# Patient Record
Sex: MALE | Race: WHITE | NOT HISPANIC OR LATINO | Employment: OTHER | URBAN - METROPOLITAN AREA
[De-identification: names, ages, dates, MRNs, and addresses within clinical notes are randomized per-mention and may not be internally consistent; named-entity substitution may affect disease eponyms.]

---

## 2022-08-02 ENCOUNTER — HOSPITAL ENCOUNTER (OUTPATIENT)
Dept: GENERAL RADIOLOGY | Facility: HOSPITAL | Age: 82
Discharge: HOME | End: 2022-08-02

## 2022-08-02 ENCOUNTER — PRE-ADMISSION TESTING (OUTPATIENT)
Dept: PREADMISSION TESTING | Facility: HOSPITAL | Age: 82
End: 2022-08-02

## 2022-08-02 VITALS
DIASTOLIC BLOOD PRESSURE: 65 MMHG | WEIGHT: 170.8 LBS | OXYGEN SATURATION: 94 % | RESPIRATION RATE: 18 BRPM | HEART RATE: 98 BPM | BODY MASS INDEX: 27.45 KG/M2 | HEIGHT: 66 IN | TEMPERATURE: 97.9 F | SYSTOLIC BLOOD PRESSURE: 122 MMHG

## 2022-08-02 LAB
ABO GROUP BLD: NORMAL
ALBUMIN SERPL-MCNC: 4.1 G/DL (ref 3.5–5.2)
ALBUMIN/GLOB SERPL: 1.9 G/DL
ALP SERPL-CCNC: 56 U/L (ref 39–117)
ALT SERPL W P-5'-P-CCNC: 24 U/L (ref 1–41)
ANION GAP SERPL CALCULATED.3IONS-SCNC: 8.6 MMOL/L (ref 5–15)
APTT PPP: 27.8 SECONDS (ref 22.7–35.4)
AST SERPL-CCNC: 22 U/L (ref 1–40)
BACTERIA UR QL AUTO: ABNORMAL /HPF
BASOPHILS # BLD AUTO: 0.01 10*3/MM3 (ref 0–0.2)
BASOPHILS NFR BLD AUTO: 0.2 % (ref 0–1.5)
BILIRUB SERPL-MCNC: 0.9 MG/DL (ref 0–1.2)
BILIRUB UR QL STRIP: NEGATIVE
BLD GP AB SCN SERPL QL: NEGATIVE
BUN SERPL-MCNC: 18 MG/DL (ref 8–23)
BUN/CREAT SERPL: 18.8 (ref 7–25)
CALCIUM SPEC-SCNC: 9.4 MG/DL (ref 8.6–10.5)
CHLORIDE SERPL-SCNC: 106 MMOL/L (ref 98–107)
CLARITY UR: CLEAR
CO2 SERPL-SCNC: 26.4 MMOL/L (ref 22–29)
COLOR UR: YELLOW
CREAT SERPL-MCNC: 0.96 MG/DL (ref 0.76–1.27)
DEPRECATED RDW RBC AUTO: 42.9 FL (ref 37–54)
EGFRCR SERPLBLD CKD-EPI 2021: 79.9 ML/MIN/1.73
EOSINOPHIL # BLD AUTO: 0 10*3/MM3 (ref 0–0.4)
EOSINOPHIL NFR BLD AUTO: 0 % (ref 0.3–6.2)
ERYTHROCYTE [DISTWIDTH] IN BLOOD BY AUTOMATED COUNT: 12.8 % (ref 12.3–15.4)
GLOBULIN UR ELPH-MCNC: 2.2 GM/DL
GLUCOSE SERPL-MCNC: 97 MG/DL (ref 65–99)
GLUCOSE UR STRIP-MCNC: NEGATIVE MG/DL
HCT VFR BLD AUTO: 46.9 % (ref 37.5–51)
HGB BLD-MCNC: 15.6 G/DL (ref 13–17.7)
HGB UR QL STRIP.AUTO: ABNORMAL
HYALINE CASTS UR QL AUTO: ABNORMAL /LPF
INR PPP: 1.06 (ref 0.9–1.1)
KETONES UR QL STRIP: NEGATIVE
LEUKOCYTE ESTERASE UR QL STRIP.AUTO: ABNORMAL
LYMPHOCYTES # BLD AUTO: 0.97 10*3/MM3 (ref 0.7–3.1)
LYMPHOCYTES NFR BLD AUTO: 16.6 % (ref 19.6–45.3)
MCH RBC QN AUTO: 31.1 PG (ref 26.6–33)
MCHC RBC AUTO-ENTMCNC: 33.3 G/DL (ref 31.5–35.7)
MCV RBC AUTO: 93.4 FL (ref 79–97)
MONOCYTES # BLD AUTO: 0.93 10*3/MM3 (ref 0.1–0.9)
MONOCYTES NFR BLD AUTO: 15.9 % (ref 5–12)
NEUTROPHILS NFR BLD AUTO: 3.91 10*3/MM3 (ref 1.7–7)
NEUTROPHILS NFR BLD AUTO: 67 % (ref 42.7–76)
NITRITE UR QL STRIP: NEGATIVE
PH UR STRIP.AUTO: 5.5 [PH] (ref 5–8)
PLATELET # BLD AUTO: 146 10*3/MM3 (ref 140–450)
PMV BLD AUTO: 9.8 FL (ref 6–12)
POTASSIUM SERPL-SCNC: 3.9 MMOL/L (ref 3.5–5.2)
PROT SERPL-MCNC: 6.3 G/DL (ref 6–8.5)
PROT UR QL STRIP: ABNORMAL
PROTHROMBIN TIME: 13.7 SECONDS (ref 11.7–14.2)
QT INTERVAL: 449 MS
RBC # BLD AUTO: 5.02 10*6/MM3 (ref 4.14–5.8)
RBC # UR STRIP: ABNORMAL /HPF
REF LAB TEST METHOD: ABNORMAL
RH BLD: NEGATIVE
SODIUM SERPL-SCNC: 141 MMOL/L (ref 136–145)
SP GR UR STRIP: 1.01 (ref 1–1.03)
SQUAMOUS #/AREA URNS HPF: ABNORMAL /HPF
T&S EXPIRATION DATE: NORMAL
UROBILINOGEN UR QL STRIP: ABNORMAL
WBC # UR STRIP: ABNORMAL /HPF
WBC NRBC COR # BLD: 5.84 10*3/MM3 (ref 3.4–10.8)

## 2022-08-02 PROCEDURE — 71046 X-RAY EXAM CHEST 2 VIEWS: CPT

## 2022-08-02 PROCEDURE — 93010 ELECTROCARDIOGRAM REPORT: CPT | Performed by: INTERNAL MEDICINE

## 2022-08-02 PROCEDURE — 86900 BLOOD TYPING SEROLOGIC ABO: CPT

## 2022-08-02 PROCEDURE — 86901 BLOOD TYPING SEROLOGIC RH(D): CPT

## 2022-08-02 PROCEDURE — 93005 ELECTROCARDIOGRAM TRACING: CPT

## 2022-08-02 PROCEDURE — 85730 THROMBOPLASTIN TIME PARTIAL: CPT

## 2022-08-02 PROCEDURE — 36415 COLL VENOUS BLD VENIPUNCTURE: CPT

## 2022-08-02 PROCEDURE — 86850 RBC ANTIBODY SCREEN: CPT

## 2022-08-02 PROCEDURE — 85025 COMPLETE CBC W/AUTO DIFF WBC: CPT

## 2022-08-02 PROCEDURE — 73030 X-RAY EXAM OF SHOULDER: CPT

## 2022-08-02 PROCEDURE — 81001 URINALYSIS AUTO W/SCOPE: CPT

## 2022-08-02 PROCEDURE — 85610 PROTHROMBIN TIME: CPT

## 2022-08-02 PROCEDURE — 80053 COMPREHEN METABOLIC PANEL: CPT

## 2022-08-02 RX ORDER — ALBUTEROL SULFATE 2.5 MG/3ML
2.5 SOLUTION RESPIRATORY (INHALATION) EVERY 4 HOURS PRN
COMMUNITY

## 2022-08-02 RX ORDER — BENRALIZUMAB 30 MG/ML
INJECTION, SOLUTION SUBCUTANEOUS
COMMUNITY

## 2022-08-02 RX ORDER — LISINOPRIL 2.5 MG/1
2.5 TABLET ORAL DAILY
COMMUNITY

## 2022-08-02 RX ORDER — ROSUVASTATIN CALCIUM 40 MG/1
40 TABLET, COATED ORAL NIGHTLY
COMMUNITY

## 2022-08-02 RX ORDER — BUDESONIDE 1 MG/2ML
1 INHALANT ORAL 2 TIMES DAILY
COMMUNITY

## 2022-08-02 RX ORDER — FUROSEMIDE 40 MG/1
40 TABLET ORAL DAILY
COMMUNITY

## 2022-08-02 RX ORDER — LEVOTHYROXINE SODIUM 0.12 MG/1
125 TABLET ORAL DAILY
COMMUNITY

## 2022-08-02 RX ORDER — ALBUTEROL SULFATE 90 UG/1
2 AEROSOL, METERED RESPIRATORY (INHALATION) EVERY 4 HOURS PRN
COMMUNITY

## 2022-08-02 RX ORDER — CHLORHEXIDINE GLUCONATE 500 MG/1
CLOTH TOPICAL
COMMUNITY

## 2022-08-02 NOTE — DISCHARGE INSTRUCTIONS
Take the following medications the morning of surgery:    METOPROLOL  LEVOTHYROXINE  INHALER  NEBULIZER TREATMENT    ARRIVAL TIME TO BE CALLED TO PATIENT(0515)      If you are on prescription narcotic pain medication to control your pain you may also take that medication the morning of surgery.    General Instructions:  Do not eat solid food after midnight the night before surgery.  You may drink clear liquids day of surgery but must stop at least one hour before your hospital arrival time.  It is beneficial for you to have a clear drink that contains carbohydrates the day of surgery.  We suggest a 12 to 20 ounce bottle of Gatorade or Powerade for non-diabetic patients or a 12 to 20 ounce bottle of G2 or Powerade Zero for diabetic patients. (Pediatric patients, are not advised to drink a 12 to 20 ounce carbohydrate drink)    Clear liquids are liquids you can see through.  Nothing red in color.     Plain water                               Sports drinks  Sodas                                   Gelatin (Jell-O)  Fruit juices without pulp such as white grape juice and apple juice  Popsicles that contain no fruit or yogurt  Tea or coffee (no cream or milk added)  Gatorade / Powerade  G2 / Powerade Zero    Infants may have breast milk up to four hours before surgery.  Infants drinking formula may drink formula up to six hours before surgery.   Patients who avoid smoking, chewing tobacco and alcohol for 4 weeks prior to surgery have a reduced risk of post-operative complications.  Quit smoking as many days before surgery as you can.  Do not smoke, use chewing tobacco or drink alcohol the day of surgery.   If applicable bring your C-PAP/ BI-PAP machine.  Bring any papers given to you in the doctor’s office.  Wear clean comfortable clothes.  Do not wear contact lenses, false eyelashes or make-up.  Bring a case for your glasses.   Bring crutches or walker if applicable.  Remove all piercings.  Leave jewelry and any other  valuables at home.  Hair extensions with metal clips must be removed prior to surgery.  The Pre-Admission Testing nurse will instruct you to bring medications if unable to obtain an accurate list in Pre-Admission Testing.        If you were given a blood bank ID arm band remember to bring it with you the day of surgery.    Preventing a Surgical Site Infection:  For 2 to 3 days before surgery, avoid shaving with a razor because the razor can irritate skin and make it easier to develop an infection.    Any areas of open skin can increase the risk of a post-operative wound infection by allowing bacteria to enter and travel throughout the body.  Notify your surgeon if you have any skin wounds / rashes even if it is not near the expected surgical site.  The area will need assessed to determine if surgery should be delayed until it is healed.  The night prior to surgery shower using a fresh bar of anti-bacterial soap (such as Dial) and clean washcloth.  Sleep in a clean bed with clean clothing.  Do not allow pets to sleep with you.  Shower on the morning of surgery using a fresh bar of anti-bacterial soap (such as Dial) and clean washcloth.  Dry with a clean towel and dress in clean clothing.  Ask your surgeon if you will be receiving antibiotics prior to surgery.  Make sure you, your family, and all healthcare providers clean their hands with soap and water or an alcohol based hand  before caring for you or your wound.    Day of surgery:  Your arrival time is approximately two hours before your scheduled surgery time.  Upon arrival, a Pre-op nurse and Anesthesiologist will review your health history, obtain vital signs, and answer questions you may have.  The only belongings needed at this time will be a list of your home medications and if applicable your C-PAP/BI-PAP machine.  A Pre-op nurse will start an IV and you may receive medication in preparation for surgery, including something to help you relax.      Please be aware that surgery does come with discomfort.  We want to make every effort to control your discomfort so please discuss any uncontrolled symptoms with your nurse.   Your doctor will most likely have prescribed pain medications.      If you are going home after surgery you will receive individualized written care instructions before being discharged.  A responsible adult must drive you to and from the hospital on the day of your surgery and stay with you for 24 hours.  Discharge prescriptions can be filled by the hospital pharmacy during regular pharmacy hours.  If you are having surgery late in the day/evening your prescription may be e-prescribed to your pharmacy.  Please verify your pharmacy hours or chose a 24 hour pharmacy to avoid not having access to your prescription because your pharmacy has closed for the day.    If you are staying overnight following surgery, you will be transported to your hospital room following the recovery period.  [facility] has all private rooms.    If you have any questions please call Pre-Admission Testing at [phone].  Deductibles and co-payments are collected on the day of service. Please be prepared to pay the required co-pay, deductible or deposit on the day of service as defined by your plan.    Patient Education for Self-Quarantine Process    Following your COVID testing, we strongly recommend that you wear a mask when you are with other people and practice social distancing.   Limit your activities to only required outings.  Wash your hands with soap and water frequently for at least 20 seconds.   Avoid touching your eyes, nose and mouth with unwashed hands.  Do not share anything - utensils, drinking glasses, food from the same bowl.   Sanitize household surfaces daily. Include all high touch areas (door handles, light switches, phones, countertops, etc.)    Call your surgeon immediately if you experience any of the following  symptoms:  Sore Throat  Shortness of Breath or difficulty breathing  Cough  Chills  Body soreness or muscle pain  Headache  Fever  New loss of taste or smell  Do not arrive for your surgery ill.  Your procedure will need to be rescheduled to another time.  You will need to call your physician before the day of surgery to avoid any unnecessary exposure to hospital staff as well as other patients.      CHLORHEXIDINE CLOTH INSTRUCTIONS  The morning of surgery follow these instructions using the Chlorhexidine cloths you've been given.  These steps reduce bacteria on the body.  Do not use the cloths near your eyes, ears mouth, genitalia or on open wounds.  Throw the cloths away after use but do not try to flush them down a toilet.      Open and remove one cloth at a time from the package.    Leave the cloth unfolded and begin the bathing.  Massage the skin with the cloths using gentle pressure to remove bacteria.  Do not scrub harshly.   Follow the steps below with one 2% CHG cloth per area (6 total cloths).  One cloth for neck, shoulders and chest.  One cloth for both arms, hands, fingers and underarms (do underarms last).  One cloth for the abdomen followed by groin.  One cloth for right leg and foot including between the toes.  One cloth for left leg and foot including between the toes.  The last cloth is to be used for the back of the neck, back and buttocks.    Allow the CHG to air dry 3 minutes on the skin which will give it time to work and decrease the chance of irritation.  The skin may feel sticky until it is dry.  Do not rinse with water or any other liquid or you will lose the beneficial effects of the CHG.  If mild skin irritation occurs, do rinse the skin to remove the CHG.  Report this to the nurse at time of admission.  Do not apply lotions, creams, ointments, deodorants or perfumes after using the clothes. Dress in clean clothes before coming to the hospital.    BACTROBAN NASAL OINTMENT  There are many  germs normally in your nose. Bactroban is an ointment that will help reduce these germs. Please follow these instructions for Bactroban use:      ____The day before surgery in the morning  Date________    ____The day before surgery in the evening              Date________    ____The day of surgery in the morning    Date________    **Squirt ½ package of Bactroban Ointment onto a cotton applicator and apply to inside of 1st nostril.  Squirt the remaining Bactroban and apply to the inside of the other nostril.

## 2022-08-09 ENCOUNTER — LAB (OUTPATIENT)
Dept: LAB | Facility: HOSPITAL | Age: 82
End: 2022-08-09

## 2022-08-09 LAB — SARS-COV-2 ORF1AB RESP QL NAA+PROBE: NOT DETECTED

## 2022-08-09 PROCEDURE — U0005 INFEC AGEN DETEC AMPLI PROBE: HCPCS

## 2022-08-09 PROCEDURE — C9803 HOPD COVID-19 SPEC COLLECT: HCPCS

## 2022-08-09 PROCEDURE — U0004 COV-19 TEST NON-CDC HGH THRU: HCPCS

## 2022-08-10 NOTE — H&P
HPI  Chief complaint right shoulder pain.81-year-old  male with a history of severe COPD on continuous oxygen previously presented because he had acute onset of right shoulder pain prompting a visit to the emergency room. X-rays were obtained and there were no fractures. He has a history of being treated in our office for rotator cuff arthropathy of the right shoulder and also has had a repair arthroscopically of his left rotator cuff in the past. The right shoulder however has been progressive and he has had a number of injections in the past and most recently a little over a month ago. He presents today as he has been seen by his pulmonologist who has given clearance to move forward with surgery. There are of course risk involved but they feel he can have the surgery.  ROS  ROS as noted in the HPI  Physical Exam  Right shoulder  He has a palpable effusion on the right shoulder. The skin is intact. He has smooth rotation below shoulder level but there is some subacromial crepitus when the shoulder is abducted slightly. He has decreased strength in external rotation and empty can position. Good deltoid contraction. On rotator testing he hikes the shoulder. Passive flexion is 150 degrees. Passive external rotation the arm at his side is about 35 to 40 degrees. He has normal distal sensation. Normal radial pulse and capillary refill    2 view x-ray right shoulder from [facility] ER 6/2/2022 reveal no fractures. There are degenerative changes in the glenohumeral joint. There is elevation of the humeral head relative to the glenoid. There is sclerosis on the underside of the acromion and at the greater tuberosity with decreased acromiohumeral distance consistent with rotator cuff arthropathy.  Assessment / Plan  Rotator cuff arthropathy right shoulder    1. Rotator cuff arthropathy of right shoulder  M25.811: Other specified joint disorders, right shoulder    Patient Instructions  We discussed the  findings. Now that he is cleared and he has had a CT scan with [employer] protocol we can move forward with scheduling. I recommend right reverse total shoulder arthroplasty. We discussed the benefits and risks of surgical intervention, as well as alternative treatments. Potential surgical risks and complications include but are not limited to deep venous thrombosis, infection, neurovascular injury, fracture, implant wear, implant failure, implant dislocation, possible need for revision surgery, loss of motion, limb length changes. Sufficient opportunity was given to discuss the condition and treatment plan and all questions were answered for the patient. The patient agreed to proceed with the surgical plan.

## 2022-08-11 ENCOUNTER — HOSPITAL ENCOUNTER (OUTPATIENT)
Facility: HOSPITAL | Age: 82
Discharge: HOME | End: 2022-08-12
Attending: ORTHOPAEDIC SURGERY | Admitting: ORTHOPAEDIC SURGERY

## 2022-08-11 ENCOUNTER — APPOINTMENT (OUTPATIENT)
Dept: GENERAL RADIOLOGY | Facility: HOSPITAL | Age: 82
End: 2022-08-11

## 2022-08-11 ENCOUNTER — ANESTHESIA EVENT (OUTPATIENT)
Dept: PERIOP | Facility: HOSPITAL | Age: 82
End: 2022-08-11

## 2022-08-11 ENCOUNTER — ANESTHESIA (OUTPATIENT)
Dept: PERIOP | Facility: HOSPITAL | Age: 82
End: 2022-08-11

## 2022-08-11 DIAGNOSIS — Z96.611 S/P REVERSE TOTAL SHOULDER ARTHROPLASTY, RIGHT: Primary | ICD-10-CM

## 2022-08-11 LAB
GLUCOSE BLDC GLUCOMTR-MCNC: 144 MG/DL (ref 70–130)
TROPONIN T SERPL-MCNC: <0.01 NG/ML (ref 0–0.03)

## 2022-08-11 PROCEDURE — 25010000002 DEXAMETHASONE PER 1 MG: Performed by: ANESTHESIOLOGY

## 2022-08-11 PROCEDURE — A9270 NON-COVERED ITEM OR SERVICE: HCPCS | Performed by: ORTHOPAEDIC SURGERY

## 2022-08-11 PROCEDURE — 63710000001 FUROSEMIDE 40 MG TABLET: Performed by: ORTHOPAEDIC SURGERY

## 2022-08-11 PROCEDURE — 25010000002 PROPOFOL 10 MG/ML EMULSION: Performed by: NURSE ANESTHETIST, CERTIFIED REGISTERED

## 2022-08-11 PROCEDURE — 93010 ELECTROCARDIOGRAM REPORT: CPT | Performed by: INTERNAL MEDICINE

## 2022-08-11 PROCEDURE — 0 BUPIVACAINE LIPOSOME 1.3 % SUSPENSION: Performed by: ANESTHESIOLOGY

## 2022-08-11 PROCEDURE — 94640 AIRWAY INHALATION TREATMENT: CPT

## 2022-08-11 PROCEDURE — G0378 HOSPITAL OBSERVATION PER HR: HCPCS

## 2022-08-11 PROCEDURE — C9290 INJ, BUPIVACAINE LIPOSOME: HCPCS | Performed by: ANESTHESIOLOGY

## 2022-08-11 PROCEDURE — 25010000002 ONDANSETRON PER 1 MG: Performed by: NURSE ANESTHETIST, CERTIFIED REGISTERED

## 2022-08-11 PROCEDURE — C1713 ANCHOR/SCREW BN/BN,TIS/BN: HCPCS | Performed by: ORTHOPAEDIC SURGERY

## 2022-08-11 PROCEDURE — 63710000001 ASPIRIN 81 MG CHEWABLE TABLET: Performed by: ORTHOPAEDIC SURGERY

## 2022-08-11 PROCEDURE — 94799 UNLISTED PULMONARY SVC/PX: CPT

## 2022-08-11 PROCEDURE — 25010000002 CEFAZOLIN PER 500 MG: Performed by: ORTHOPAEDIC SURGERY

## 2022-08-11 PROCEDURE — 25010000002 DEXAMETHASONE PER 1 MG: Performed by: NURSE ANESTHETIST, CERTIFIED REGISTERED

## 2022-08-11 PROCEDURE — 25010000002 CEFAZOLIN IN DEXTROSE 2-4 GM/100ML-% SOLUTION: Performed by: ORTHOPAEDIC SURGERY

## 2022-08-11 PROCEDURE — C1776 JOINT DEVICE (IMPLANTABLE): HCPCS | Performed by: ORTHOPAEDIC SURGERY

## 2022-08-11 PROCEDURE — 76942 ECHO GUIDE FOR BIOPSY: CPT | Performed by: ORTHOPAEDIC SURGERY

## 2022-08-11 PROCEDURE — 63710000001 OXYCODONE-ACETAMINOPHEN 7.5-325 MG TABLET: Performed by: ORTHOPAEDIC SURGERY

## 2022-08-11 PROCEDURE — 63710000001 ROSUVASTATIN 40 MG TABLET: Performed by: ORTHOPAEDIC SURGERY

## 2022-08-11 PROCEDURE — 93005 ELECTROCARDIOGRAM TRACING: CPT | Performed by: ORTHOPAEDIC SURGERY

## 2022-08-11 PROCEDURE — 82962 GLUCOSE BLOOD TEST: CPT

## 2022-08-11 PROCEDURE — 84484 ASSAY OF TROPONIN QUANT: CPT | Performed by: ORTHOPAEDIC SURGERY

## 2022-08-11 PROCEDURE — 73020 X-RAY EXAM OF SHOULDER: CPT

## 2022-08-11 PROCEDURE — 25010000002 FENTANYL CITRATE (PF) 50 MCG/ML SOLUTION: Performed by: ANESTHESIOLOGY

## 2022-08-11 PROCEDURE — 25010000002 FENTANYL CITRATE (PF) 50 MCG/ML SOLUTION: Performed by: NURSE ANESTHETIST, CERTIFIED REGISTERED

## 2022-08-11 DEVICE — IMPLANTABLE DEVICE
Type: IMPLANTABLE DEVICE | Site: SHOULDER | Status: FUNCTIONAL
Brand: EQUINOXE

## 2022-08-11 DEVICE — SCRW COMPR EQUINOXE LK 4.5X26MM: Type: IMPLANTABLE DEVICE | Site: SHOULDER | Status: FUNCTIONAL

## 2022-08-11 DEVICE — STEM HUM PRI EQUINOXE PRESSFIT 14MM SHT: Type: IMPLANTABLE DEVICE | Site: SHOULDER | Status: FUNCTIONAL

## 2022-08-11 DEVICE — SCRW COMPR EQUINOXE LK 4.5X30MM: Type: IMPLANTABLE DEVICE | Site: SHOULDER | Status: FUNCTIONAL

## 2022-08-11 DEVICE — SCRW COMPR EQUINOXE LK 4.5X38MM: Type: IMPLANTABLE DEVICE | Site: SHOULDER | Status: FUNCTIONAL

## 2022-08-11 DEVICE — TRY HUM EQUINOXE ADPT REV SHLDR PLS0: Type: IMPLANTABLE DEVICE | Site: SHOULDER | Status: FUNCTIONAL

## 2022-08-11 DEVICE — SCRW COMPR EQUINOXE LK 4.5X22MM: Type: IMPLANTABLE DEVICE | Site: SHOULDER | Status: FUNCTIONAL

## 2022-08-11 DEVICE — GLENOSPHERE SHLDR/REV EQUINOXE W/CP/LK/EXT 42MM: Type: IMPLANTABLE DEVICE | Site: SHOULDER | Status: FUNCTIONAL

## 2022-08-11 DEVICE — IMPLANTABLE DEVICE: Type: IMPLANTABLE DEVICE | Site: SHOULDER | Status: FUNCTIONAL

## 2022-08-11 RX ORDER — DEXAMETHASONE SODIUM PHOSPHATE 10 MG/ML
INJECTION INTRAMUSCULAR; INTRAVENOUS AS NEEDED
Status: DISCONTINUED | OUTPATIENT
Start: 2022-08-11 | End: 2022-08-11 | Stop reason: SURG

## 2022-08-11 RX ORDER — EPHEDRINE SULFATE 50 MG/ML
5 INJECTION, SOLUTION INTRAVENOUS ONCE AS NEEDED
Status: DISCONTINUED | OUTPATIENT
Start: 2022-08-11 | End: 2022-08-11 | Stop reason: HOSPADM

## 2022-08-11 RX ORDER — ONDANSETRON 2 MG/ML
4 INJECTION INTRAMUSCULAR; INTRAVENOUS EVERY 6 HOURS PRN
Status: DISCONTINUED | OUTPATIENT
Start: 2022-08-11 | End: 2022-08-12 | Stop reason: HOSPADM

## 2022-08-11 RX ORDER — LISINOPRIL 2.5 MG/1
2.5 TABLET ORAL DAILY
Status: DISCONTINUED | OUTPATIENT
Start: 2022-08-11 | End: 2022-08-12

## 2022-08-11 RX ORDER — MORPHINE SULFATE 4 MG/ML
6 INJECTION, SOLUTION INTRAMUSCULAR; INTRAVENOUS
Status: DISCONTINUED | OUTPATIENT
Start: 2022-08-11 | End: 2022-08-12 | Stop reason: HOSPADM

## 2022-08-11 RX ORDER — DIPHENHYDRAMINE HCL 25 MG
25 CAPSULE ORAL
Status: DISCONTINUED | OUTPATIENT
Start: 2022-08-11 | End: 2022-08-11 | Stop reason: HOSPADM

## 2022-08-11 RX ORDER — LABETALOL HYDROCHLORIDE 5 MG/ML
5 INJECTION, SOLUTION INTRAVENOUS
Status: DISCONTINUED | OUTPATIENT
Start: 2022-08-11 | End: 2022-08-11 | Stop reason: HOSPADM

## 2022-08-11 RX ORDER — IBUPROFEN 600 MG/1
600 TABLET ORAL ONCE AS NEEDED
Status: DISCONTINUED | OUTPATIENT
Start: 2022-08-11 | End: 2022-08-11 | Stop reason: HOSPADM

## 2022-08-11 RX ORDER — FUROSEMIDE 40 MG/1
40 TABLET ORAL DAILY
Status: DISCONTINUED | OUTPATIENT
Start: 2022-08-11 | End: 2022-08-12

## 2022-08-11 RX ORDER — BUPIVACAINE HYDROCHLORIDE 5 MG/ML
INJECTION, SOLUTION EPIDURAL; INTRACAUDAL
Status: COMPLETED | OUTPATIENT
Start: 2022-08-11 | End: 2022-08-11

## 2022-08-11 RX ORDER — SODIUM CHLORIDE 0.9 % (FLUSH) 0.9 %
10 SYRINGE (ML) INJECTION EVERY 12 HOURS SCHEDULED
Status: DISCONTINUED | OUTPATIENT
Start: 2022-08-11 | End: 2022-08-11 | Stop reason: HOSPADM

## 2022-08-11 RX ORDER — MORPHINE SULFATE 4 MG/ML
4 INJECTION, SOLUTION INTRAMUSCULAR; INTRAVENOUS
Status: DISCONTINUED | OUTPATIENT
Start: 2022-08-11 | End: 2022-08-12 | Stop reason: HOSPADM

## 2022-08-11 RX ORDER — SODIUM CHLORIDE 0.9 % (FLUSH) 0.9 %
10 SYRINGE (ML) INJECTION AS NEEDED
Status: DISCONTINUED | OUTPATIENT
Start: 2022-08-11 | End: 2022-08-11 | Stop reason: HOSPADM

## 2022-08-11 RX ORDER — CEFAZOLIN SODIUM 2 G/100ML
2 INJECTION, SOLUTION INTRAVENOUS ONCE
Status: COMPLETED | OUTPATIENT
Start: 2022-08-11 | End: 2022-08-11

## 2022-08-11 RX ORDER — ONDANSETRON 2 MG/ML
4 INJECTION INTRAMUSCULAR; INTRAVENOUS ONCE AS NEEDED
Status: DISCONTINUED | OUTPATIENT
Start: 2022-08-11 | End: 2022-08-11 | Stop reason: HOSPADM

## 2022-08-11 RX ORDER — LEVOTHYROXINE SODIUM 0.12 MG/1
125 TABLET ORAL DAILY
Status: DISCONTINUED | OUTPATIENT
Start: 2022-08-11 | End: 2022-08-12 | Stop reason: HOSPADM

## 2022-08-11 RX ORDER — HYDROCODONE BITARTRATE AND ACETAMINOPHEN 7.5; 325 MG/1; MG/1
1 TABLET ORAL ONCE AS NEEDED
Status: DISCONTINUED | OUTPATIENT
Start: 2022-08-11 | End: 2022-08-11 | Stop reason: HOSPADM

## 2022-08-11 RX ORDER — PROMETHAZINE HYDROCHLORIDE 25 MG/1
25 SUPPOSITORY RECTAL ONCE AS NEEDED
Status: DISCONTINUED | OUTPATIENT
Start: 2022-08-11 | End: 2022-08-11 | Stop reason: HOSPADM

## 2022-08-11 RX ORDER — ACETAMINOPHEN 650 MG/1
650 SUPPOSITORY RECTAL ONCE AS NEEDED
Status: DISCONTINUED | OUTPATIENT
Start: 2022-08-11 | End: 2022-08-11 | Stop reason: HOSPADM

## 2022-08-11 RX ORDER — EPHEDRINE SULFATE 50 MG/ML
INJECTION, SOLUTION INTRAVENOUS AS NEEDED
Status: DISCONTINUED | OUTPATIENT
Start: 2022-08-11 | End: 2022-08-11 | Stop reason: SURG

## 2022-08-11 RX ORDER — NALOXONE HCL 0.4 MG/ML
0.2 VIAL (ML) INJECTION AS NEEDED
Status: DISCONTINUED | OUTPATIENT
Start: 2022-08-11 | End: 2022-08-11 | Stop reason: HOSPADM

## 2022-08-11 RX ORDER — HYDRALAZINE HYDROCHLORIDE 20 MG/ML
5 INJECTION INTRAMUSCULAR; INTRAVENOUS
Status: DISCONTINUED | OUTPATIENT
Start: 2022-08-11 | End: 2022-08-11 | Stop reason: HOSPADM

## 2022-08-11 RX ORDER — LIDOCAINE HYDROCHLORIDE 20 MG/ML
INJECTION, SOLUTION INFILTRATION; PERINEURAL AS NEEDED
Status: DISCONTINUED | OUTPATIENT
Start: 2022-08-11 | End: 2022-08-11 | Stop reason: SURG

## 2022-08-11 RX ORDER — LIDOCAINE HYDROCHLORIDE 10 MG/ML
0.5 INJECTION, SOLUTION EPIDURAL; INFILTRATION; INTRACAUDAL; PERINEURAL ONCE AS NEEDED
Status: DISCONTINUED | OUTPATIENT
Start: 2022-08-11 | End: 2022-08-11 | Stop reason: HOSPADM

## 2022-08-11 RX ORDER — OXYCODONE AND ACETAMINOPHEN 7.5; 325 MG/1; MG/1
1 TABLET ORAL EVERY 4 HOURS PRN
Status: DISCONTINUED | OUTPATIENT
Start: 2022-08-11 | End: 2022-08-12 | Stop reason: HOSPADM

## 2022-08-11 RX ORDER — FENTANYL CITRATE 50 UG/ML
50 INJECTION, SOLUTION INTRAMUSCULAR; INTRAVENOUS
Status: DISCONTINUED | OUTPATIENT
Start: 2022-08-11 | End: 2022-08-11 | Stop reason: HOSPADM

## 2022-08-11 RX ORDER — NALOXONE HCL 0.4 MG/ML
0.4 VIAL (ML) INJECTION
Status: DISCONTINUED | OUTPATIENT
Start: 2022-08-11 | End: 2022-08-12 | Stop reason: HOSPADM

## 2022-08-11 RX ORDER — ROCURONIUM BROMIDE 10 MG/ML
INJECTION, SOLUTION INTRAVENOUS AS NEEDED
Status: DISCONTINUED | OUTPATIENT
Start: 2022-08-11 | End: 2022-08-11 | Stop reason: SURG

## 2022-08-11 RX ORDER — ASPIRIN 81 MG/1
81 TABLET, CHEWABLE ORAL DAILY
Status: DISCONTINUED | OUTPATIENT
Start: 2022-08-11 | End: 2022-08-12 | Stop reason: HOSPADM

## 2022-08-11 RX ORDER — PROMETHAZINE HYDROCHLORIDE 25 MG/1
25 TABLET ORAL ONCE AS NEEDED
Status: DISCONTINUED | OUTPATIENT
Start: 2022-08-11 | End: 2022-08-11 | Stop reason: HOSPADM

## 2022-08-11 RX ORDER — ONDANSETRON 2 MG/ML
INJECTION INTRAMUSCULAR; INTRAVENOUS AS NEEDED
Status: DISCONTINUED | OUTPATIENT
Start: 2022-08-11 | End: 2022-08-11 | Stop reason: SURG

## 2022-08-11 RX ORDER — ACETAMINOPHEN 325 MG/1
650 TABLET ORAL ONCE AS NEEDED
Status: DISCONTINUED | OUTPATIENT
Start: 2022-08-11 | End: 2022-08-11 | Stop reason: HOSPADM

## 2022-08-11 RX ORDER — PROPOFOL 10 MG/ML
VIAL (ML) INTRAVENOUS AS NEEDED
Status: DISCONTINUED | OUTPATIENT
Start: 2022-08-11 | End: 2022-08-11 | Stop reason: SURG

## 2022-08-11 RX ORDER — ONDANSETRON 4 MG/1
4 TABLET, FILM COATED ORAL EVERY 6 HOURS PRN
Status: DISCONTINUED | OUTPATIENT
Start: 2022-08-11 | End: 2022-08-12 | Stop reason: HOSPADM

## 2022-08-11 RX ORDER — ALBUTEROL SULFATE 2.5 MG/3ML
2.5 SOLUTION RESPIRATORY (INHALATION) EVERY 4 HOURS PRN
Status: DISCONTINUED | OUTPATIENT
Start: 2022-08-11 | End: 2022-08-12 | Stop reason: HOSPADM

## 2022-08-11 RX ORDER — MAGNESIUM HYDROXIDE 1200 MG/15ML
LIQUID ORAL AS NEEDED
Status: DISCONTINUED | OUTPATIENT
Start: 2022-08-11 | End: 2022-08-11 | Stop reason: HOSPADM

## 2022-08-11 RX ORDER — BUDESONIDE 1 MG/2ML
1 INHALANT ORAL
Status: DISCONTINUED | OUTPATIENT
Start: 2022-08-11 | End: 2022-08-12 | Stop reason: HOSPADM

## 2022-08-11 RX ORDER — DIPHENHYDRAMINE HYDROCHLORIDE 50 MG/ML
12.5 INJECTION INTRAMUSCULAR; INTRAVENOUS
Status: DISCONTINUED | OUTPATIENT
Start: 2022-08-11 | End: 2022-08-11 | Stop reason: HOSPADM

## 2022-08-11 RX ORDER — CEFAZOLIN SODIUM 2 G/100ML
2 INJECTION, SOLUTION INTRAVENOUS EVERY 8 HOURS
Status: COMPLETED | OUTPATIENT
Start: 2022-08-11 | End: 2022-08-12

## 2022-08-11 RX ORDER — SODIUM CHLORIDE, SODIUM LACTATE, POTASSIUM CHLORIDE, CALCIUM CHLORIDE 600; 310; 30; 20 MG/100ML; MG/100ML; MG/100ML; MG/100ML
9 INJECTION, SOLUTION INTRAVENOUS CONTINUOUS PRN
Status: DISCONTINUED | OUTPATIENT
Start: 2022-08-11 | End: 2022-08-11 | Stop reason: HOSPADM

## 2022-08-11 RX ORDER — SODIUM CHLORIDE 450 MG/100ML
100 INJECTION, SOLUTION INTRAVENOUS CONTINUOUS
Status: DISCONTINUED | OUTPATIENT
Start: 2022-08-11 | End: 2022-08-12 | Stop reason: HOSPADM

## 2022-08-11 RX ORDER — ALBUTEROL SULFATE 90 UG/1
2 AEROSOL, METERED RESPIRATORY (INHALATION) EVERY 4 HOURS PRN
Status: DISCONTINUED | OUTPATIENT
Start: 2022-08-11 | End: 2022-08-11 | Stop reason: SDUPTHER

## 2022-08-11 RX ORDER — ROSUVASTATIN CALCIUM 40 MG/1
40 TABLET, COATED ORAL NIGHTLY
Status: DISCONTINUED | OUTPATIENT
Start: 2022-08-11 | End: 2022-08-12 | Stop reason: HOSPADM

## 2022-08-11 RX ORDER — HYDROMORPHONE HYDROCHLORIDE 1 MG/ML
0.5 INJECTION, SOLUTION INTRAMUSCULAR; INTRAVENOUS; SUBCUTANEOUS
Status: DISCONTINUED | OUTPATIENT
Start: 2022-08-11 | End: 2022-08-11 | Stop reason: HOSPADM

## 2022-08-11 RX ORDER — DEXAMETHASONE SODIUM PHOSPHATE 4 MG/ML
INJECTION, SOLUTION INTRA-ARTICULAR; INTRALESIONAL; INTRAMUSCULAR; INTRAVENOUS; SOFT TISSUE
Status: COMPLETED | OUTPATIENT
Start: 2022-08-11 | End: 2022-08-11

## 2022-08-11 RX ORDER — FENTANYL CITRATE 50 UG/ML
50 INJECTION, SOLUTION INTRAMUSCULAR; INTRAVENOUS ONCE
Status: COMPLETED | OUTPATIENT
Start: 2022-08-11 | End: 2022-08-11

## 2022-08-11 RX ORDER — FLUMAZENIL 0.1 MG/ML
0.2 INJECTION INTRAVENOUS AS NEEDED
Status: DISCONTINUED | OUTPATIENT
Start: 2022-08-11 | End: 2022-08-11 | Stop reason: HOSPADM

## 2022-08-11 RX ORDER — DIAZEPAM 5 MG/1
5 TABLET ORAL EVERY 6 HOURS PRN
Status: DISCONTINUED | OUTPATIENT
Start: 2022-08-11 | End: 2022-08-12 | Stop reason: HOSPADM

## 2022-08-11 RX ADMIN — BUPIVACAINE HYDROCHLORIDE 10 ML: 5 INJECTION, SOLUTION EPIDURAL; INTRACAUDAL; PERINEURAL at 06:45

## 2022-08-11 RX ADMIN — SUGAMMADEX 160 MG: 100 INJECTION, SOLUTION INTRAVENOUS at 09:20

## 2022-08-11 RX ADMIN — DEXAMETHASONE SODIUM PHOSPHATE 4 MG: 4 INJECTION, SOLUTION INTRAMUSCULAR; INTRAVENOUS at 06:45

## 2022-08-11 RX ADMIN — SODIUM CHLORIDE, POTASSIUM CHLORIDE, SODIUM LACTATE AND CALCIUM CHLORIDE: 600; 310; 30; 20 INJECTION, SOLUTION INTRAVENOUS at 08:46

## 2022-08-11 RX ADMIN — FENTANYL CITRATE 50 MCG: 50 INJECTION INTRAMUSCULAR; INTRAVENOUS at 06:40

## 2022-08-11 RX ADMIN — ROCURONIUM BROMIDE 50 MG: 50 INJECTION INTRAVENOUS at 07:18

## 2022-08-11 RX ADMIN — EPHEDRINE SULFATE 10 MG: 50 INJECTION INTRAVENOUS at 09:02

## 2022-08-11 RX ADMIN — SODIUM CHLORIDE 75 ML/HR: 4.5 INJECTION, SOLUTION INTRAVENOUS at 12:15

## 2022-08-11 RX ADMIN — CEFAZOLIN SODIUM 2 G: 2 INJECTION, SOLUTION INTRAVENOUS at 23:53

## 2022-08-11 RX ADMIN — DEXAMETHASONE SODIUM PHOSPHATE 6 MG: 10 INJECTION INTRAMUSCULAR; INTRAVENOUS at 07:33

## 2022-08-11 RX ADMIN — EPHEDRINE SULFATE 10 MG: 50 INJECTION INTRAVENOUS at 08:38

## 2022-08-11 RX ADMIN — FENTANYL CITRATE 50 MCG: 50 INJECTION INTRAMUSCULAR; INTRAVENOUS at 09:44

## 2022-08-11 RX ADMIN — FENTANYL CITRATE 50 MCG: 50 INJECTION INTRAMUSCULAR; INTRAVENOUS at 09:55

## 2022-08-11 RX ADMIN — EPHEDRINE SULFATE 10 MG: 50 INJECTION INTRAVENOUS at 08:13

## 2022-08-11 RX ADMIN — EPHEDRINE SULFATE 10 MG: 50 INJECTION INTRAVENOUS at 07:38

## 2022-08-11 RX ADMIN — ASPIRIN 81 MG: 81 TABLET, CHEWABLE ORAL at 12:16

## 2022-08-11 RX ADMIN — ROSUVASTATIN CALCIUM 40 MG: 40 TABLET, FILM COATED ORAL at 23:53

## 2022-08-11 RX ADMIN — PROPOFOL 150 MG: 10 INJECTION, EMULSION INTRAVENOUS at 07:18

## 2022-08-11 RX ADMIN — EPHEDRINE SULFATE 10 MG: 50 INJECTION INTRAVENOUS at 07:53

## 2022-08-11 RX ADMIN — CEFAZOLIN SODIUM 2 G: 2 INJECTION, SOLUTION INTRAVENOUS at 07:00

## 2022-08-11 RX ADMIN — BUPIVACAINE 10 ML: 13.3 INJECTION, SUSPENSION, LIPOSOMAL INFILTRATION at 06:45

## 2022-08-11 RX ADMIN — CEFAZOLIN SODIUM 2 G: 2 INJECTION, SOLUTION INTRAVENOUS at 15:10

## 2022-08-11 RX ADMIN — LIDOCAINE HYDROCHLORIDE 80 MG: 20 INJECTION, SOLUTION INFILTRATION; PERINEURAL at 07:18

## 2022-08-11 RX ADMIN — IPRATROPIUM BROMIDE 0.5 MG: 0.5 SOLUTION RESPIRATORY (INHALATION) at 22:44

## 2022-08-11 RX ADMIN — FUROSEMIDE 40 MG: 40 TABLET ORAL at 12:16

## 2022-08-11 RX ADMIN — ONDANSETRON 4 MG: 2 INJECTION INTRAMUSCULAR; INTRAVENOUS at 07:55

## 2022-08-11 RX ADMIN — OXYCODONE HYDROCHLORIDE AND ACETAMINOPHEN 1 TABLET: 7.5; 325 TABLET ORAL at 13:12

## 2022-08-11 NOTE — ANESTHESIA POSTPROCEDURE EVALUATION
Patient: Vijay Foote    Procedure Summary     Date: 08/11/22 Room / Location: [facility] OR [address] [place] [address] [facility]    Anesthesia Start: 0709 Anesthesia Stop: 0932    Procedure: RIGHT REVERSE TOTAL SHOULDER ARTHROPLASTY (Right Shoulder) Diagnosis:     Surgeons: Wali Orellana MD Provider: Jourdan Santoro MD    Anesthesia Type: general with block ASA Status: 3          Anesthesia Type: general with block    Vitals  Vitals Value Taken Time   BP 91/56 08/11/22 1030   Temp 36.4 °C (97.6 °F) 08/11/22 1015   Pulse 74 08/11/22 1032   Resp 19 08/11/22 1030   SpO2 94 % 08/11/22 1032   Vitals shown include unvalidated device data.        Post Anesthesia Care and Evaluation    Patient participation: complete - patient participated  Level of consciousness: awake and alert  Pain management: adequate    Airway patency: patent  Anesthetic complications: No anesthetic complications  PONV Status: controlled  Cardiovascular status: acceptable  Respiratory status: acceptable  Hydration status: acceptable    Comments: BP 91/56 (BP Location: Left arm, Patient Position: Lying)   Pulse 75   Temp 36.4 °C (97.6 °F) (Oral)   Resp 19   SpO2 98%

## 2022-08-11 NOTE — ANESTHESIA PROCEDURE NOTES
Airway  Urgency: elective    Date/Time: 8/11/2022 7:26 AM  Difficult airway    General Information and Staff    Patient location during procedure: OR  Anesthesiologist: Jourdan Santoro MD  CRNA/CAA: Mansi López CRNA    Indications and Patient Condition  Indications for airway management: airway protection    Preoxygenated: yes (pt pre-O2 with 100% O2)  Mask difficulty assessment: 2 - vent by mask + OA or adjuvant +/- NMBA (easy BMV )    Final Airway Details  Final airway type: endotracheal airway      Successful airway: ETT  Cuffed: yes   Successful intubation technique: video laryngoscopy  Facilitating devices/methods: intubating stylet and anterior pressure/BURP  Endotracheal tube insertion site: oral  Blade: CMAC  Blade size: D  ETT size (mm): 7.5  Cormack-Lehane Classification: grade IIb - view of arytenoids or posterior of glottis only  Placement verified by: chest auscultation and capnometry   Cuff volume (mL): 7  Measured from: lips  ETT/EBT  to lips (cm): 23  Number of attempts at approach: 2  Assessment: atraumatic intubation    Additional Comments  Pt has grade I view with CMAC D blade. Pt had grade IIb with Mac 4 blade. Pt has small mouth opening, minimal neck ROM and anterior view.  No change in dentition.

## 2022-08-11 NOTE — PLAN OF CARE
Goal Outcome Evaluation:  Plan of Care Reviewed With: patient        Progress: improving  Outcome Evaluation: pod 0. vss. 3L 02. ambulates with stand by assist. low bp, held bp meds this shift. voiding per brp. pain tolerated with po prn meds. plan to dc home tomorrow if appropriate. educated on bp monitoring.

## 2022-08-11 NOTE — ANESTHESIA PROCEDURE NOTES
Peripheral Block      Patient location during procedure: pre-op  Reason for block: procedure for pain, at surgeon's request and post-op pain management  Performed by  Anesthesiologist: Chino Ho MD  Preanesthetic Checklist  Completed: patient identified, IV checked, site marked, risks and benefits discussed, surgical consent, monitors and equipment checked, pre-op evaluation and timeout performed  Prep:  Pt Position: supine  Sterile barriers:washed/disinfected hands, partial drape, mask, gown and gloves  Prep: ChloraPrep  Patient monitoring: blood pressure monitoring, continuous pulse oximetry and EKG  Procedure    Sedation: yes  Performed under: local infiltration  Guidance:ultrasound guided    ULTRASOUND INTERPRETATION.  Using ultrasound guidance a 20 G gauge needle was placed in close proximity to the brachial plexus nerve, at which point, under ultrasound guidance anesthetic was injected in the area of the nerve and spread of the anesthesia was seen on ultrasound in close proximity thereto.  There were no abnormalities seen on ultrasound; a digital image was taken; and the patient tolerated the procedure with no complications.   Laterality:right  Block Type:interscalene  Injection Technique:single-shot  Needle Type:echogenic  Resistance on Injection: none    Medications Used: bupivacaine liposome (EXPAREL) 1.3 % injection, 10 mL  dexamethasone (DECADRON) injection, 4 mg  bupivacaine (PF) (MARCAINE) 0.5 % injection, 10 mL  Med administered at 8/11/2022 6:45 AM      Post Assessment  Injection Assessment: negative aspiration for heme, no paresthesia on injection and incremental injection  Patient Tolerance:comfortable throughout block  Complications:no

## 2022-08-11 NOTE — ANESTHESIA PREPROCEDURE EVALUATION
Anesthesia Evaluation     NPO Solid Status: > 8 hours  NPO Liquid Status: > 8 hours           Airway   Mallampati: II  TM distance: >3 FB  No difficulty expected  Dental      Pulmonary    (+) COPD, asthma,  Cardiovascular     (+) hypertension, CAD, hyperlipidemia,       Neuro/Psych  GI/Hepatic/Renal/Endo    (+)   thyroid problem     Musculoskeletal     Abdominal    Substance History      OB/GYN          Other                        Anesthesia Plan    ASA 3     general with block     intravenous induction     Anesthetic plan, risks, benefits, and alternatives have been provided, discussed and informed consent has been obtained with: patient.    Plan discussed with CRNA.        CODE STATUS:

## 2022-08-11 NOTE — OP NOTE
Date: 8/11/2022  Time: 13:39 EDT TOTAL SHOULDER REVERSE ARTHROPLASTY  Procedure Note    Vijay Foote  8/11/2022    Pre-op Diagnosis:    Rotator cuff arthropathy right shoulder    Post-op Diagnosis   Rotator cuff arthropathy right shoulder    Procedure:  Right reverse total shoulder arthroplasty with intraoperative computer navigation    Surgeon: Wali Orellana M.D.    Surgical assistant: Bria Quinn CSA      Anesthesia: General with Block  Anesthesiologist: Jourdan Santoro MD  CRNA: Mansi López CRNA    Staff:   Circulator: Anuradha Jimenez RN; Radha Dover RN  Scrub Person: Bria Quinn; Rachel Boswell  Vendor Representative: Rad Phillip    Indication for Asst.  A surgical assistant, Bria Quinn CSA , was utilized as a medical necessity and was present through the entire procedure allowing safe completion of the procedure as well as reducing overall operative time and morbidity for the patient.    IV antibiotic: Cefazolin    Estimated Blood Loss: 300 ml    Specimens: * No orders in the log *    Complications: None    Implants: [employer] reverse total shoulder system     Implant specifics: 14 mm Preserve press-fit stem, +0 humeral tray, +0 humeral liner 42 mm, posterior superior augmented baseplate with 4 compression screws and locking caps, 42 mm glenosphere with central screw.      Procedure: The patient was taken to the operative suite.  After adequate anesthesia was established the upper extremity was prepped and draped in the usual fashion.  The head was placed in a neutral position and bony prominences were padded.  Ioban was utilized covering the skin over the shoulder and axilla.  An anterior incision was made starting lateral to the coracoid towards the axillary crease through skin and subcutaneous tissues.  The deltopectoral interval was entered.  The cephalic vein was preserved.  The conjoined tendon was reflected medially.  The biceps tendon was identified  and tenodesed to the pectoralis.  The tendon was then tenotomized more proximally.  The subscapularis was divided and tagged.  Arthropathic changes were noted in the glenohumeral joint.  The rotator cuff was in poor condition with tearing noted.  The bone was extremely eburnated and rough to palpation along the anterior aspect of the humeral head over the central portion which showed severe degenerative changes.  The rotator cuff was completely torn and retracted superiorly.  The capsule was released off the humeral neck and the posterior soft tissue attachments were protected. An external cutting guide was utilized in the head was cut at a 20° retroverted angle.  Excess osteophytes were excised. Appropriate impaction broaching was carried out to a 12 mm broach knowing I would likely upsized to a 14 because of his size..  The final broach was left in place for later trialing.   I then visualized the glenoid and retractors were placed.  The capsule was reflected off the deep surface of the subscapularis.  The capsule was also released off the humeral neck and off the inferior glenoid protecting the axillary nerve and transversing blood vessels throughout.  Soft tissues were dissected off the superior glenoid including the biceps stump.  This allowed for visualization of the glenoid.  The coracoid was skeletonized with the Bovie exposing the anterior portion and the neck of the coracoid.  A tracker was fixed to the coracoid with 2 small bicortical screws with firm fixation.  Acquisitions were then obtained from the bony surface of the coracoid and glenoid to transfer data to the computer to allow for navigation.  Once this was complete I was able to use computer guidance for placement of the glenoid.  The preoperative plan called for a posterior superior augmented glenoid baseplate due to severe posterior and superior erosion.  Using the navigational tools I attempted to drill a central hole for later reaming but  could never get it lined up exactly the way I wanted using the navigation system.  We made several attempts to correct but because it was wanting me to start the drill hole lower on the glenoid then I wanted, I ended up reverting back to the manual instrumentation but utilized in the preoperative plan for guidance.  I placed a guide and drilled a 0 degree guide pin followed by the guidepin for the reaming for a posterior superior augmented baseplate.  Once this was placed I reamed to the appropriate depth until the glenoid was smooth.  I then replaced the guidepin and put back in the 0 guidepin which was then utilized for central hole drilling.  Once this was complete I chose a posterior superior glenoid baseplate and compressed this into position after bone graft had been placed in the cage from the humeral head.  Once it was fully seated I held this with 4 compression screws and locking caps.  A 42 glenosphere was placed according to the plan and set with a central compression screw.  Attention was then turned to the humeral side.  I then removed the tracker from the coracoid by removing both bicortical screws.  I then trialed the humeral tray and polyethylene.  Once satisfied with range of motion and stability I removed the humeral components.  I pulse lavaged the proximal humerus and reamed broached the proximal humerus up to a 14 Preserve stem and then press-fit the appropriate 14 mm size stem with good purchase and fixation.  Next I re-trialed the humeral tray and humeral liner.  When I was satisfied with range of motion and stability I removed the trial liner and tray.  The appropriate humeral tray was in place and held with a torque limiting screw.  The polyethylene was then inserted and compressed into place and the shoulder was reduced.  Subdeltoid scar tissue was excised area  Good range of motion was noted.  Good stability was noted.  Vigorous irrigation and suctioning was performed. The deltopectoral  interval was closed with 0 Vicryl.  The subcutaneous tissues were closed with 2-0 Vicryl.  The skin was closed with a zip line device.  The patient had a sterile compression dressing applied and was awoken and taken to the postanesthetic recovery unit in good condition.    Wali Orellana MD     Date: 8/11/2022  Time: 13:39 EDT

## 2022-08-12 VITALS
WEIGHT: 170 LBS | HEIGHT: 66 IN | RESPIRATION RATE: 16 BRPM | DIASTOLIC BLOOD PRESSURE: 54 MMHG | SYSTOLIC BLOOD PRESSURE: 90 MMHG | BODY MASS INDEX: 27.32 KG/M2 | TEMPERATURE: 97 F | HEART RATE: 82 BPM | OXYGEN SATURATION: 95 %

## 2022-08-12 PROBLEM — I95.81 POSTOPERATIVE HYPOTENSION: Status: ACTIVE | Noted: 2022-08-12

## 2022-08-12 LAB
ANION GAP SERPL CALCULATED.3IONS-SCNC: 9.7 MMOL/L (ref 5–15)
BUN SERPL-MCNC: 11 MG/DL (ref 8–23)
BUN/CREAT SERPL: 13.3 (ref 7–25)
CALCIUM SPEC-SCNC: 8.9 MG/DL (ref 8.6–10.5)
CHLORIDE SERPL-SCNC: 105 MMOL/L (ref 98–107)
CO2 SERPL-SCNC: 23.3 MMOL/L (ref 22–29)
CREAT SERPL-MCNC: 0.83 MG/DL (ref 0.76–1.27)
EGFRCR SERPLBLD CKD-EPI 2021: 88.5 ML/MIN/1.73
GLUCOSE SERPL-MCNC: 119 MG/DL (ref 65–99)
HCT VFR BLD AUTO: 40 % (ref 37.5–51)
HGB BLD-MCNC: 12.7 G/DL (ref 13–17.7)
POTASSIUM SERPL-SCNC: 4.4 MMOL/L (ref 3.5–5.2)
QT INTERVAL: 419 MS
SODIUM SERPL-SCNC: 138 MMOL/L (ref 136–145)

## 2022-08-12 PROCEDURE — 85014 HEMATOCRIT: CPT | Performed by: ORTHOPAEDIC SURGERY

## 2022-08-12 PROCEDURE — G0378 HOSPITAL OBSERVATION PER HR: HCPCS

## 2022-08-12 PROCEDURE — 94664 DEMO&/EVAL PT USE INHALER: CPT

## 2022-08-12 PROCEDURE — 63710000001 ASPIRIN 81 MG CHEWABLE TABLET: Performed by: ORTHOPAEDIC SURGERY

## 2022-08-12 PROCEDURE — A9270 NON-COVERED ITEM OR SERVICE: HCPCS | Performed by: ORTHOPAEDIC SURGERY

## 2022-08-12 PROCEDURE — 97165 OT EVAL LOW COMPLEX 30 MIN: CPT

## 2022-08-12 PROCEDURE — 94799 UNLISTED PULMONARY SVC/PX: CPT

## 2022-08-12 PROCEDURE — 94761 N-INVAS EAR/PLS OXIMETRY MLT: CPT

## 2022-08-12 PROCEDURE — 97530 THERAPEUTIC ACTIVITIES: CPT

## 2022-08-12 PROCEDURE — 85018 HEMOGLOBIN: CPT | Performed by: ORTHOPAEDIC SURGERY

## 2022-08-12 PROCEDURE — 63710000001 LEVOTHYROXINE 125 MCG TABLET: Performed by: ORTHOPAEDIC SURGERY

## 2022-08-12 PROCEDURE — 80048 BASIC METABOLIC PNL TOTAL CA: CPT | Performed by: ORTHOPAEDIC SURGERY

## 2022-08-12 RX ORDER — ASPIRIN 81 MG/1
81 TABLET, CHEWABLE ORAL DAILY
Qty: 21 TABLET | Refills: 0 | Status: SHIPPED | OUTPATIENT
Start: 2022-08-12

## 2022-08-12 RX ORDER — OXYCODONE AND ACETAMINOPHEN 7.5; 325 MG/1; MG/1
1 TABLET ORAL EVERY 4 HOURS PRN
Qty: 40 TABLET | Refills: 0 | Status: SHIPPED | OUTPATIENT
Start: 2022-08-12 | End: 2022-08-18

## 2022-08-12 RX ADMIN — LEVOTHYROXINE SODIUM 125 MCG: 0.12 TABLET ORAL at 08:21

## 2022-08-12 RX ADMIN — IPRATROPIUM BROMIDE 0.5 MG: 0.5 SOLUTION RESPIRATORY (INHALATION) at 12:08

## 2022-08-12 RX ADMIN — ASPIRIN 81 MG: 81 TABLET, CHEWABLE ORAL at 08:21

## 2022-08-12 RX ADMIN — SODIUM CHLORIDE 500 ML: 9 INJECTION, SOLUTION INTRAVENOUS at 10:39

## 2022-08-12 RX ADMIN — IPRATROPIUM BROMIDE 0.5 MG: 0.5 SOLUTION RESPIRATORY (INHALATION) at 06:37

## 2022-08-12 RX ADMIN — BUDESONIDE 1 MG: 1 INHALANT ORAL at 06:37

## 2022-08-12 NOTE — PLAN OF CARE
Goal Outcome Evaluation:  Plan of Care Reviewed With: patient, spouse           Outcome Evaluation: Pt is a 82 y.o. M POD 1 from R reverse TSA; per nsg notes, rapid response was called on pt last night due to epigastric pain, however pain resolved and has had no other issues since then. Prior to admit, pt lives with spouse, wears O2, was independent with ADLs, and uses no AD or DME. Pt today agreeable to OT eval and was explained shoulder precatuions and NWB status. Pt and spouse educated on HEP, but pt did not complete this date as R block was still intact. Pt and spouse additionally educated on and practiced UBD with fabricio technique and LBD while maintaining shoulder precautions. During session, pt completed x2 STS with SBA. OT recommends d/c home with assist and OP OT.    OT wore appropriate PPE and completed hand hygiene.

## 2022-08-12 NOTE — NURSING NOTE
Patient with low blood pressures overnight, and rapid called. Discharge orders this date noted. This RN called the on-call provider to notify them of blood pressures. YEISON Green, returned call with orders for a 500cc bolus, and consult to A. This RN relayed this information to patient and wife who both adamantly refused, stating they did not find those interventions necessary. Spoke with Norma again, who stated patient would have to leave AMA at this point, if refusing interventions. This RN relayed new information to the patient/family, who then decided to stay for further evaluation. Order placed for bolus, as well as consult to A for further opinion.

## 2022-08-12 NOTE — CONSULTS
Patient Name:  Vijay Foote  YOB: 1940  MRN:  [id number]  Date of Admission:  8/11/2022  Date of Consult:  8/12/2022  Patient Care Team:  German Mccray MD as PCP - General (Internal Medicine)  Alicia Pickens MD as Consulting Physician (Interventional Cardiology)  Armani Siddiqui MD as Consulting Physician (Pulmonary Disease)    Inpatient Hospitalist Consult  Consult performed by: Bria Edwards APRN  Consult ordered by: Wali Orellana MD  Reason for consult: Consult to assist with postoperative hypotension        Subjective   History of Present Illness  Mr. Foote is a 82 y.o. male that has been admitted to [facility] following elective RIGHT REVERSE TOTAL SHOULDER ARTHROPLASTY.  He has been admitted to an orthopedic floor following surgery and we were asked to see and assist with his medical problems, specifically relating to his hypotension.  At the time of my visit he denies any chest pain, SOA, nausea, vomiting or diarrhea.  He has tolerated a diet.  He does complain of expected postoperative discomfort.  He reports being in a normal state of health leading up to surgery.  His blood pressure today has been as low as 81/45.  Antihypertensives including metoprolol, Lasix, lisinopril have been held this morning.  He was given a 500 cc bolus of fluids.  Blood pressure is improved to 106/59.  Wife is at bedside, a retired nurse, and she makes it clear that his blood pressure always runs on the low side.  Patient denies lightheadedness, shortness of breath, chest pain and feels fine wants to go home.  Documentation from recent office visit with cardiology reviewed and blood pressure at that time was 102/52.         Past Medical History:   Diagnosis Date   • Asthma    • Cardiomyopathy (HCC)    • COPD (chronic obstructive pulmonary disease) (HCC)    • Coronary artery disease    • Disease of thyroid gland    • Hyperlipidemia    • Hypertension      Past  Surgical History:   Procedure Laterality Date   • CARDIAC CATHETERIZATION     • MASTOIDECTOMY Right    • ORIF ANKLE FRACTURE Right    • ROTATOR CUFF REPAIR Left    • TONSILLECTOMY     • TOTAL SHOULDER ARTHROPLASTY W/ DISTAL CLAVICLE EXCISION Right 2022    Procedure: RIGHT REVERSE TOTAL SHOULDER ARTHROPLASTY;  Surgeon: Wali Orellana MD;  [facility] [facility] [place] [facility]  Service: Orthopedics;  Laterality: Right;   • TRIGGER FINGER RELEASE Left      Family History   Problem Relation Age of Onset   • Malig Hyperthermia Neg Hx      Social History     Tobacco Use   • Smoking status: Former Smoker     Years: 50.00     Quit date:      Years since quittin.6   Substance Use Topics   • Alcohol use: Yes     Comment: SOCIAL   • Drug use: Not Currently     No medications prior to admission.     Allergies:  Patient has no known allergies.    Review of Systems   Constitutional: Negative for appetite change and fatigue.   HENT: Negative for trouble swallowing.    Respiratory: Negative for cough, choking, chest tightness, shortness of breath and stridor.    Cardiovascular: Negative for chest pain.   Gastrointestinal: Negative for abdominal distention, abdominal pain, blood in stool, constipation, diarrhea, nausea and vomiting.   Musculoskeletal: Negative for back pain.   Skin: Negative for pallor.   Neurological: Negative for dizziness.       Objective      Vital Signs  Temp:  [97 °F (36.1 °C)-98.1 °F (36.7 °C)] 97 °F (36.1 °C)  Heart Rate:  [] 82  Resp:  [16-22] 16  BP: ()/(40-60) 90/54  Body mass index is 27.44 kg/m².    Physical Exam  Vitals and nursing note reviewed.   Constitutional:       Appearance: He is well-developed. He is not ill-appearing.   HENT:      Head: Normocephalic and atraumatic.   Eyes:      Conjunctiva/sclera: Conjunctivae normal.   Neck:      Trachea: No tracheal deviation.   Cardiovascular:      Rate and Rhythm: Normal rate and regular rhythm.   Pulmonary:      Effort: Pulmonary effort  is normal. No respiratory distress.      Breath sounds: Normal breath sounds.   Abdominal:      General: Bowel sounds are normal. There is no distension.      Palpations: Abdomen is soft. There is no mass.      Tenderness: There is no abdominal tenderness. There is no guarding or rebound.   Musculoskeletal:         General: Normal range of motion.      Cervical back: Normal range of motion.   Skin:     General: Skin is warm and dry.   Neurological:      General: No focal deficit present.      Mental Status: He is alert and oriented to person, place, and time.   Psychiatric:         Judgment: Judgment normal.         Results Review:   I reviewed the patient's new clinical results.  I reviewed the patient's new imaging results and agree with the interpretation.  I reviewed the patient's other test results and agree with the interpretation         Assessment/Plan     Active Hospital Problems    Diagnosis  POA   • Postoperative hypotension [I95.81]  Yes   • Hyperlipidemia [E78.5]  Yes   • COPD (chronic obstructive pulmonary disease) ([facility]) [J44.9]  Yes   • S/P reverse total shoulder arthroplasty, right [Z96.611]  Not Applicable      Resolved Hospital Problems   No resolved problems to display.       Mr. Foote is a 82 y.o. male who is s/p RIGHT REVERSE TOTAL SHOULDER ARTHROPLASTY.    · He seems to be doing well thus far postoperatively despite his low blood pressure.  I do agree that this is likely his baseline.  Wife states that his cardiologist is aware of his blood pressure but wants him to maintain current antihypertensive agents.  He received a 500 cc bolus in the ER.  Orthostatic blood pressures normal.  Okay with discharge to home but he needs to follow-up closely with cardiologist.  Wife, a nurse, will continue to monitor his blood pressure at home and contact cardiologist/PCP as needed.   Thank you very much for asking [facility] to be involved in this patient's care. We will follow along with you.      Bria BROWN  CECI Edwards  [facility]  08/12/22  15:02 EDT

## 2022-08-12 NOTE — THERAPY DISCHARGE NOTE
Acute Care - Occupational Therapy Discharge  [facility]    Patient Name: Vijay Foote  : 1940    MRN: [id number]                              Today's Date: 2022       Admit Date: 2022    Visit Dx:     ICD-10-CM ICD-9-CM   1. S/P reverse total shoulder arthroplasty, right  Z96.611 V43.61     Patient Active Problem List   Diagnosis   • S/P reverse total shoulder arthroplasty, right     Past Medical History:   Diagnosis Date   • Asthma    • Cardiomyopathy (HCC)    • COPD (chronic obstructive pulmonary disease) (HCC)    • Coronary artery disease    • Disease of thyroid gland    • Hyperlipidemia    • Hypertension      Past Surgical History:   Procedure Laterality Date   • CARDIAC CATHETERIZATION     • MASTOIDECTOMY Right    • ORIF ANKLE FRACTURE Right    • ROTATOR CUFF REPAIR Left    • TONSILLECTOMY     • TOTAL SHOULDER ARTHROPLASTY W/ DISTAL CLAVICLE EXCISION Right 2022    Procedure: RIGHT REVERSE TOTAL SHOULDER ARTHROPLASTY;  Surgeon: Wali Orellana MD;  [place] [facility] [place] [facility]  Service: Orthopedics;  Laterality: Right;   • TRIGGER FINGER RELEASE Left       General Information     Row Name 2259          OT Time and Intention    Document Type evaluation  -SM (r) SA (t) SM (c)     Mode of Treatment individual therapy;occupational therapy  -SM (r) SA (t) SM (c)     Row Name 22          General Information    Patient Profile Reviewed yes  -SM (r) SA (t) SM (c)     Prior Level of Function independent:;ADL's  -SM (r) SA (t) SM (c)     Existing Precautions/Restrictions non-weight bearing;right;shoulder  -SM (r) SA (t) SM (c)     Barriers to Rehab none identified  -SM (r) SA (t) SM (c)     Row Name 22 0959          Occupational Profile    Environmental Supports and Barriers (Occupational Profile) pt lives with wife, does wear O2 at baseline but uses no AD or DME  -SM (r) SA (t) SM (c)     Row Name 22          Living Environment    People in Home spouse  -SM  (r) SA (t) SM (c)     Row Name 08/12/22 0959          Cognition    Orientation Status (Cognition) oriented x 4  -SM (r) SA (t) SM (c)     Row Name 08/12/22 0959          Safety Issues, Functional Mobility    Safety Issues Affecting Function (Mobility) safety precaution awareness  -SM (r) SA (t) SM (c)     Impairments Affecting Function (Mobility) endurance/activity tolerance;grasp;range of motion (ROM);sensation/sensory awareness;strength  -SM (r) SA (t) SM (c)           User Key  (r) = Recorded By, (t) = Taken By, (c) = Cosigned By    Initials Name Provider Type    Rosita Friend, OT Occupational Therapist    Ana Grace OT Student OT Student               Mobility/ADL's     [facility] 08/12/22 1000          Bed Mobility    Comment, (Bed Mobility) pt UIC upon OT arrival and departure  -SM (r) SA (t) SM (c)     Row Name 08/12/22 1000          Transfers    Transfers sit-stand transfer;stand-sit transfer  -SM (r) SA (t) SM (c)     Sit-Stand [place] (Transfers) standby assist  -SM (r) SA (t) SM (c)     Stand-Sit [place] (Transfers) standby assist  -SM (r) SA (t) SM (c)     Row Name 08/12/22 1000          Sit-Stand Transfer    Comment, (Sit-Stand Transfer) x2  -SM (r) SA (t) SM (c)     Row Name 08/12/22 1000          Stand-Sit Transfer    Comment, (Stand-Sit Transfer) x2  -SM (r) SA (t) SM (c)     Row Name 08/12/22 1000          Functional Mobility    Functional Mobility- Comment pt reproting having just walked to and from bathroom with SBA and pleasantly declines further ambulation beyond STS at this time  -SM (r) SA (t) SM (c)     Row Name 08/12/22 1000          Activities of Daily Living    BADL Assessment/Intervention upper body dressing;lower body dressing  -SM (r) SA (t) SM (c)     Row Name 08/12/22 1000          Mobility    Extremity Weight-bearing Status right upper extremity  -SM (r) SA (t) SM (c)     Right Upper Extremity (Weight-bearing Status) non weight-bearing (NWB)  -SM (r) SA (t) SM  (c)     Row Name 08/12/22 1000          Upper Body Dressing Assessment/Training    [place] Level (Upper Body Dressing) doff;pull-over garment;don;front opening garment;verbal cues;nonverbal cues (demo/gesture);standby assist  -SM (r) SA (t) SM (c)     Position (Upper Body Dressing) supported sitting  -SM (r) SA (t) SM (c)     Comment, (Upper Body Dressing) pt and spouse educated on and praciticed how to complete using fabricio technique and maintaining shoulder precautions  -SM (r) SA (t) SM (c)     Row Name 08/12/22 1000          Lower Body Dressing Assessment/Training    [place] Level (Lower Body Dressing) don;pants/bottoms;verbal cues;nonverbal cues (demo/gesture);standby assist  -SM (r) SA (t) SM (c)     Position (Lower Body Dressing) supported sitting;unsupported standing  -SM (r) SA (t) SM (c)     Comment, (Lower Body Dressing) pt and spouse educated on and practiced completing while maintaining shoulder precautions  -SM (r) SA (t) SM (c)           User Key  (r) = Recorded By, (t) = Taken By, (c) = Cosigned By    Initials Name Provider Type    Rosita Friend OT Occupational Therapist    Ana Grace OT Student OT Student               Obj/Interventions     Row Name 08/12/22 1003          Sensory Assessment (Somatosensory)    Sensory Assessment (Somatosensory) right UE  -SM (r) SA (t) SM (c)     Right UE Sensory Assessment impaired  -SM (r) SA (t) SM (c)     Sensory Assessment pt reports R block not worn off yet  -SM (r) SA (t) SM (c)     Row Name 08/12/22 1003          Vision Assessment/Intervention    Visual Impairment/Limitations WFL  -SM (r) SA (t) SM (c)     Row Name 08/12/22 1003          Range of Motion Comprehensive    Comment, General Range of Motion LUE WFL, RUE limited 2/2 block not being worn off  -SM (r) SA (t) SM (c)     Row Name 08/12/22 1003          Strength Comprehensive (MMT)    Comment, General Manual Muscle Testing (MMT) Assessment LUE at leasy 3/5, RUE not tested 2/2 NWB  status and block not being worn off  -SM (r) SA (t) SM (c)     Row Name 08/12/22 1003          Motor Skills    Motor Skills functional endurance  -SM (r) SA (t) SM (c)     Functional Endurance good  -SM (r) SA (t) SM (c)     Therapeutic Exercise other (see comments)  due to R block not being worn off, OT educated and demonstrated HEP program, including shoulder pendulums, to pt and spouse. Pt did not complete this date  -SM (r) SA (t) SM (c)     Row Name 08/12/22 1003          Balance    Balance Assessment sitting static balance;sitting dynamic balance;standing static balance;standing dynamic balance  -SM (r) SA (t) SM (c)     Static Sitting Balance independent  -SM (r) SA (t) SM (c)     Dynamic Sitting Balance supervision  -SM (r) SA (t) SM (c)     Position, Sitting Balance sitting in chair;supported  -SM (r) SA (t) SM (c)     Static Standing Balance standby assist  -SM (r) SA (t) SM (c)     Dynamic Standing Balance standby assist  -SM (r) SA (t) SM (c)     Position/Device Used, Standing Balance unsupported  -SM (r) SA (t) SM (c)     Comment, Balance no LOB in session  -SM (r) SA (t) SM (c)           User Key  (r) = Recorded By, (t) = Taken By, (c) = Cosigned By    Initials Name Provider Type    Rosita Friend OT Occupational Therapist    nAa Grace OT Student OT Student               Goals/Plan     Row Name 08/12/22 1010          Dressing Goal 1 (OT)    Activity/Device (Dressing Goal 1, OT) upper body dressing  -SM (r) SA (t) SM (c)     [place]/Cues Needed (Dressing Goal 1, OT) verbal cues required;standby assist;nonverbal cues (demo/gesture) required  -SM (r) SA (t) SM (c)     Time Frame (Dressing Goal 1, OT) short term goal (STG);2 weeks  -SM (r) SA (t) SM (c)     Strategies/Barriers (Dressing Goal 1, OT) pt to complete using fabricio tehcnique and maintaining shoulder precautions  -SM (r) SA (t) SM (c)     Progress/Outcome (Dressing Goal 1, OT) goal met  -SM (r) SA (t) SM (c)     Row Name  08/12/22 1010          ROM Goal 1 (OT)    ROM Goal 1 (OT) Pt to verbalize understanding and demonstrate movements on non-affected extremity of HEP  -SM (r) SA (t) SM (c)     Time Frame (ROM Goal 1, OT) short term goal (STG);2 weeks  -SM (r) SA (t) SM (c)     Progress/Outcome (ROM Goal 1, OT) goal met  -SM (r) SA (t) SM (c)           User Key  (r) = Recorded By, (t) = Taken By, (c) = Cosigned By    Initials Name Provider Type    Rosita Friend, OT Occupational Therapist    Ana Grace OT Student OT Student               Clinical Impression     Row Name 08/12/22 1006          Pain Assessment    Pretreatment Pain Rating 0/10 - no pain  -SM (r) SA (t) SM (c)     Posttreatment Pain Rating 0/10 - no pain  -SM (r) SA (t) SM (c)     Pre/Posttreatment Pain Comment R blocck still  intact  -SM (r) SA (t) SM (c)     Row Name 08/12/22 1006          Plan of Care Review    Plan of Care Reviewed With patient;spouse  -SM (r) SA (t) SM (c)     Outcome Evaluation Pt is a 82 y.o. M POD 1 from R reverse TSA; per nsg notes, rapid response was called on pt last night due to epigastric pain, however pain resolved and has had no other issues since then. Prior to admit, pt lives with spouse, wears O2, was independent with ADLs, and uses no AD or DME. Pt today agreeable to OT eval and was explained shoulder precatuions and NWB status. Pt and spouse educated on HEP, but pt did not complete this date as R block was still intact. Pt and spouse additionally educated on and practiced UBD with fabricio technique and LBD while maintaining shoulder precautions. During session, pt completed x2 STS with SBA. OT recommends d/c home with assist and OP OT.  -SM (r) SA (t) SM (c)     Row Name 08/12/22 1006          Therapy Assessment/Plan (OT)    Rehab Potential (OT) good, to achieve stated therapy goals  -SM (r) SA (t) SM (c)     Criteria for Skilled Therapeutic Interventions Met (OT) yes;meets criteria;skilled treatment is necessary  -SM (r) SA  (t) SM (c)     Therapy Frequency (OT) evaluation only  -SM (r) SA (t) SM (c)     Row Name 08/12/22 1006          Therapy Plan Review/Discharge Plan (OT)    Anticipated Discharge Disposition (OT) home with assist;home with outpatient therapy services  -SM (r) SA (t) SM (c)     Row Name 08/12/22 1006          Positioning and Restraints    Pre-Treatment Position sitting in chair/recliner  -SM (r) SA (t) SM (c)     Post Treatment Position chair  -SM (r) SA (t) SM (c)     In Chair sitting;call light within reach;encouraged to call for assist;exit alarm on;with family/caregiver  -SM (r) SA (t) SM (c)           User Key  (r) = Recorded By, (t) = Taken By, (c) = Cosigned By    Initials Name Provider Type    Rosita Friend, OT Occupational Therapist    Ana Grace OT Student OT Student               Outcome Measures     Row Name 08/12/22 1011          How much help from another is currently needed...    Putting on and taking off regular lower body clothing? 3  -SM (r) SA (t) SM (c)     Bathing (including washing, rinsing, and drying) 3  -SM (r) SA (t) SM (c)     Toileting (which includes using toilet bed pan or urinal) 3  -SM (r) SA (t) SM (c)     Putting on and taking off regular upper body clothing 3  -SM (r) SA (t) SM (c)     Taking care of personal grooming (such as brushing teeth) 3  -SM (r) SA (t) SM (c)     Eating meals 4  -SM (r) SA (t) SM (c)     AM-PAC 6 Clicks Score (OT) 19  -SM (r) SA (t)     Row Name 08/12/22 0800          How much help from another person do you currently need...    Turning from your back to your side while in flat bed without using bedrails? 3  -KW     Moving from lying on back to sitting on the side of a flat bed without bedrails? 3  -KW     Moving to and from a bed to a chair (including a wheelchair)? 3  -KW     Standing up from a chair using your arms (e.g., wheelchair, bedside chair)? 3  -KW     Climbing 3-5 steps with a railing? 4  -KW     To walk in hospital room? 3  -KW      -PAC 6 Clicks Score (PT) 19  -KW     Highest level of mobility 6 --> Walked 10 steps or more  -KW     Row Name 08/12/22 1011          Functional Assessment    Outcome Measure Options AM-PAC 6 Clicks Daily Activity (OT)  -SM (r) SA (t)  (c)           User Key  (r) = Recorded By, (t) = Taken By, (c) = Cosigned By    Initials Name Provider Type    Kristel Perez, RN Registered Nurse    Rosita Friend, OT Occupational Therapist    Ana Grace, OT Student OT Student              Occupational Therapy Education                 Title: PT OT SLP Therapies (Done)     Topic: Occupational Therapy (Done)     Point: ADL training (Done)     Description:   Instruct learner(s) on proper safety adaptation and remediation techniques during self care or transfers.   Instruct in proper use of assistive devices.              Learning Progress Summary           Patient Acceptance, E,TB, VU,DU by  at 8/12/2022 1012    Comment: role of OT, shoulder precautions, NWB status, HEP, UBD with fabricio technique, LBD while maintaining shoulder precautions, d/c recommendations, plan of care   Significant Other Acceptance, E,TB, VU,DU by  at 8/12/2022 1012    Comment: role of OT, shoulder precautions, NWB status, HEP, UBD with fabricio technique, LBD while maintaining shoulder precautions, d/c recommendations, plan of care                   Point: Home exercise program (Done)     Description:   Instruct learner(s) on appropriate technique for monitoring, assisting and/or progressing therapeutic exercises/activities.              Learning Progress Summary           Patient Acceptance, E,TB, VU,DU by  at 8/12/2022 1012    Comment: role of OT, shoulder precautions, NWB status, HEP, UBD with fabricio technique, LBD while maintaining shoulder precautions, d/c recommendations, plan of care   Significant Other Acceptance, E,TB, VU,DU by  at 8/12/2022 1012    Comment: role of OT, shoulder precautions, NWB status, HEP, UBD with fabricio technique,  LBD while maintaining shoulder precautions, d/c recommendations, plan of care                   Point: Precautions (Done)     Description:   Instruct learner(s) on prescribed precautions during self-care and functional transfers.              Learning Progress Summary           Patient Acceptance, E,TB, VU,DU by  at 8/12/2022 1012    Comment: role of OT, shoulder precautions, NWB status, HEP, UBD with fabricio technique, LBD while maintaining shoulder precautions, d/c recommendations, plan of care   Significant Other Acceptance, E,TB, VU,DU by  at 8/12/2022 1012    Comment: role of OT, shoulder precautions, NWB status, HEP, UBD with fabricio technique, LBD while maintaining shoulder precautions, d/c recommendations, plan of care                   Point: Body mechanics (Done)     Description:   Instruct learner(s) on proper positioning and spine alignment during self-care, functional mobility activities and/or exercises.              Learning Progress Summary           Patient Acceptance, E,TB, VU,DU by  at 8/12/2022 1012    Comment: role of OT, shoulder precautions, NWB status, HEP, UBD with fabricio technique, LBD while maintaining shoulder precautions, d/c recommendations, plan of care   Significant Other Acceptance, E,TB, VU,DU by  at 8/12/2022 1012    Comment: role of OT, shoulder precautions, NWB status, HEP, UBD with fabricio technique, LBD while maintaining shoulder precautions, d/c recommendations, plan of care                               User Key     Initials Effective Dates Name Provider Type Discipline     05/23/22 -  Ana Reyes OT Student OT Student OT              OT Recommendation and Plan  Therapy Frequency (OT): evaluation only  Plan of Care Review  Plan of Care Reviewed With: patient, spouse  Outcome Evaluation: Pt is a 82 y.o. M POD 1 from  reverse [facility]; per nsg notes, rapid response was called on pt last night due to epigastric pain, however pain resolved and has had no other issues since then.  Prior to admit, pt lives with spouse, wears O2, was independent with ADLs, and uses no AD or DME. Pt today agreeable to OT eval and was explained shoulder precatuions and NWB status. Pt and spouse educated on HEP, but pt did not complete this date as R block was still intact. Pt and spouse additionally educated on and practiced UBD with fabricio technique and LBD while maintaining shoulder precautions. During session, pt completed x2 STS with SBA. OT recommends d/c home with assist and OP OT.  Plan of Care Reviewed With: patient, spouse  Outcome Evaluation: Pt is a 82 y.o. M POD 1 from R reverse TSA; per nsg notes, rapid response was called on pt last night due to epigastric pain, however pain resolved and has had no other issues since then. Prior to admit, pt lives with spouse, wears O2, was independent with ADLs, and uses no AD or DME. Pt today agreeable to OT eval and was explained shoulder precatuions and NWB status. Pt and spouse educated on HEP, but pt did not complete this date as R block was still intact. Pt and spouse additionally educated on and practiced UBD with fabricio technique and LBD while maintaining shoulder precautions. During session, pt completed x2 STS with SBA. OT recommends d/c home with assist and OP OT.     Time Calculation:    Time Calculation- OT     Row Name 08/12/22 1013             Time Calculation- OT    OT Start Time 0858  -SM (r) SA (t) SM (c)      OT Stop Time 0918  -SM (r) SA (t) SM (c)      OT Time Calculation (min) 20 min  -SM (r) SA (t)      Total Timed Code Minutes- OT 14 minute(s)  -SM (r) SA (t) SM (c)      OT Received On 08/12/22  -SM (r) SA (t) SM (c)              Timed Charges    07579 - OT Therapeutic Activity Minutes 14  -SM (r) SA (t) SM (c)              Untimed Charges    OT Eval/Re-eval Minutes 6  -SM (r) SA (t) SM (c)              Total Minutes    Timed Charges Total Minutes 14  -SM (r) SA (t)      Untimed Charges Total Minutes 6  -SM (r) SA (t)       Total Minutes 20  -SM  (r) SA (t)            User Key  (r) = Recorded By, (t) = Taken By, (c) = Cosigned By    Initials Name Provider Type    Rosita Friend, OT Occupational Therapist     Ana Reyes, OT Student OT Student              Therapy Charges for Today     Code Description Service Date Service Provider Modifiers Qty    [id number]  OT THERAPEUTIC ACT EA 15 MIN 8/12/2022 Ana Reyes, OT Student GO 1    [id number]  OT EVAL LOW COMPLEXITY 2 8/12/2022 ReyesAna OT Student GO 1             OT Discharge Summary  Anticipated Discharge Disposition (OT): home with assist, home with outpatient therapy services    SAVANNAH Bentley  8/12/2022

## 2022-08-12 NOTE — DISCHARGE INSTRUCTIONS
Dr. Orellana  [address] [facility] [address]  [phone]    TOTAL & REVERSE TOTAL SHOULDER REPLACEMENT  HOSPITAL DISCHARGE INSTRUCTIONS     GENERAL INFORMATION: With improved surgical techniques for total shoulder and reverse total shoulder replacement and the [place] of ultrasound guided long acting nerve blocks, the hospital stay for patients has decreased significantly. Patients generally go home the following morning after consultation with a physical therapist.  SPECIFIC POST-OP INSTRUCTIONS:  * FOLLOW UP APPOINTMENT: You will need to call our office [phone] and make an appointment to follow up 10-14 days after your date of surgery. We can see you back sooner if there are any problems or concerns.   * BLOOD THINNERS: All patients will be on some type of blood thinner post-op to prevent blood clot. In the hospital, we often use a blood thinning shot the first day post-op. Most patients who are not already on a blood thinner are discharged on over-the-counter aspirin 325mg daily. If you were taking a blood thinner prior to surgery, we will have you resume this on the first day post-op.   * STAPLES: Staples are taken our 10-14 days post-op. This will be done during your first post-op appointment in our office.   * ICE: Ice helps to decrease both pain and swelling. Ice should be applied to the shoulder 20-25 minutes each hour while awake.   * DRESSING CHANGES: We ask that you keep the incision clean and dry. Please use water proof bandaids to cover incision while showering. These can be purchased at your local pharmacy. They may need to be overlapped to cover entire length of incision. These can be changed daily or as needed. Do not use any ointment on the incision.   * SHOWERING: The wound edges typically come together and seal by 3-5 days post-op if there is no drainage. Again, please use waterproof bandaids to cover incision while showering. DO NOT SUBMERSE SHOULDER IN POOL,HOT TUB OR BATH UNTIL AT LEAST 3-4  WEEKS POST-OP (EVEN WITH WATERPROOF BANDAIDS).  * PAIN  MEDICINE: You will be given a prescription for oral pain medication prior to discharge from the hospital. Please let us know if you have a sensitivity to certain pain medications prior to discharge. Additional pain medication prescriptions can be written, but must be picked up at either our [place] or [place] office locations. They can NOT be called into your pharmacy.   * ORAL ANTI-INFLAMMATORIES:   You will be asked to discontinue ALL oral anti-inflammatories prior to surgery. You can resume these the first day post-op.These can be combined with the oral pain medications safely. DO NOT TAKE ADDITIONAL TYLENOL WITH THE NARCOTIC PAIN MEDICATION (it already has Tylenol in it). If you were not taking an anti-inflammatory pre-op, you can start one on the first day post-op. It will help decrease pain and swelling. Typical medications and dosages are as follows:   Advil/Motrin/Ibuprofen 200mg, 4 pills every 8 hours   Aleve/Naproxen Sodium 220mg, 2 pills every 12 hours  * SLING: We recommend you wear the sling at all times, other than when showering or doing physical therapy exercises.    * WEIGHT BEARING: We ask that you be non-weight bearing on the operative shoulder. Do not use the operative arm to lift/push/pull greater than 5lbs.   * PHYSICAL THERAPY: A physical therapist will see you in the hospital your first day post-op. They will teach you some very gentle range of motion exercises to do at home for the first 10-14 days. After we see you in the office during your first post-op visit, we will give you a prescription to start formal physical therapy. This can be done downstairs at [facility] or at a location of your choice. Physical therapy is typically 2-3 times a week for 4-6 weeks, depending on the individual and their progress.   * DRIVING A CAR: Medically/legally we can not recommend you drive a car while in the sling or while on pain medication (roughly  "10-14 days).   * RETURNING TO DAILY AND RECREATIONAL ACTIVITIES: For the most part patients can progress to daily activities as tolerated (keeping in mind the restrictions listed above). Initially, we do not want you to \"overdo\" it in an attempt to minimize post-op pain and swelling. Once the swelling is controlled, you can progress with activities as tolerated. Pain and swelling should be your guide to increasing your activity level.   * RETURN TO WORK: The return to work date depends on many factors and is very dependent on the individual. You would most likely be able to return to a sedentary job after your first post-op visit (10-14 days after surgery). A more physical job would obviously require a longer recovery time before return to work.   "

## 2022-08-12 NOTE — PROGRESS NOTES
Orthopedic Progress Note    Subjective     Post-Operative Day: POD #1  Systemic or Specific Complaints: No Complaints. Pain is under control with PO meds. Effects of block have resolved     Objective     Vital signs in last 24 hours:  Temp:  [97.1 °F (36.2 °C)-98.1 °F (36.7 °C)] 98 °F (36.7 °C)  Heart Rate:  [69-90] 69  Resp:  [14-22] 20  BP: ()/(46-71) 92/60    General: alert, appears stated age and cooperative   Neurovascular: Radial nerve: Intact, Ulnar nerve: Intact and Median nerve: Intact  Radial pulse: 2+.   Wound: Dressing clean and dry. No evidence of infection.   Range of Motion: Hand/Wrist/elbow ROM WNL. ROM shoulder not tested.     Data Review  CBC:  Results from last 7 days   Lab Units 08/12/22  0518   HEMOGLOBIN g/dL 12.7*   HEMATOCRIT % 40.0       Assessment & Plan     IMPRESSION:stable     Pain Relief: some relief    PLAN: D/C home today after physical therapy and dressing change. Follow up in office 10-14 days.     Activity: Stay in sling.      LOS: 0 days     Wali Orellana MD    Date: 8/12/2022  Time: 07:50 EDT

## 2022-08-12 NOTE — DISCHARGE SUMMARY
d   Orthopedic Discharge Summary      Patient: Vijay Foote      YOB: 1940    Medical Record Number: [id number]    Attending Physician: Wali Orellana MD  Consulting Physician(s):   Date of Admission: 8/11/2022  5:21 AM  Date of Discharge:       Patient Active Problem List   Diagnosis   • S/P reverse total shoulder arthroplasty, right     [unfilled]    Allergies: No Known Allergies    Current Medications:     Discharge Medications      ASK your doctor about these medications      Instructions Start Date   albuterol (2.5 MG/3ML) 0.083% nebulizer solution  Commonly known as: PROVENTIL   2.5 mg, Nebulization, Every 4 Hours PRN      albuterol sulfate  (90 Base) MCG/ACT inhaler  Commonly known as: PROVENTIL HFA;VENTOLIN HFA;PROAIR HFA   2 puffs, Inhalation, Every 4 Hours PRN      budesonide 1 MG/2ML nebulizer solution  Commonly known as: PULMICORT   1 mg, Nebulization, 2 Times Daily      Chlorhexidine Gluconate Cloth 2 % pads   Apply externally      Fasenra Pen 30 MG/ML solution auto-injector  Generic drug: Benralizumab   Subcutaneous, INJECTION EVERY 8 WEEKS      furosemide 40 MG tablet  Commonly known as: LASIX   40 mg, Oral, Daily      ipratropium 0.02 % nebulizer solution  Commonly known as: ATROVENT   500 mcg, Nebulization, 4 Times Daily - RT      levothyroxine 125 MCG tablet  Commonly known as: SYNTHROID, LEVOTHROID   125 mcg, Oral, Daily      lisinopril 2.5 MG tablet  Commonly known as: PRINIVIL,ZESTRIL   2.5 mg, Oral, Daily      metoprolol tartrate 25 MG tablet  Commonly known as: LOPRESSOR   25 mg, Oral, 2 Times Daily      mupirocin 2 % nasal ointment  Commonly known as: BACTROBAN   Nasal, 2 Times Daily      rosuvastatin 40 MG tablet  Commonly known as: CRESTOR   40 mg, Oral, Nightly                 Past Medical History:   Diagnosis Date   • Asthma    • Cardiomyopathy (HCC)    • COPD (chronic obstructive pulmonary disease) (HCC)    • Coronary artery disease    • Disease of thyroid gland     • Hyperlipidemia    • Hypertension         Past Surgical History:   Procedure Laterality Date   • CARDIAC CATHETERIZATION     • MASTOIDECTOMY Right    • ORIF ANKLE FRACTURE Right    • ROTATOR CUFF REPAIR Left    • TONSILLECTOMY     • TRIGGER FINGER RELEASE Left         Social History     Occupational History   • Not on file   Tobacco Use   • Smoking status: Former Smoker     Years: 50.00     Quit date:      Years since quittin.6   • Smokeless tobacco: Not on file   Substance and Sexual Activity   • Alcohol use: Yes     Comment: SOCIAL   • Drug use: Not Currently   • Sexual activity: Not on file      Social History     Social History Narrative   • Not on file        Family History   Problem Relation Age of Onset   • Malig Hyperthermia Neg Hx          Physical Exam: 82 y.o. male  General Appearance:    Alert, cooperative, in no acute distress                      Vitals:    22 2323 22 0353 22 0637 22 0647   BP: (!) 88/46 92/60     BP Location: Left arm Left arm     Patient Position: Sitting Sitting     Pulse: 83 80 69    Resp: 20 20 20    Temp: 98.1 °F (36.7 °C) 98 °F (36.7 °C)     TempSrc: Oral Oral     SpO2: 95% 95% 97% 99%   Weight:       Height:                                                        Extremities:   Incision intact without signs or symptoms of infection.               Neurovascular status remains intact to operative extremity.      Moves all extremities well, no edema, no cyanosis, no              redness   Pulses:   Pulses palpable and equal bilaterally   Skin:   No bleeding, bruising or rash   Lymph nodes:   No palpable adenopathy   Neurologic:   Cranial nerves 2 - 12 grossly intact, sensation intact           Hospital Course:  82 y.o. male admitted to [facility] to services of Wali Orellana MD   Antibiotic and VTE prophylaxis were per SCIP protocols. Post-operatively the patient transferred to the post-operative floor where the patient underwent  mobilization therapy that included active as well as passive ROM exercises. Opioids were titrated to achieve appropriate pain management to allow for participation in mobilization exercises. Vital signs are now stable. The incision is intact without signs or symptoms of infection. Operative extremity neurovascular status remains intact.   Appropriate education re: incision care, activity levels, medications, and follow up visits was completed and all questions were answered. The patient is now deemed stable for discharge to Home.      Discharge and Follow up Instructions: Follow up in 10-14 days.        Date: [unfilled]  Wali Orellana MD

## 2022-08-12 NOTE — CODE DOCUMENTATION
Patient Name:  Vijay Foote  YOB: 1940  MRN:  [id number]  Admit Date:  8/11/2022    Visit Diagnoses: No diagnosis found.    Reason For Rapid: Chest pain    RN Communicated With:primary RN    Rapid Outcome: remain on unit    Communication From Rapid Team: Patient c/o epigastric pain that is aching in nature, does not radiate and is intermittent. Pt states he thinks it's gas, that he can feel it in his stomach. He rated the pain an 8 at first and now states it's gone. Pt is baseline on O2 at home and now. He denies any dizziness or light headedness. EKG and troponin ordered. Primary RN to call MD on call and update.        Most Recent Vital Signs  Temp:  [97.1 °F (36.2 °C)-98 °F (36.7 °C)] 97.4 °F (36.3 °C)  Heart Rate:  [59-90] 80  Resp:  [14-22] 22  BP: ()/(47-89) 88/48  SpO2:  [94 %-99 %] 97 %  on  Flow (L/min):  [2-4] 3;   Device (Oxygen Therapy): nasal prongs    Labs:  Results from last 7 days   Lab Units 08/09/22  1220   COVID19  Not Detected     Glucose   Date/Time Value Ref Range Status   08/11/2022 2207 144 (H) 70 - 130 mg/dL Final     Comment:     Meter: [id number] : [id number] Candace AGUAYO RN     No results found for: SITE, ALLENTEST, PHART, TEQ4EMG, PO2ART, HMX1ENE, BASEEXCESS, T5LVFQPE, HGBBG, HCTABG, OXYHEMOGLOBI, METHHGBN, CARBOXYHGB, CO2CT, BAROMETRIC, MODALITY, FIO2      Estimated Creatinine Clearance: 60 mL/min (by C-G formula based on SCr of 0.96 mg/dL).          No results found for: STREPPNEUAG, LEGANTIGENUR        NIH Stroke Scale:                                                         Please refer to full rapid documentation on summary page under Index / Code Timeline

## 2022-08-12 NOTE — PROGRESS NOTES
Continued Stay Note  [facility]     Patient Name: Vijay Foote  MRN: [id number]  Today's Date: 8/12/2022    Admit Date: 8/11/2022     Discharge Plan     Row Name 08/12/22 1547       Plan    Final Discharge Disposition Code 01 - home or self-care    Final Note Outpatient PT               Discharge Codes    No documentation.               Expected Discharge Date and Time     Expected Discharge Date Expected Discharge Time    Aug 12, 2022             Amparo Green RN

## 2022-08-19 ENCOUNTER — TELEPHONE (OUTPATIENT)
Dept: ORTHOPEDIC SURGERY | Facility: HOSPITAL | Age: 82
End: 2022-08-19

## 2022-08-19 NOTE — TELEPHONE ENCOUNTER
Called and spoke with Mr. Foote to see how he is doing as he is 1 week SP TSA. He said he is doing pretty good. Still has quite a bit of swelling, but that's par for the course. He is doing the exercises. Pian is controlled. His wife was a nurse and she is taking good care of him. He doesn't have any questions for me at this time. Mr. Foote was given my contact information should he need anything. He voiced understanding and appreciated the call.

## 2023-10-27 ENCOUNTER — OFFICE (OUTPATIENT)
Dept: URBAN - METROPOLITAN AREA CLINIC 76 | Facility: CLINIC | Age: 83
End: 2023-10-27

## 2023-10-27 VITALS
HEIGHT: 67 IN | WEIGHT: 165 LBS | HEART RATE: 85 BPM | OXYGEN SATURATION: 93 % | DIASTOLIC BLOOD PRESSURE: 62 MMHG | SYSTOLIC BLOOD PRESSURE: 104 MMHG

## 2023-10-27 DIAGNOSIS — K57.30 DIVERTICULOSIS OF LARGE INTESTINE WITHOUT PERFORATION OR ABS: ICD-10-CM

## 2023-10-27 DIAGNOSIS — Z86.010 PERSONAL HISTORY OF COLONIC POLYPS: ICD-10-CM

## 2023-10-27 PROCEDURE — 99203 OFFICE O/P NEW LOW 30 MIN: CPT | Performed by: NURSE PRACTITIONER

## 2023-12-21 ENCOUNTER — ON CAMPUS - OUTPATIENT (OUTPATIENT)
Dept: URBAN - METROPOLITAN AREA HOSPITAL 108 | Facility: HOSPITAL | Age: 83
End: 2023-12-21

## 2023-12-21 DIAGNOSIS — D12.3 BENIGN NEOPLASM OF TRANSVERSE COLON: ICD-10-CM

## 2023-12-21 DIAGNOSIS — K57.30 DIVERTICULOSIS OF LARGE INTESTINE WITHOUT PERFORATION OR ABS: ICD-10-CM

## 2023-12-21 DIAGNOSIS — Z86.010 PERSONAL HISTORY OF COLONIC POLYPS: ICD-10-CM

## 2023-12-21 DIAGNOSIS — D12.2 BENIGN NEOPLASM OF ASCENDING COLON: ICD-10-CM

## 2023-12-21 DIAGNOSIS — D12.4 BENIGN NEOPLASM OF DESCENDING COLON: ICD-10-CM

## 2023-12-21 DIAGNOSIS — Z09 ENCOUNTER FOR FOLLOW-UP EXAMINATION AFTER COMPLETED TREATMEN: ICD-10-CM

## 2023-12-21 PROCEDURE — 45385 COLONOSCOPY W/LESION REMOVAL: CPT | Mod: PT | Performed by: INTERNAL MEDICINE

## 2024-02-05 ENCOUNTER — OFFICE (OUTPATIENT)
Dept: URBAN - METROPOLITAN AREA CLINIC 76 | Facility: CLINIC | Age: 84
End: 2024-02-05

## 2024-02-05 VITALS
SYSTOLIC BLOOD PRESSURE: 103 MMHG | HEART RATE: 77 BPM | HEIGHT: 67 IN | DIASTOLIC BLOOD PRESSURE: 61 MMHG | WEIGHT: 166 LBS | OXYGEN SATURATION: 94 %

## 2024-02-05 DIAGNOSIS — Z86.010 PERSONAL HISTORY OF COLONIC POLYPS: ICD-10-CM

## 2024-02-05 DIAGNOSIS — R10.32 LEFT LOWER QUADRANT PAIN: ICD-10-CM

## 2024-02-05 DIAGNOSIS — J44.9 CHRONIC OBSTRUCTIVE PULMONARY DISEASE, UNSPECIFIED: ICD-10-CM

## 2024-02-05 PROCEDURE — 99214 OFFICE O/P EST MOD 30 MIN: CPT | Performed by: INTERNAL MEDICINE

## 2024-08-12 ENCOUNTER — OFFICE (OUTPATIENT)
Dept: URBAN - METROPOLITAN AREA CLINIC 76 | Facility: CLINIC | Age: 84
End: 2024-08-12
Payer: MEDICARE

## 2024-08-12 VITALS
HEART RATE: 51 BPM | WEIGHT: 163 LBS | SYSTOLIC BLOOD PRESSURE: 100 MMHG | OXYGEN SATURATION: 95 % | DIASTOLIC BLOOD PRESSURE: 60 MMHG | HEIGHT: 67 IN

## 2024-08-12 DIAGNOSIS — J44.9 CHRONIC OBSTRUCTIVE PULMONARY DISEASE, UNSPECIFIED: ICD-10-CM

## 2024-08-12 DIAGNOSIS — R10.32 LEFT LOWER QUADRANT PAIN: ICD-10-CM

## 2024-08-12 DIAGNOSIS — Z86.010 PERSONAL HISTORY OF COLONIC POLYPS: ICD-10-CM

## 2024-08-12 PROCEDURE — 99213 OFFICE O/P EST LOW 20 MIN: CPT | Performed by: INTERNAL MEDICINE

## 2025-03-10 ENCOUNTER — HOSPITAL ENCOUNTER (INPATIENT)
Facility: HOSPITAL | Age: 85
LOS: 8 days | Discharge: HOME | End: 2025-03-19
Attending: EMERGENCY MEDICINE | Admitting: INTERNAL MEDICINE
Payer: MEDICARE

## 2025-03-10 ENCOUNTER — APPOINTMENT (OUTPATIENT)
Dept: CT IMAGING | Facility: HOSPITAL | Age: 85
End: 2025-03-10
Payer: MEDICARE

## 2025-03-10 DIAGNOSIS — R00.0 TACHYCARDIA: ICD-10-CM

## 2025-03-10 DIAGNOSIS — R93.5 ABNORMAL CT OF THE ABDOMEN: ICD-10-CM

## 2025-03-10 DIAGNOSIS — R53.1 WEAKNESS GENERALIZED: Primary | ICD-10-CM

## 2025-03-10 DIAGNOSIS — I50.9 CONGESTIVE HEART FAILURE, UNSPECIFIED HF CHRONICITY, UNSPECIFIED HEART FAILURE TYPE: ICD-10-CM

## 2025-03-10 DIAGNOSIS — J44.9 CHRONIC OBSTRUCTIVE PULMONARY DISEASE, UNSPECIFIED COPD TYPE: ICD-10-CM

## 2025-03-10 DIAGNOSIS — I71.40 ABDOMINAL AORTIC ANEURYSM (AAA) WITHOUT RUPTURE, UNSPECIFIED PART: ICD-10-CM

## 2025-03-10 DIAGNOSIS — R33.9 URINARY RETENTION: ICD-10-CM

## 2025-03-10 DIAGNOSIS — R79.89 ELEVATED LACTIC ACID LEVEL: ICD-10-CM

## 2025-03-10 DIAGNOSIS — R31.9 HEMATURIA, UNSPECIFIED TYPE: ICD-10-CM

## 2025-03-10 DIAGNOSIS — C64.1 MALIGNANT NEOPLASM OF RIGHT KIDNEY: ICD-10-CM

## 2025-03-10 LAB
ABO GROUP BLD: NORMAL
ALBUMIN SERPL-MCNC: 3.7 G/DL (ref 3.5–5.2)
ALBUMIN/GLOB SERPL: 0.9 G/DL
ALP SERPL-CCNC: 63 U/L (ref 39–117)
ALT SERPL W P-5'-P-CCNC: 14 U/L (ref 1–41)
ANION GAP SERPL CALCULATED.3IONS-SCNC: 13.1 MMOL/L (ref 5–15)
AST SERPL-CCNC: 21 U/L (ref 1–40)
BASOPHILS # BLD AUTO: 0.01 10*3/MM3 (ref 0–0.2)
BASOPHILS NFR BLD AUTO: 0.1 % (ref 0–1.5)
BILIRUB SERPL-MCNC: 0.7 MG/DL (ref 0–1.2)
BLD GP AB SCN SERPL QL: NEGATIVE
BUN SERPL-MCNC: 27 MG/DL (ref 8–23)
BUN/CREAT SERPL: 22 (ref 7–25)
CALCIUM SPEC-SCNC: 10.9 MG/DL (ref 8.6–10.5)
CHLORIDE SERPL-SCNC: 96 MMOL/L (ref 98–107)
CO2 SERPL-SCNC: 26.9 MMOL/L (ref 22–29)
CREAT SERPL-MCNC: 1.23 MG/DL (ref 0.76–1.27)
D-LACTATE SERPL-SCNC: 1.6 MMOL/L (ref 0.5–2)
D-LACTATE SERPL-SCNC: 3.4 MMOL/L (ref 0.5–2)
DEPRECATED RDW RBC AUTO: 39.5 FL (ref 37–54)
EGFRCR SERPLBLD CKD-EPI 2021: 58.6 ML/MIN/1.73
EOSINOPHIL # BLD AUTO: 0 10*3/MM3 (ref 0–0.4)
EOSINOPHIL NFR BLD AUTO: 0 % (ref 0.3–6.2)
ERYTHROCYTE [DISTWIDTH] IN BLOOD BY AUTOMATED COUNT: 13.5 % (ref 12.3–15.4)
GLOBULIN UR ELPH-MCNC: 4.1 GM/DL
GLUCOSE SERPL-MCNC: 131 MG/DL (ref 65–99)
HCT VFR BLD AUTO: 36.9 % (ref 37.5–51)
HGB BLD-MCNC: 11.2 G/DL (ref 13–17.7)
HOLD SPECIMEN: NORMAL
HOLD SPECIMEN: NORMAL
IMM GRANULOCYTES # BLD AUTO: 0.04 10*3/MM3 (ref 0–0.05)
IMM GRANULOCYTES NFR BLD AUTO: 0.5 % (ref 0–0.5)
LIPASE SERPL-CCNC: 66 U/L (ref 13–60)
LYMPHOCYTES # BLD AUTO: 0.66 10*3/MM3 (ref 0.7–3.1)
LYMPHOCYTES NFR BLD AUTO: 7.5 % (ref 19.6–45.3)
MCH RBC QN AUTO: 24.9 PG (ref 26.6–33)
MCHC RBC AUTO-ENTMCNC: 30.4 G/DL (ref 31.5–35.7)
MCV RBC AUTO: 82 FL (ref 79–97)
MONOCYTES # BLD AUTO: 0.94 10*3/MM3 (ref 0.1–0.9)
MONOCYTES NFR BLD AUTO: 10.6 % (ref 5–12)
NEUTROPHILS NFR BLD AUTO: 7.18 10*3/MM3 (ref 1.7–7)
NEUTROPHILS NFR BLD AUTO: 81.3 % (ref 42.7–76)
NRBC BLD AUTO-RTO: 0 /100 WBC (ref 0–0.2)
PLATELET # BLD AUTO: 260 10*3/MM3 (ref 140–450)
PMV BLD AUTO: 9.3 FL (ref 6–12)
POTASSIUM SERPL-SCNC: 4.9 MMOL/L (ref 3.5–5.2)
PROT SERPL-MCNC: 7.8 G/DL (ref 6–8.5)
RBC # BLD AUTO: 4.5 10*6/MM3 (ref 4.14–5.8)
RH BLD: NEGATIVE
SODIUM SERPL-SCNC: 136 MMOL/L (ref 136–145)
T&S EXPIRATION DATE: NORMAL
WBC NRBC COR # BLD AUTO: 8.83 10*3/MM3 (ref 3.4–10.8)
WHOLE BLOOD HOLD COAG: NORMAL
WHOLE BLOOD HOLD SPECIMEN: NORMAL

## 2025-03-10 PROCEDURE — 84145 PROCALCITONIN (PCT): CPT | Performed by: EMERGENCY MEDICINE

## 2025-03-10 PROCEDURE — 87040 BLOOD CULTURE FOR BACTERIA: CPT

## 2025-03-10 PROCEDURE — 36415 COLL VENOUS BLD VENIPUNCTURE: CPT

## 2025-03-10 PROCEDURE — 86900 BLOOD TYPING SEROLOGIC ABO: CPT | Performed by: STUDENT IN AN ORGANIZED HEALTH CARE EDUCATION/TRAINING PROGRAM

## 2025-03-10 PROCEDURE — 85025 COMPLETE CBC W/AUTO DIFF WBC: CPT | Performed by: EMERGENCY MEDICINE

## 2025-03-10 PROCEDURE — 99285 EMERGENCY DEPT VISIT HI MDM: CPT

## 2025-03-10 PROCEDURE — 51798 US URINE CAPACITY MEASURE: CPT

## 2025-03-10 PROCEDURE — 74176 CT ABD & PELVIS W/O CONTRAST: CPT

## 2025-03-10 PROCEDURE — 86850 RBC ANTIBODY SCREEN: CPT | Performed by: STUDENT IN AN ORGANIZED HEALTH CARE EDUCATION/TRAINING PROGRAM

## 2025-03-10 PROCEDURE — 86901 BLOOD TYPING SEROLOGIC RH(D): CPT | Performed by: STUDENT IN AN ORGANIZED HEALTH CARE EDUCATION/TRAINING PROGRAM

## 2025-03-10 PROCEDURE — G0378 HOSPITAL OBSERVATION PER HR: HCPCS

## 2025-03-10 PROCEDURE — 86923 COMPATIBILITY TEST ELECTRIC: CPT

## 2025-03-10 PROCEDURE — 25810000003 SODIUM CHLORIDE 0.9 % SOLUTION: Performed by: EMERGENCY MEDICINE

## 2025-03-10 PROCEDURE — 80053 COMPREHEN METABOLIC PANEL: CPT | Performed by: EMERGENCY MEDICINE

## 2025-03-10 PROCEDURE — 25010000002 CEFTRIAXONE PER 250 MG: Performed by: EMERGENCY MEDICINE

## 2025-03-10 PROCEDURE — 83605 ASSAY OF LACTIC ACID: CPT | Performed by: EMERGENCY MEDICINE

## 2025-03-10 PROCEDURE — 83690 ASSAY OF LIPASE: CPT | Performed by: EMERGENCY MEDICINE

## 2025-03-10 RX ORDER — BISACODYL 5 MG/1
5 TABLET, DELAYED RELEASE ORAL DAILY PRN
Status: DISCONTINUED | OUTPATIENT
Start: 2025-03-10 | End: 2025-03-11

## 2025-03-10 RX ORDER — AMOXICILLIN 250 MG
2 CAPSULE ORAL 2 TIMES DAILY PRN
Status: DISCONTINUED | OUTPATIENT
Start: 2025-03-10 | End: 2025-03-11

## 2025-03-10 RX ORDER — ACETAMINOPHEN 325 MG/1
650 TABLET ORAL EVERY 4 HOURS PRN
Status: DISCONTINUED | OUTPATIENT
Start: 2025-03-10 | End: 2025-03-19 | Stop reason: HOSPADM

## 2025-03-10 RX ORDER — BISACODYL 10 MG
10 SUPPOSITORY, RECTAL RECTAL DAILY PRN
Status: DISCONTINUED | OUTPATIENT
Start: 2025-03-10 | End: 2025-03-11

## 2025-03-10 RX ORDER — POLYETHYLENE GLYCOL 3350 17 G/17G
17 POWDER, FOR SOLUTION ORAL DAILY PRN
Status: DISCONTINUED | OUTPATIENT
Start: 2025-03-10 | End: 2025-03-11

## 2025-03-10 RX ORDER — MORPHINE SULFATE 2 MG/ML
4 INJECTION, SOLUTION INTRAMUSCULAR; INTRAVENOUS ONCE AS NEEDED
Status: COMPLETED | OUTPATIENT
Start: 2025-03-10 | End: 2025-03-13

## 2025-03-10 RX ORDER — ONDANSETRON 2 MG/ML
4 INJECTION INTRAMUSCULAR; INTRAVENOUS EVERY 6 HOURS PRN
Status: DISCONTINUED | OUTPATIENT
Start: 2025-03-10 | End: 2025-03-19 | Stop reason: HOSPADM

## 2025-03-10 RX ORDER — ONDANSETRON 4 MG/1
4 TABLET, ORALLY DISINTEGRATING ORAL EVERY 6 HOURS PRN
Status: DISCONTINUED | OUTPATIENT
Start: 2025-03-10 | End: 2025-03-19 | Stop reason: HOSPADM

## 2025-03-10 RX ORDER — ONDANSETRON 2 MG/ML
8 INJECTION INTRAMUSCULAR; INTRAVENOUS ONCE AS NEEDED
Status: DISCONTINUED | OUTPATIENT
Start: 2025-03-10 | End: 2025-03-19 | Stop reason: HOSPADM

## 2025-03-10 RX ORDER — SODIUM CHLORIDE 0.9 % (FLUSH) 0.9 %
10 SYRINGE (ML) INJECTION AS NEEDED
Status: DISCONTINUED | OUTPATIENT
Start: 2025-03-10 | End: 2025-03-19 | Stop reason: HOSPADM

## 2025-03-10 RX ORDER — LIDOCAINE HYDROCHLORIDE 20 MG/ML
JELLY TOPICAL ONCE
Status: COMPLETED | OUTPATIENT
Start: 2025-03-10 | End: 2025-03-11

## 2025-03-10 RX ORDER — NITROGLYCERIN 0.4 MG/1
0.4 TABLET SUBLINGUAL
Status: DISCONTINUED | OUTPATIENT
Start: 2025-03-10 | End: 2025-03-11

## 2025-03-10 RX ADMIN — CEFTRIAXONE 2000 MG: 2 INJECTION, POWDER, FOR SOLUTION INTRAMUSCULAR; INTRAVENOUS at 23:08

## 2025-03-10 RX ADMIN — SODIUM CHLORIDE 500 ML: 9 INJECTION, SOLUTION INTRAVENOUS at 23:21

## 2025-03-10 NOTE — ED PROVIDER NOTES
EMERGENCY DEPARTMENT ENCOUNTER  Room Number:  11/11  Date of encounter:  3/10/2025  PCP: German Mccray MD  Patient Care Team:  German Mccray MD as PCP - General (Internal Medicine)  Alicia Pickens MD as Consulting Physician (Interventional Cardiology)  Armani Siddiqui MD as Consulting Physician (Pulmonary Disease)     HPI:  Context: Vijay Foote is a 84 y.o. male who presents to the ED c/o chief complaint of urinate.  Patient reports that he has been having hematuria for the last several days, began passing large clots yesterday, was able to urinate this morning but only a minimal amount, has been unable to urinate since.  Patient complains of pressure and the need to urinate.  Patient complains of lower abdominal pain, no nausea vomiting, no back pain.  Patient does have a history of staghorn calculus in 1 kidney and kidney cancer in the other, followed by nephrology.  Patient is not currently on a blood thinner but is on aspirin and Plavix, patient was previously on Eliquis until recent Watchman procedure.  Is always on oxygen secondary to chronic hypoxic respiratory failure secondary to COPD.    MEDICAL HISTORY REVIEW  Reviewed in EPIC    PAST MEDICAL HISTORY  Active Ambulatory Problems     Diagnosis Date Noted   • S/P reverse total shoulder arthroplasty, right 08/11/2022   • Postoperative hypotension 08/12/2022   • Hyperlipidemia    • COPD (chronic obstructive pulmonary disease)      Resolved Ambulatory Problems     Diagnosis Date Noted   • No Resolved Ambulatory Problems     Past Medical History:   Diagnosis Date   • Asthma    • Cardiomyopathy    • Coronary artery disease    • Disease of thyroid gland    • Hypertension        PAST SURGICAL HISTORY  Past Surgical History:   Procedure Laterality Date   • CARDIAC CATHETERIZATION     • MASTOIDECTOMY Right    • ORIF ANKLE FRACTURE Right    • ROTATOR CUFF REPAIR Left    • TONSILLECTOMY     • TOTAL SHOULDER ARTHROPLASTY W/ DISTAL  CLAVICLE EXCISION Right 8/11/2022    Procedure: RIGHT REVERSE TOTAL SHOULDER ARTHROPLASTY;  Surgeon: Wali Orellana MD;  Location: [facility] OR [facility] [place];  Service: Orthopedics;  Laterality: Right;   • TRIGGER FINGER RELEASE Left        FAMILY HISTORY  Family History   Problem Relation Age of Onset   • Malig Hyperthermia Neg Hx        SOCIAL HISTORY  Social History     Socioeconomic History   • Marital status:    Tobacco Use   • Smoking status: Former     Current packs/day: 0.00     Types: Cigarettes     Start date: 1960     Quit date: 2010     Years since quitting: 15.1   Substance and Sexual Activity   • Alcohol use: Yes     Comment: SOCIAL   • Drug use: Not Currently       ALLERGIES  Patient has no known allergies.    The patient's allergies have been reviewed    REVIEW OF SYSTEMS  All systems reviewed and negative except for those discussed in HPI.     PHYSICAL EXAM  I have reviewed the triage vital signs and nursing notes.  ED Triage Vitals   Temp Heart Rate Resp BP SpO2   03/10/25 1725 03/10/25 1725 03/10/25 1725 03/10/25 1735 03/10/25 1725   96.9 °F (36.1 °C) (!) 122 22 114/83 95 %      Temp src Heart Rate Source Patient Position BP Location FiO2 (%)   03/10/25 1725 -- -- -- --   Tympanic           General: No acute distress.  HENT: NCAT, PERRL, Nares patent.  Eyes: no scleral icterus.  Neck: trachea midline, no ROM limitations.  CV: regular rhythm, regular rate.  Respiratory: normal effort, CTAB.  Abdomen: soft, nondistended, lower abdominal fullness and tenderness to palpation, negative McBurney's, no rebound tenderness, no guarding or rigidity.  Musculoskeletal: no deformity.  Neuro: alert, moves all extremities, follows commands.  Skin: warm, dry.    LAB RESULTS  Recent Results (from the past 24 hours)   Comprehensive Metabolic Panel    Collection Time: 03/10/25  5:43 PM    Specimen: Arm, Right; Blood   Result Value Ref Range    Glucose 131 (H) 65 - 99 mg/dL    BUN 27 (H) 8 - 23 mg/dL    Creatinine 1.23  0.76 - 1.27 mg/dL    Sodium 136 136 - 145 mmol/L    Potassium 4.9 3.5 - 5.2 mmol/L    Chloride 96 (L) 98 - 107 mmol/L    CO2 26.9 22.0 - 29.0 mmol/L    Calcium 10.9 (H) 8.6 - 10.5 mg/dL    Total Protein 7.8 6.0 - 8.5 g/dL    Albumin 3.7 3.5 - 5.2 g/dL    ALT (SGPT) 14 1 - 41 U/L    AST (SGOT) 21 1 - 40 U/L    Alkaline Phosphatase 63 39 - 117 U/L    Total Bilirubin 0.7 0.0 - 1.2 mg/dL    Globulin 4.1 gm/dL    A/G Ratio 0.9 g/dL    BUN/Creatinine Ratio 22.0 7.0 - 25.0    Anion Gap 13.1 5.0 - 15.0 mmol/L    eGFR 58.6 (L) >60.0 mL/min/1.73   Lipase    Collection Time: 03/10/25  5:43 PM    Specimen: Arm, Right; Blood   Result Value Ref Range    Lipase 66 (H) 13 - 60 U/L   Lactic Acid, Plasma    Collection Time: 03/10/25  5:43 PM    Specimen: Arm, Right; Blood   Result Value Ref Range    Lactate 3.4 (C) 0.5 - 2.0 mmol/L   Green Top (Gel)    Collection Time: 03/10/25  5:43 PM   Result Value Ref Range    Extra Tube Hold for add-ons.    Lavender Top    Collection Time: 03/10/25  5:43 PM   Result Value Ref Range    Extra Tube hold for add-on    Gold Top - SST    Collection Time: 03/10/25  5:43 PM   Result Value Ref Range    Extra Tube Hold for add-ons.    Light Blue Top    Collection Time: 03/10/25  5:43 PM   Result Value Ref Range    Extra Tube Hold for add-ons.    CBC Auto Differential    Collection Time: 03/10/25  5:43 PM    Specimen: Arm, Right; Blood   Result Value Ref Range    WBC 8.83 3.40 - 10.80 10*3/mm3    RBC 4.50 4.14 - 5.80 10*6/mm3    Hemoglobin 11.2 (L) 13.0 - 17.7 g/dL    Hematocrit 36.9 (L) 37.5 - 51.0 %    MCV 82.0 79.0 - 97.0 fL    MCH 24.9 (L) 26.6 - 33.0 pg    MCHC 30.4 (L) 31.5 - 35.7 g/dL    RDW 13.5 12.3 - 15.4 %    RDW-SD 39.5 37.0 - 54.0 fl    MPV 9.3 6.0 - 12.0 fL    Platelets 260 140 - 450 10*3/mm3    Neutrophil % 81.3 (H) 42.7 - 76.0 %    Lymphocyte % 7.5 (L) 19.6 - 45.3 %    Monocyte % 10.6 5.0 - 12.0 %    Eosinophil % 0.0 (L) 0.3 - 6.2 %    Basophil % 0.1 0.0 - 1.5 %    Immature Grans % 0.5 0.0  - 0.5 %    Neutrophils, Absolute 7.18 (H) 1.70 - 7.00 10*3/mm3    Lymphocytes, Absolute 0.66 (L) 0.70 - 3.10 10*3/mm3    Monocytes, Absolute 0.94 (H) 0.10 - 0.90 10*3/mm3    Eosinophils, Absolute 0.00 0.00 - 0.40 10*3/mm3    Basophils, Absolute 0.01 0.00 - 0.20 10*3/mm3    Immature Grans, Absolute 0.04 0.00 - 0.05 10*3/mm3    nRBC 0.0 0.0 - 0.2 /100 WBC   Type & Screen    Collection Time: 03/10/25  5:43 PM    Specimen: Arm, Right; Blood   Result Value Ref Range    ABO Type A     RH type Negative     Antibody Screen Negative     T&S Expiration Date 3/13/2025 11:59:59 PM    STAT Lactic Acid, Reflex    Collection Time: 03/10/25 10:05 PM    Specimen: Blood   Result Value Ref Range    Lactate 1.6 0.5 - 2.0 mmol/L       I ordered the above labs and reviewed the results.    RADIOLOGY  CT Abdomen Pelvis Without Contrast  Result Date: 3/10/2025  CT ABDOMEN AND PELVIS WITHOUT CONTRAST:  HISTORY: Lower abdominal pain with hematuria and difficulty urinating, history of kidney stones, history of renal neoplasm  TECHNIQUE: Helical CT was performed of the abdomen and pelvis from the lung bases through the lesser trochanters without IV contrast. Reformatted images were provided in the coronal and sagittal planes. Radiation dose reduction techniques were utilized, including automated exposure control, and exposure modulation based on body size.  COMPARISON: None available.  FINDINGS:  Lung bases: No acute findings, elevation of the right hemidiaphragm.  Abdomen: The liver is unremarkable. There is cholelithiasis, but no CT evidence of cholecystitis or biliary obstruction. The spleen and pancreas appear normal. No evidence of acute abnormality in either adrenal gland. Left kidney demonstrates multiple calculi, including an up to 2.3 cm developing staghorn calculus. There is no hydronephrosis or ureterolithiasis. There is a very large right renal lower pole solid-appearing mass, about 9 cm in overall size. Hyperdense material in the  right renal collecting system and ureter may represent blood.  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 There is no suspicious retroperitoneal adenopathy.  There is no evidence of bowel obstruction. There is an up to 3.6 cm abdominal aortic aneurysm.  Pelvis: There is a Ayala catheter in place and there is prostatic hypertrophy, but there appears to be blood within the urinary bladder as well. There is no pelvic inflammatory change or other abnormal fluid collection. There is extensive sigmoid diverticulosis but no CT evidence of diverticulitis. There is no CT evidence of hernia or bowel obstruction.  There are spinal degenerative changes, but there is no acute bony abnormality.       Large right renal mass without hydronephrosis no dense material in the right renal collecting system, ureter and urinary bladder likely represent hematuria.  Multiple left renal calculi without hydronephrosis or ureterolithiasis.  Area of hyperdensity within the inferior vena cava adjacent to the renal midpole question caval tumor thrombus.  3.6 cm abdominal aortic aneurysm.  Extensive sigmoid diverticulosis without evidence of diverticulitis.     This report was finalized on 3/10/2025 10:03 PM by Dr. Vijay Stinson M.D on Workstation: [id number]        I ordered the above noted radiological studies. I reviewed the images and results. I agree with the radiologist interpretation.    PROCEDURES  Procedures    MEDICATIONS GIVEN IN ER  Medications   sodium chloride 0.9 % flush 10 mL (has no administration in time range)   sodium chloride 0.9 % flush 10 mL (has no administration in time range)   morphine injection 4 mg (has no administration in time range)   ondansetron (ZOFRAN) injection 8 mg (has no administration in time range)   cefTRIAXone (ROCEPHIN) 2,000 mg in sodium chloride 0.9 % 100 mL MBP (has no administration in time range)       PROGRESS, DATA  ANALYSIS, CONSULTS, AND MEDICAL DECISION MAKING  A complete history and physical exam have been performed.  All available laboratory and imaging results have been reviewed by myself prior to disposition.    MDM    After the initial H&P, I discussed pertinent information from history and physical exam with patient/family.  Discussed differential diagnosis.  Discussed plan for ED evaluation/workup/treatment.  All questions answered.  Patient/family is agreeable with plan.  ED Course as of 03/12/25 1053   Mon Mar 10, 2025   1849 My differential diagnosis for abdominal pain includes but is not limited to:  Gastritis, gastroenteritis, peptic ulcer disease, GERD, esophageal perforation, acute appendicitis, mesenteric adenitis, Meckel's diverticulum, epiploic appendagitis, diverticulitis, colon cancer, ulcerative colitis, Crohn's disease, intussusception, small bowel obstruction, adhesions, ischemic bowel, perforated viscus, ileus, obstipation, biliary colic, cholecystitis, cholelithiasis, Avery-Carter Andrzej, hepatitis, pancreatitis, common bile duct obstruction, cholangitis, bile leak, splenic trauma, splenic rupture, splenic infarction, splenic abscess, abdominal abscess, ascites, spontaneous bacterial peritonitis, hernia, UTI, cystitis, prostatitis, ureterolithiasis, urinary obstruction, ovarian cyst, torsion, pregnancy, ectopic pregnancy, PID, pelvic abscess, mittelschmerz, endometriosis, AAA, myocardial infarction, pneumonia, cancer, porphyria, DKA, medications, sickle cell, viral syndrome, zoster   [JG]   1902 800 mL on bladder scan, placing three-way catheter, beginning CBI. [JG]   2225 Still waiting for urinalysis results, nursing staff called lab, attempting to locate urine specimen in lab.  Patient has no leukocytosis but temperature is decreased, lactic acid is elevated, patient does have borderline blood pressure.  Starting patient with ceftriaxone empirically.  Plan for admission to the hospitalist with urology  consult. [JG]   2244 Phone call with FRANCISCO Rodriguez with [facility].  Discussed the patient, relevant history, exam, diagnostics, ED findings/progress, and concerns. They agree to admit the patient to telemetry observation under Dr. Garcia. Care assumed by the admitting physician at this time.     [JG]   2307 Phone call with Dr. Garcia, [facility].  Discussed the patient, relevant history, exam, diagnostics, ED findings/progress, and concerns.  He has reviewed CT imaging, he requested us to change out to a 24 [place] catheter and hand irrigate to attempt to irrigate clot out of bladder.  He request call back after attempt as he may consider taking patient to the OR.  They agree to consult.    [JG]   2307 Patient reassessed, discussed ED workup and results, discussed plan for admission, discussed urology recommendations. [JG]   Tue Mar 11, 2025   0033 Unable to irrigate clot through 22 [place] catheter.  We are going to place a 24 [place] catheter, manually irrigated bedside   [JK]   0205 24 [place] catheter has been placed.  We are additionally able to irrigate out some clot material, but unable to irrigate further.  A continuous bladder irrigation has been placed, the irrigant is draining.  Patient's bladder is not distended.  I spoke with Dr. Garcia, who stated that given that catheter is draining at this time, he will plan on seeing the patient in the morning, keep n.p.o. [JK]      ED Course User Index  [JG] Jose Che MD  [JK] Radha Lozano MD       AS OF 22:45 EDT VITALS:    BP - 99/65  HR - 82  TEMP - 96.9 °F (36.1 °C) (Tympanic)  O2 SATS - 100%    DIAGNOSIS  Final diagnoses:   Hematuria, unspecified type   Urinary retention   Tachycardia   Elevated lactic acid level   Malignant neoplasm of right kidney   Abnormal CT of the abdomen   Abdominal aortic aneurysm (AAA) without rupture, unspecified part         DISPOSITION  ADMISSION    Discussed treatment plan and reason for admission with pt/family and  admitting physician.  Pt/family voiced understanding of the plan for admission for further testing/treatment as needed.        Jose Che MD  03/10/25 0672       Jose Che MD  03/12/25 5328

## 2025-03-11 ENCOUNTER — APPOINTMENT (OUTPATIENT)
Dept: GENERAL RADIOLOGY | Facility: HOSPITAL | Age: 85
End: 2025-03-11
Payer: MEDICARE

## 2025-03-11 PROBLEM — N28.89 RENAL MASS: Status: ACTIVE | Noted: 2025-03-11

## 2025-03-11 PROBLEM — Z95.818 PRESENCE OF WATCHMAN LEFT ATRIAL APPENDAGE CLOSURE DEVICE: Status: ACTIVE | Noted: 2025-03-11

## 2025-03-11 LAB
ANION GAP SERPL CALCULATED.3IONS-SCNC: 10.7 MMOL/L (ref 5–15)
ANION GAP SERPL CALCULATED.3IONS-SCNC: 11.6 MMOL/L (ref 5–15)
ANISOCYTOSIS BLD QL: ABNORMAL
BASOPHILS # BLD AUTO: 0.02 10*3/MM3 (ref 0–0.2)
BASOPHILS # BLD MANUAL: 0 10*3/MM3 (ref 0–0.2)
BASOPHILS NFR BLD AUTO: 0.2 % (ref 0–1.5)
BASOPHILS NFR BLD MANUAL: 0 % (ref 0–1.5)
BUN SERPL-MCNC: 25 MG/DL (ref 8–23)
BUN SERPL-MCNC: 29 MG/DL (ref 8–23)
BUN/CREAT SERPL: 22.1 (ref 7–25)
BUN/CREAT SERPL: 23.8 (ref 7–25)
CALCIUM SPEC-SCNC: 8.2 MG/DL (ref 8.6–10.5)
CALCIUM SPEC-SCNC: 9 MG/DL (ref 8.6–10.5)
CHLORIDE SERPL-SCNC: 102 MMOL/L (ref 98–107)
CHLORIDE SERPL-SCNC: 105 MMOL/L (ref 98–107)
CO2 SERPL-SCNC: 22.4 MMOL/L (ref 22–29)
CO2 SERPL-SCNC: 25.3 MMOL/L (ref 22–29)
CREAT SERPL-MCNC: 1.05 MG/DL (ref 0.76–1.27)
CREAT SERPL-MCNC: 1.31 MG/DL (ref 0.76–1.27)
D-LACTATE SERPL-SCNC: 1.2 MMOL/L (ref 0.5–2)
D-LACTATE SERPL-SCNC: 1.7 MMOL/L (ref 0.5–2)
DEPRECATED RDW RBC AUTO: 41.8 FL (ref 37–54)
DEPRECATED RDW RBC AUTO: 44.4 FL (ref 37–54)
DEPRECATED RDW RBC AUTO: 44.9 FL (ref 37–54)
EGFRCR SERPLBLD CKD-EPI 2021: 54.3 ML/MIN/1.73
EGFRCR SERPLBLD CKD-EPI 2021: 70.9 ML/MIN/1.73
EOSINOPHIL # BLD AUTO: 0 10*3/MM3 (ref 0–0.4)
EOSINOPHIL # BLD MANUAL: 0 10*3/MM3 (ref 0–0.4)
EOSINOPHIL NFR BLD AUTO: 0 % (ref 0.3–6.2)
EOSINOPHIL NFR BLD MANUAL: 0 % (ref 0.3–6.2)
ERYTHROCYTE [DISTWIDTH] IN BLOOD BY AUTOMATED COUNT: 14 % (ref 12.3–15.4)
ERYTHROCYTE [DISTWIDTH] IN BLOOD BY AUTOMATED COUNT: 14.2 % (ref 12.3–15.4)
ERYTHROCYTE [DISTWIDTH] IN BLOOD BY AUTOMATED COUNT: 14.5 % (ref 12.3–15.4)
GIANT PLATELETS: ABNORMAL
GLUCOSE BLDC GLUCOMTR-MCNC: 142 MG/DL (ref 70–130)
GLUCOSE BLDC GLUCOMTR-MCNC: 144 MG/DL (ref 70–130)
GLUCOSE SERPL-MCNC: 112 MG/DL (ref 65–99)
GLUCOSE SERPL-MCNC: 145 MG/DL (ref 65–99)
HCT VFR BLD AUTO: 22.7 % (ref 37.5–51)
HCT VFR BLD AUTO: 26.5 % (ref 37.5–51)
HCT VFR BLD AUTO: 28.6 % (ref 37.5–51)
HCT VFR BLD AUTO: 31.6 % (ref 37.5–51)
HGB BLD-MCNC: 7 G/DL (ref 13–17.7)
HGB BLD-MCNC: 8.7 G/DL (ref 13–17.7)
HGB BLD-MCNC: 8.9 G/DL (ref 13–17.7)
HGB BLD-MCNC: 9.7 G/DL (ref 13–17.7)
IMM GRANULOCYTES # BLD AUTO: 0.07 10*3/MM3 (ref 0–0.05)
IMM GRANULOCYTES NFR BLD AUTO: 0.6 % (ref 0–0.5)
LYMPHOCYTES # BLD AUTO: 0.69 10*3/MM3 (ref 0.7–3.1)
LYMPHOCYTES # BLD MANUAL: 0.6 10*3/MM3 (ref 0.7–3.1)
LYMPHOCYTES NFR BLD AUTO: 5.7 % (ref 19.6–45.3)
LYMPHOCYTES NFR BLD MANUAL: 2 % (ref 5–12)
MAGNESIUM SERPL-MCNC: 1.9 MG/DL (ref 1.6–2.4)
MCH RBC QN AUTO: 25.7 PG (ref 26.6–33)
MCH RBC QN AUTO: 26.9 PG (ref 26.6–33)
MCH RBC QN AUTO: 27.9 PG (ref 26.6–33)
MCHC RBC AUTO-ENTMCNC: 30.8 G/DL (ref 31.5–35.7)
MCHC RBC AUTO-ENTMCNC: 31.1 G/DL (ref 31.5–35.7)
MCHC RBC AUTO-ENTMCNC: 32.8 G/DL (ref 31.5–35.7)
MCV RBC AUTO: 82.7 FL (ref 79–97)
MCV RBC AUTO: 84.9 FL (ref 79–97)
MCV RBC AUTO: 87.3 FL (ref 79–97)
MICROCYTES BLD QL: ABNORMAL
MONOCYTES # BLD AUTO: 1.77 10*3/MM3 (ref 0.1–0.9)
MONOCYTES # BLD: 0.3 10*3/MM3 (ref 0.1–0.9)
MONOCYTES NFR BLD AUTO: 14.6 % (ref 5–12)
NEUTROPHILS # BLD AUTO: 14.16 10*3/MM3 (ref 1.7–7)
NEUTROPHILS NFR BLD AUTO: 78.9 % (ref 42.7–76)
NEUTROPHILS NFR BLD AUTO: 9.55 10*3/MM3 (ref 1.7–7)
NEUTROPHILS NFR BLD MANUAL: 94 % (ref 42.7–76)
NRBC BLD AUTO-RTO: 0 /100 WBC (ref 0–0.2)
PLATELET # BLD AUTO: 198 10*3/MM3 (ref 140–450)
PLATELET # BLD AUTO: 233 10*3/MM3 (ref 140–450)
PLATELET # BLD AUTO: 278 10*3/MM3 (ref 140–450)
PMV BLD AUTO: 9.4 FL (ref 6–12)
PMV BLD AUTO: 9.4 FL (ref 6–12)
PMV BLD AUTO: 9.8 FL (ref 6–12)
POIKILOCYTOSIS BLD QL SMEAR: ABNORMAL
POTASSIUM SERPL-SCNC: 4.5 MMOL/L (ref 3.5–5.2)
POTASSIUM SERPL-SCNC: 5 MMOL/L (ref 3.5–5.2)
PROCALCITONIN SERPL-MCNC: 0.18 NG/ML (ref 0–0.25)
RBC # BLD AUTO: 2.6 10*6/MM3 (ref 4.14–5.8)
RBC # BLD AUTO: 3.12 10*6/MM3 (ref 4.14–5.8)
RBC # BLD AUTO: 3.46 10*6/MM3 (ref 4.14–5.8)
SODIUM SERPL-SCNC: 138 MMOL/L (ref 136–145)
SODIUM SERPL-SCNC: 139 MMOL/L (ref 136–145)
VARIANT LYMPHS NFR BLD MANUAL: 4 % (ref 19.6–45.3)
WBC MORPH BLD: NORMAL
WBC NRBC COR # BLD AUTO: 10.22 10*3/MM3 (ref 3.4–10.8)
WBC NRBC COR # BLD AUTO: 12.1 10*3/MM3 (ref 3.4–10.8)
WBC NRBC COR # BLD AUTO: 15.06 10*3/MM3 (ref 3.4–10.8)

## 2025-03-11 PROCEDURE — 83605 ASSAY OF LACTIC ACID: CPT | Performed by: NURSE PRACTITIONER

## 2025-03-11 PROCEDURE — C1751 CATH, INF, PER/CENT/MIDLINE: HCPCS

## 2025-03-11 PROCEDURE — 25010000002 ONDANSETRON PER 1 MG

## 2025-03-11 PROCEDURE — 86900 BLOOD TYPING SEROLOGIC ABO: CPT

## 2025-03-11 PROCEDURE — 02HV33Z INSERTION OF INFUSION DEVICE INTO SUPERIOR VENA CAVA, PERCUTANEOUS APPROACH: ICD-10-PCS | Performed by: HOSPITALIST

## 2025-03-11 PROCEDURE — P9100 PATHOGEN TEST FOR PLATELETS: HCPCS

## 2025-03-11 PROCEDURE — 99222 1ST HOSP IP/OBS MODERATE 55: CPT | Performed by: INTERNAL MEDICINE

## 2025-03-11 PROCEDURE — 25810000003 SODIUM CHLORIDE 0.9 % SOLUTION

## 2025-03-11 PROCEDURE — 82948 REAGENT STRIP/BLOOD GLUCOSE: CPT

## 2025-03-11 PROCEDURE — 25810000003 LACTATED RINGERS SOLUTION: Performed by: STUDENT IN AN ORGANIZED HEALTH CARE EDUCATION/TRAINING PROGRAM

## 2025-03-11 PROCEDURE — 85007 BL SMEAR W/DIFF WBC COUNT: CPT | Performed by: STUDENT IN AN ORGANIZED HEALTH CARE EDUCATION/TRAINING PROGRAM

## 2025-03-11 PROCEDURE — P9035 PLATELET PHERES LEUKOREDUCED: HCPCS

## 2025-03-11 PROCEDURE — 80048 BASIC METABOLIC PNL TOTAL CA: CPT

## 2025-03-11 PROCEDURE — 94640 AIRWAY INHALATION TREATMENT: CPT

## 2025-03-11 PROCEDURE — 85018 HEMOGLOBIN: CPT

## 2025-03-11 PROCEDURE — P9016 RBC LEUKOCYTES REDUCED: HCPCS

## 2025-03-11 PROCEDURE — 25810000003 SODIUM CHLORIDE 0.9 % SOLUTION: Performed by: STUDENT IN AN ORGANIZED HEALTH CARE EDUCATION/TRAINING PROGRAM

## 2025-03-11 PROCEDURE — 83605 ASSAY OF LACTIC ACID: CPT | Performed by: STUDENT IN AN ORGANIZED HEALTH CARE EDUCATION/TRAINING PROGRAM

## 2025-03-11 PROCEDURE — 85014 HEMATOCRIT: CPT

## 2025-03-11 PROCEDURE — 36430 TRANSFUSION BLD/BLD COMPNT: CPT

## 2025-03-11 PROCEDURE — 83735 ASSAY OF MAGNESIUM: CPT | Performed by: STUDENT IN AN ORGANIZED HEALTH CARE EDUCATION/TRAINING PROGRAM

## 2025-03-11 PROCEDURE — 25810000003 SODIUM CHLORIDE 0.9 % SOLUTION: Performed by: NURSE PRACTITIONER

## 2025-03-11 PROCEDURE — 85025 COMPLETE CBC W/AUTO DIFF WBC: CPT | Performed by: STUDENT IN AN ORGANIZED HEALTH CARE EDUCATION/TRAINING PROGRAM

## 2025-03-11 PROCEDURE — 80048 BASIC METABOLIC PNL TOTAL CA: CPT | Performed by: STUDENT IN AN ORGANIZED HEALTH CARE EDUCATION/TRAINING PROGRAM

## 2025-03-11 PROCEDURE — 25010000002 PIPERACILLIN SOD-TAZOBACTAM PER 1 G: Performed by: STUDENT IN AN ORGANIZED HEALTH CARE EDUCATION/TRAINING PROGRAM

## 2025-03-11 PROCEDURE — 94799 UNLISTED PULMONARY SVC/PX: CPT

## 2025-03-11 PROCEDURE — 85027 COMPLETE CBC AUTOMATED: CPT

## 2025-03-11 RX ORDER — ALBUTEROL SULFATE 0.83 MG/ML
2.5 SOLUTION RESPIRATORY (INHALATION) EVERY 4 HOURS PRN
Status: DISCONTINUED | OUTPATIENT
Start: 2025-03-11 | End: 2025-03-13

## 2025-03-11 RX ORDER — ROSUVASTATIN CALCIUM 20 MG/1
40 TABLET, COATED ORAL NIGHTLY
Status: DISCONTINUED | OUTPATIENT
Start: 2025-03-11 | End: 2025-03-19 | Stop reason: HOSPADM

## 2025-03-11 RX ORDER — SODIUM CHLORIDE 0.9 % (FLUSH) 0.9 %
10 SYRINGE (ML) INJECTION EVERY 12 HOURS SCHEDULED
Status: DISCONTINUED | OUTPATIENT
Start: 2025-03-11 | End: 2025-03-19 | Stop reason: HOSPADM

## 2025-03-11 RX ORDER — POLYETHYLENE GLYCOL 3350 17 G/17G
17 POWDER, FOR SOLUTION ORAL DAILY PRN
Status: DISCONTINUED | OUTPATIENT
Start: 2025-03-11 | End: 2025-03-14

## 2025-03-11 RX ORDER — SODIUM CHLORIDE 9 MG/ML
40 INJECTION, SOLUTION INTRAVENOUS AS NEEDED
Status: DISCONTINUED | OUTPATIENT
Start: 2025-03-11 | End: 2025-03-19 | Stop reason: HOSPADM

## 2025-03-11 RX ORDER — BISACODYL 5 MG/1
5 TABLET, DELAYED RELEASE ORAL DAILY PRN
Status: DISCONTINUED | OUTPATIENT
Start: 2025-03-11 | End: 2025-03-14

## 2025-03-11 RX ORDER — NOREPINEPHRINE BITARTRATE 0.03 MG/ML
.02-.3 INJECTION, SOLUTION INTRAVENOUS
Status: DISCONTINUED | OUTPATIENT
Start: 2025-03-11 | End: 2025-03-15

## 2025-03-11 RX ORDER — BUDESONIDE 1 MG/2ML
1 INHALANT ORAL 2 TIMES DAILY
Status: DISCONTINUED | OUTPATIENT
Start: 2025-03-11 | End: 2025-03-19 | Stop reason: HOSPADM

## 2025-03-11 RX ORDER — SODIUM CHLORIDE 0.9 % (FLUSH) 0.9 %
10 SYRINGE (ML) INJECTION AS NEEDED
Status: DISCONTINUED | OUTPATIENT
Start: 2025-03-11 | End: 2025-03-19 | Stop reason: HOSPADM

## 2025-03-11 RX ORDER — AMOXICILLIN 250 MG
2 CAPSULE ORAL 2 TIMES DAILY
Status: DISCONTINUED | OUTPATIENT
Start: 2025-03-11 | End: 2025-03-14

## 2025-03-11 RX ORDER — SODIUM CHLORIDE 9 MG/ML
75 INJECTION, SOLUTION INTRAVENOUS CONTINUOUS
Status: DISCONTINUED | OUTPATIENT
Start: 2025-03-11 | End: 2025-03-11

## 2025-03-11 RX ORDER — LIDOCAINE HYDROCHLORIDE 20 MG/ML
JELLY TOPICAL ONCE
Status: COMPLETED | OUTPATIENT
Start: 2025-03-11 | End: 2025-03-11

## 2025-03-11 RX ORDER — LEVOTHYROXINE SODIUM 125 UG/1
125 TABLET ORAL DAILY
Status: DISCONTINUED | OUTPATIENT
Start: 2025-03-11 | End: 2025-03-19 | Stop reason: HOSPADM

## 2025-03-11 RX ORDER — BISACODYL 10 MG
10 SUPPOSITORY, RECTAL RECTAL DAILY PRN
Status: DISCONTINUED | OUTPATIENT
Start: 2025-03-11 | End: 2025-03-14

## 2025-03-11 RX ORDER — NITROGLYCERIN 0.4 MG/1
0.4 TABLET SUBLINGUAL
Status: DISCONTINUED | OUTPATIENT
Start: 2025-03-11 | End: 2025-03-19 | Stop reason: HOSPADM

## 2025-03-11 RX ORDER — CLOPIDOGREL BISULFATE 75 MG/1
75 TABLET ORAL DAILY
COMMUNITY

## 2025-03-11 RX ORDER — SODIUM CHLORIDE 9 MG/ML
150 INJECTION, SOLUTION INTRAVENOUS CONTINUOUS
Status: ACTIVE | OUTPATIENT
Start: 2025-03-11 | End: 2025-03-12

## 2025-03-11 RX ADMIN — SODIUM CHLORIDE 500 ML: 900 INJECTION, SOLUTION INTRAVENOUS at 07:50

## 2025-03-11 RX ADMIN — SODIUM CHLORIDE 500 ML: 9 INJECTION, SOLUTION INTRAVENOUS at 03:57

## 2025-03-11 RX ADMIN — SODIUM CHLORIDE 150 ML/HR: 0.9 INJECTION, SOLUTION INTRAVENOUS at 20:22

## 2025-03-11 RX ADMIN — MUPIROCIN 1 APPLICATION: 20 OINTMENT TOPICAL at 14:11

## 2025-03-11 RX ADMIN — ONDANSETRON 4 MG: 2 INJECTION, SOLUTION INTRAMUSCULAR; INTRAVENOUS at 07:59

## 2025-03-11 RX ADMIN — PIPERACILLIN SODIUM AND TAZOBACTAM SODIUM 4.5 G: 4; .5 INJECTION, POWDER, LYOPHILIZED, FOR SOLUTION INTRAVENOUS at 20:25

## 2025-03-11 RX ADMIN — SENNOSIDES AND DOCUSATE SODIUM 2 TABLET: 50; 8.6 TABLET ORAL at 14:13

## 2025-03-11 RX ADMIN — NOREPINEPHRINE BITARTRATE 0.02 MCG/KG/MIN: 0.03 INJECTION, SOLUTION INTRAVENOUS at 09:33

## 2025-03-11 RX ADMIN — Medication 10 ML: at 20:21

## 2025-03-11 RX ADMIN — MUPIROCIN 1 APPLICATION: 20 OINTMENT TOPICAL at 20:21

## 2025-03-11 RX ADMIN — Medication 10 ML: at 10:15

## 2025-03-11 RX ADMIN — PIPERACILLIN AND TAZOBACTAM 4.5 G: 4; .5 INJECTION, POWDER, FOR SOLUTION INTRAVENOUS at 14:11

## 2025-03-11 RX ADMIN — SODIUM CHLORIDE 1000 ML: 0.9 INJECTION, SOLUTION INTRAVENOUS at 10:15

## 2025-03-11 RX ADMIN — SODIUM CHLORIDE, SODIUM LACTATE, POTASSIUM CHLORIDE, AND CALCIUM CHLORIDE 1000 ML: .6; .31; .03; .02 INJECTION, SOLUTION INTRAVENOUS at 10:15

## 2025-03-11 RX ADMIN — SODIUM CHLORIDE 75 ML/HR: 900 INJECTION, SOLUTION INTRAVENOUS at 07:50

## 2025-03-11 RX ADMIN — NOREPINEPHRINE BITARTRATE 0.3 MCG/KG/MIN: 0.03 INJECTION, SOLUTION INTRAVENOUS at 16:03

## 2025-03-11 RX ADMIN — LIDOCAINE HYDROCHLORIDE: 20 JELLY TOPICAL at 00:56

## 2025-03-11 RX ADMIN — BUDESONIDE 1 MG: 1 INHALANT ORAL at 20:08

## 2025-03-11 RX ADMIN — ROSUVASTATIN CALCIUM 40 MG: 20 TABLET, FILM COATED ORAL at 20:21

## 2025-03-11 RX ADMIN — SENNOSIDES AND DOCUSATE SODIUM 2 TABLET: 50; 8.6 TABLET ORAL at 20:21

## 2025-03-11 RX ADMIN — LIDOCAINE HYDROCHLORIDE 6 ML: 20 JELLY TOPICAL at 01:27

## 2025-03-11 RX ADMIN — LEVOTHYROXINE SODIUM 125 MCG: 125 TABLET ORAL at 14:11

## 2025-03-11 NOTE — ED NOTES
..Nursing report ED to floor  Vijay Foote  84 y.o.  male    HPI :  HPI  Stated Reason for Visit: olga pain, urinating blood x few days; on plavix  History Obtained From: patient    Chief Complaint  Chief Complaint   Patient presents with    Abdominal Pain    Blood in Urine       Admitting doctor:   Rosita Garcia MD    Admitting diagnosis:   The primary encounter diagnosis was Hematuria, unspecified type. Diagnoses of Urinary retention, Tachycardia, Elevated lactic acid level, Malignant neoplasm of right kidney, Abnormal CT of the abdomen, and Abdominal aortic aneurysm (AAA) without rupture, unspecified part were also pertinent to this visit.    Code status:   Current Code Status       Date Active Code Status Order ID Comments User Context       Prior            Allergies:   Patient has no known allergies.    Isolation:   No active isolations    Intake and Output    Intake/Output Summary (Last 24 hours) at 3/10/2025 2253  Last data filed at 3/10/2025 2100  Gross per 24 hour   Intake --   Output 1700 ml   Net -1700 ml       Weight:       03/10/25  1735   Weight: 68 kg (150 lb)       Most recent vitals:   Vitals:    03/10/25 1813 03/10/25 1901 03/10/25 2201 03/10/25 2231   BP: 112/83 129/88 (!) 89/67 99/65   Pulse: 86 80 83 82   Resp:       Temp:       TempSrc:       SpO2: 92% 100% 100% 100%   Weight:       Height:           Active LDAs/IV Access:   Lines, Drains & Airways       Active LDAs       Name Placement date Placement time Site Days    Peripheral IV 08/11/22 0528 Left Antecubital 08/11/22  0528  Antecubital  942    Peripheral IV 08/12/22 1030 Left;Posterior Hand 08/12/22  1030  Hand  941    Urethral Catheter Coude 22 Fr. 03/10/25  2003  -- less than 1                    Labs (abnormal labs have a star):   Labs Reviewed   COMPREHENSIVE METABOLIC PANEL - Abnormal; Notable for the following components:       Result Value    Glucose 131 (*)     BUN 27 (*)     Chloride 96 (*)     Calcium 10.9 (*)     eGFR 58.6  (*)     All other components within normal limits    Narrative:     GFR Categories in Chronic Kidney Disease (CKD)      GFR Category          GFR (mL/min/1.73)    Interpretation  G1                     90 or greater         Normal or high (1)  G2                      60-89                Mild decrease (1)  G3a                   45-59                Mild to moderate decrease  G3b                   30-44                Moderate to severe decrease  G4                    15-29                Severe decrease  G5                    14 or less           Kidney failure          (1)In the absence of evidence of kidney disease, neither GFR category G1 or G2 fulfill the criteria for CKD.    eGFR calculation 2021 CKD-EPI creatinine equation, which does not include race as a factor   LIPASE - Abnormal; Notable for the following components:    Lipase 66 (*)     All other components within normal limits   LACTIC ACID, PLASMA - Abnormal; Notable for the following components:    Lactate 3.4 (*)     All other components within normal limits   CBC WITH AUTO DIFFERENTIAL - Abnormal; Notable for the following components:    Hemoglobin 11.2 (*)     Hematocrit 36.9 (*)     MCH 24.9 (*)     MCHC 30.4 (*)     Neutrophil % 81.3 (*)     Lymphocyte % 7.5 (*)     Eosinophil % 0.0 (*)     Neutrophils, Absolute 7.18 (*)     Lymphocytes, Absolute 0.66 (*)     Monocytes, Absolute 0.94 (*)     All other components within normal limits   LACTIC ACID, REFLEX - Normal   BLOOD CULTURE   BLOOD CULTURE   RAINBOW DRAW    Narrative:     The following orders were created for panel order [place] Draw.  Procedure                               Abnormality         Status                     ---------                               -----------         ------                     Green Top (Gel)[[id number]]                                  Final result               Lavender Top[[id number]]                                     Final result               Gold Top -  SST[[id number]]                                   Final result               Light Blue Top[[id number]]                                   Final result                 Please view results for these tests on the individual orders.   URINALYSIS W/ MICROSCOPIC IF INDICATED (NO CULTURE)   PROCALCITONIN   TYPE AND SCREEN   CBC AND DIFFERENTIAL    Narrative:     The following orders were created for panel order CBC & Differential.  Procedure                               Abnormality         Status                     ---------                               -----------         ------                     CBC Auto Differential[[id number]]        Abnormal            Final result                 Please view results for these tests on the individual orders.   GREEN TOP   LAVENDER TOP   GOLD TOP - SST   LIGHT BLUE TOP       EKG:   No orders to display       Meds given in ED:   Medications   sodium chloride 0.9 % flush 10 mL (has no administration in time range)   sodium chloride 0.9 % flush 10 mL (has no administration in time range)   morphine injection 4 mg (has no administration in time range)   ondansetron (ZOFRAN) injection 8 mg (has no administration in time range)   cefTRIAXone (ROCEPHIN) 2,000 mg in sodium chloride 0.9 % 100 mL MBP (has no administration in time range)       Imaging results:  CT Abdomen Pelvis Without Contrast  Result Date: 3/10/2025   Large right renal mass without hydronephrosis no dense material in the right renal collecting system, ureter and urinary bladder likely represent hematuria.  Multiple left renal calculi without hydronephrosis or ureterolithiasis.  Area of hyperdensity within the inferior vena cava adjacent to the renal midpole question caval tumor thrombus.  3.6 cm abdominal aortic aneurysm.  Extensive sigmoid diverticulosis without evidence of diverticulitis.     This report was finalized on 3/10/2025 10:03 PM by Dr. Vijay Stinson M.D on Workstation: [id number]        Ambulatory status:    - assist x1    Social issues:   Social History     Socioeconomic History    Marital status:    Tobacco Use    Smoking status: Former     Current packs/day: 0.00     Types: Cigarettes     Start date: 1960     Quit date: 2010     Years since quitting: 15.1   Substance and Sexual Activity    Alcohol use: Yes     Comment: SOCIAL    Drug use: Not Currently       Peripheral Neurovascular  Peripheral Neurovascular (Adult)  Peripheral Neurovascular WDL: WDL    Neuro Cognitive  Neuro Cognitive (Adult)  Cognitive/Neuro/Behavioral WDL: WDL    Learning  Learning Assessment  Learning Readiness and Ability: no barriers identified  Education Provided  Person Taught: patient, spouse  Teaching Method: verbal instruction    Respiratory  Respiratory WDL  Respiratory WDL: WDL    Abdominal Pain       Pain Assessments  Pain (Adult)  (0-10) Pain Rating: Rest: 10    NIH Stroke Scale       Nadya Samuels RN  03/10/25 22:53 EDT

## 2025-03-11 NOTE — PROGRESS NOTES
Urology Progress Note:    -Checked on patient again this PM -- transferred to ICU  - Received 3upRBC an 1PLT at this time  -  CBI looking light pink on moderate drip  - Continue CBI  - No surgical intervention at this time  -Continue to hold plavix  - I do believe his bleeding is coming from his kidney  - If patient does not improve with holding plavix -- would consider TXA, angioembolization of kidney, or possibly radiation.  -Urology will follow     Armen Mendes MD

## 2025-03-11 NOTE — H&P
Patient Name:  Vijay Foote  YOB: 1940  MRN:  [id number]  Admit Date:  3/10/2025  Patient Care Team:  German Mccray MD as PCP - General (Internal Medicine)  Alicia Pickens MD as Consulting Physician (Interventional Cardiology)  Armani Siddiqui MD as Consulting Physician (Pulmonary Disease)      Subjective   History Present Illness     Chief Complaint   Patient presents with    Abdominal Pain    Blood in Urine       Mr. Foote is a 84 y.o. male with a history of CHF, renal cancer and recent watchman procedure, chronic home oxygen and COPD that presented last evening to [facility] complaining of difficulty urinating for several days.  He had some lower abdominal pain.  He was found to have a lactic acid of 3.4, normal white count, hemoglobin 11.2. 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 He underwent placement of three-way Ayala with irrigation, was started on IV Rocephin and admitted to the hospital for further evaluation and treatment.     I received a message from the nurse reporting BP 82/47, HR 97 and patient asymptomatic without complaints. Orders were given for bolus 500 ml NS and lactic acid. I entered the room noting rapid response team present and orders received from Dr. Khan to move the patient to intensive care. Nursing reported his BP dropped lower, he vomited and became pale and sweaty. Patient alert and appropriate, interactive. In no distress but appears ill and pale. His wife at bedside denied any change in his facial skin color. He denied SOA, CP, palpitations, abd pain. Nrusing reports CBI was going wide open and the bag changed frequently with urine output bright red.     [facility]. Had planned for surgery for the renal mass this month and the procedure was rescheduled for April due to he had watchman procedure and remained on aspirin and plavix. His cardiologist is Dr. Mccray with  [facility].     Review of Systems   Respiratory:  Negative for cough and shortness of breath.    Cardiovascular:  Negative for chest pain and palpitations.   Neurological:  Negative for dizziness and light-headedness.        Personal History     Past Medical History:   Diagnosis Date    Asthma     Cardiomyopathy     COPD (chronic obstructive pulmonary disease)     Coronary artery disease     Disease of thyroid gland     Hyperlipidemia     Hypertension      Past Surgical History:   Procedure Laterality Date    CARDIAC CATHETERIZATION      MASTOIDECTOMY Right     ORIF ANKLE FRACTURE Right     ROTATOR CUFF REPAIR Left     TONSILLECTOMY      TOTAL SHOULDER ARTHROPLASTY W/ DISTAL CLAVICLE EXCISION Right 8/11/2022    Procedure: RIGHT REVERSE TOTAL SHOULDER ARTHROPLASTY;  Surgeon: Wali Orellana MD;  Location: [facility] [place];  Service: Orthopedics;  Laterality: Right;    TRIGGER FINGER RELEASE Left      Family History   Problem Relation Age of Onset    Malig Hyperthermia Neg Hx      Social History     Tobacco Use    Smoking status: Former     Current packs/day: 0.00     Types: Cigarettes     Start date: 1960     Quit date: 2010     Years since quitting: 15.2   Vaping Use    Vaping status: Never Used   Substance Use Topics    Alcohol use: Yes     Comment: SOCIAL    Drug use: Not Currently     No current facility-administered medications on file prior to encounter.     Current Outpatient Medications on File Prior to Encounter   Medication Sig Dispense Refill    albuterol (PROVENTIL) (2.5 MG/3ML) 0.083% nebulizer solution Take 2.5 mg by nebulization Every 4 (Four) Hours As Needed for Wheezing.      albuterol sulfate  (90 Base) MCG/ACT inhaler Inhale 2 puffs Every 4 (Four) Hours As Needed for Wheezing.      aspirin 81 MG chewable tablet Chew 1 tablet Daily. 21 tablet 0    Benralizumab (Fasenra Pen) 30 MG/ML solution auto-injector Inject  under the skin into the appropriate area as directed. INJECTION EVERY 8 WEEKS       budesonide (PULMICORT) 1 MG/2ML nebulizer solution Take 2 mL by nebulization 2 (Two) Times a Day.      furosemide (LASIX) 40 MG tablet Take 1 tablet by mouth Daily.      ipratropium (ATROVENT) 0.02 % nebulizer solution Take 2.5 mL by nebulization 4 (Four) Times a Day.      levothyroxine (SYNTHROID, LEVOTHROID) 125 MCG tablet Take 1 tablet by mouth Daily.      lisinopril (PRINIVIL,ZESTRIL) 2.5 MG tablet Take 1 tablet by mouth Daily.      metoprolol tartrate (LOPRESSOR) 25 MG tablet Take 1 tablet by mouth 2 (Two) Times a Day.      rosuvastatin (CRESTOR) 40 MG tablet Take 1 tablet by mouth Every Night.      Chlorhexidine Gluconate Cloth 2 % pads Apply  topically.      clopidogrel (PLAVIX) 75 MG tablet Take 1 tablet by mouth Daily.      mupirocin (BACTROBAN) 2 % nasal ointment into the nostril(s) as directed by provider 2 (Two) Times a Day.       No Known Allergies    Objective    Objective     Vital Signs  Temp:  [96.9 °F (36.1 °C)-97.7 °F (36.5 °C)] 97.5 °F (36.4 °C)  Heart Rate:  [] 108  Resp:  [18-23] 22  BP: ()/(33-88) 86/47  SpO2:  [41 %-100 %] 92 %  on  Flow (L/min) (Oxygen Therapy):  [3] 3;   Device (Oxygen Therapy): nasal cannula  Body mass index is 23.49 kg/m².    Physical Exam  Constitutional:       Appearance: He is ill-appearing.   HENT:      Head: Normocephalic and atraumatic.      Mouth/Throat:      Mouth: Mucous membranes are moist.   Eyes:      Extraocular Movements: Extraocular movements intact.   Cardiovascular:      Rate and Rhythm: Normal rate and regular rhythm.      Pulses: Normal pulses.      Heart sounds: Normal heart sounds.   Pulmonary:      Effort: Pulmonary effort is normal.      Breath sounds: Normal breath sounds.   Abdominal:      General: Bowel sounds are normal. There is no distension.      Palpations: Abdomen is soft.      Tenderness: There is no abdominal tenderness.   Skin:     General: Skin is warm and dry.      Coloration: Skin is pale.   Neurological:      Mental  Status: He is alert and oriented to person, place, and time.      Comments: Clear speech         Results Review:  I reviewed the patient's new clinical results.      Lab Results (last 24 hours)       Procedure Component Value Units Date/Time    CBC & Differential [[id number]]  (Abnormal) Collected: 03/10/25 1743    Specimen: Blood from Arm, Right Updated: 03/10/25 1802    Narrative:      The following orders were created for panel order CBC & Differential.  Procedure                               Abnormality         Status                     ---------                               -----------         ------                     CBC Auto Differential[[id number]]        Abnormal            Final result                 Please view results for these tests on the individual orders.    Comprehensive Metabolic Panel [[id number]]  (Abnormal) Collected: 03/10/25 1743    Specimen: Blood from Arm, Right Updated: 03/10/25 1826     Glucose 131 mg/dL      BUN 27 mg/dL      Creatinine 1.23 mg/dL      Sodium 136 mmol/L      Potassium 4.9 mmol/L      Chloride 96 mmol/L      CO2 26.9 mmol/L      Calcium 10.9 mg/dL      Total Protein 7.8 g/dL      Albumin 3.7 g/dL      ALT (SGPT) 14 U/L      AST (SGOT) 21 U/L      Alkaline Phosphatase 63 U/L      Total Bilirubin 0.7 mg/dL      Globulin 4.1 gm/dL      A/G Ratio 0.9 g/dL      BUN/Creatinine Ratio 22.0     Anion Gap 13.1 mmol/L      eGFR 58.6 mL/min/1.73     Narrative:      GFR Categories in Chronic Kidney Disease (CKD)      GFR Category          GFR (mL/min/1.73)    Interpretation  G1                     90 or greater         Normal or high (1)  G2                      60-89                Mild decrease (1)  G3a                   45-59                Mild to moderate decrease  G3b                   30-44                Moderate to severe decrease  G4                    15-29                Severe decrease  G5                    14 or less           Kidney failure          (1)In the absence  of evidence of kidney disease, neither GFR category G1 or G2 fulfill the criteria for CKD.    eGFR calculation 2021 CKD-EPI creatinine equation, which does not include race as a factor    Lipase [[id number]]  (Abnormal) Collected: 03/10/25 1743    Specimen: Blood from Arm, Right Updated: 03/10/25 1826     Lipase 66 U/L     Lactic Acid, Plasma [[id number]]  (Abnormal) Collected: 03/10/25 1743    Specimen: Blood from Arm, Right Updated: 03/10/25 1823     Lactate 3.4 mmol/L     CBC Auto Differential [[id number]]  (Abnormal) Collected: 03/10/25 1743    Specimen: Blood from Arm, Right Updated: 03/10/25 1802     WBC 8.83 10*3/mm3      RBC 4.50 10*6/mm3      Hemoglobin 11.2 g/dL      Hematocrit 36.9 %      MCV 82.0 fL      MCH 24.9 pg      MCHC 30.4 g/dL      RDW 13.5 %      RDW-SD 39.5 fl      MPV 9.3 fL      Platelets 260 10*3/mm3      Neutrophil % 81.3 %      Lymphocyte % 7.5 %      Monocyte % 10.6 %      Eosinophil % 0.0 %      Basophil % 0.1 %      Immature Grans % 0.5 %      Neutrophils, Absolute 7.18 10*3/mm3      Lymphocytes, Absolute 0.66 10*3/mm3      Monocytes, Absolute 0.94 10*3/mm3      Eosinophils, Absolute 0.00 10*3/mm3      Basophils, Absolute 0.01 10*3/mm3      Immature Grans, Absolute 0.04 10*3/mm3      nRBC 0.0 /100 WBC     Blood Culture - Blood, Arm, Right [[id number]] Collected: 03/10/25 1743    Specimen: Blood from Arm, Right Updated: 03/10/25 1755    Blood Culture - Blood, Arm, Left [[id number]] Collected: 03/10/25 1748    Specimen: Blood from Arm, Left Updated: 03/10/25 1756    Urinalysis With Microscopic If Indicated (No Culture) - Indwelling Urethral Catheter [[id number]] Updated: 03/10/25 2304    Specimen: Urine from Indwelling Urethral Catheter     STAT Lactic Acid, Reflex [[id number]]  (Normal) Collected: 03/10/25 2205    Specimen: Blood Updated: 03/10/25 2237     Lactate 1.6 mmol/L     Procalcitonin [[id number]]  (Normal) Collected: 03/10/25 2306    Specimen: Blood Updated: 03/11/25 0005      "Procalcitonin 0.18 ng/mL     Narrative:      As a Marker for Sepsis (Non-Neonates):    1. <0.5 ng/mL represents a low risk of severe sepsis and/or septic shock.  2. >2 ng/mL represents a high risk of severe sepsis and/or septic shock.    As a Marker for Lower Respiratory Tract Infections that require antibiotic therapy:    PCT on Admission    Antibiotic Therapy       6-12 Hrs later    >0.5                Strongly Recommended  >0.25 - <0.5        Recommended   0.1 - 0.25          Discouraged              Remeasure/reassess PCT  <0.1                Strongly Discouraged     Remeasure/reassess PCT    As 28 day mortality risk marker: \"Change in Procalcitonin Result\" (>80% or <=80%) if Day 0 (or Day 1) and Day 4 values are available. Refer to http://www.[facility] [place]    Change in PCT <=80%  A decrease of PCT levels below or equal to 80% defines a positive change in PCT test result representing a higher risk for 28-day all-cause mortality of patients diagnosed with severe sepsis for septic shock.    Change in PCT >80%  A decrease of PCT levels of more than 80% defines a negative change in PCT result representing a lower risk for 28-day all-cause mortality of patients diagnosed with severe sepsis or septic shock.       Hemoglobin & Hematocrit, Blood [[id number]]  (Abnormal) Collected: 03/11/25 0059    Specimen: Blood Updated: 03/11/25 0116     Hemoglobin 9.7 g/dL      Hematocrit 31.6 %     Basic Metabolic Panel [[id number]]  (Abnormal) Collected: 03/11/25 0617    Specimen: Blood Updated: 03/11/25 0707     Glucose 112 mg/dL      BUN 25 mg/dL      Creatinine 1.05 mg/dL      Sodium 138 mmol/L      Potassium 4.5 mmol/L      Chloride 102 mmol/L      CO2 25.3 mmol/L      Calcium 9.0 mg/dL      BUN/Creatinine Ratio 23.8     Anion Gap 10.7 mmol/L      eGFR 70.9 mL/min/1.73     Narrative:      GFR Categories in Chronic Kidney Disease (CKD)      GFR Category          GFR (mL/min/1.73)    Interpretation  G1                   "   90 or greater         Normal or high (1)  G2                      60-89                Mild decrease (1)  G3a                   45-59                Mild to moderate decrease  G3b                   30-44                Moderate to severe decrease  G4                    15-29                Severe decrease  G5                    14 or less           Kidney failure          (1)In the absence of evidence of kidney disease, neither GFR category G1 or G2 fulfill the criteria for CKD.    eGFR calculation 2021 CKD-EPI creatinine equation, which does not include race as a factor    CBC (No Diff) [[id number]]  (Abnormal) Collected: 03/11/25 0617    Specimen: Blood Updated: 03/11/25 0656     WBC 10.22 10*3/mm3      RBC 3.46 10*6/mm3      Hemoglobin 8.9 g/dL      Hematocrit 28.6 %      MCV 82.7 fL      MCH 25.7 pg      MCHC 31.1 g/dL      RDW 14.0 %      RDW-SD 41.8 fl      MPV 9.4 fL      Platelets 233 10*3/mm3     Lactic Acid, Plasma [[id number]]  (Normal) Collected: 03/11/25 0735    Specimen: Blood Updated: 03/11/25 0807     Lactate 1.2 mmol/L     POC Glucose Once [[id number]]  (Abnormal) Collected: 03/11/25 0850    Specimen: Blood Updated: 03/11/25 0851     Glucose 142 mg/dL     CBC & Differential [[id number]] Collected: 03/11/25 1214    Specimen: Blood Updated: 03/11/25 1224    Narrative:      The following orders were created for panel order CBC & Differential.  Procedure                               Abnormality         Status                     ---------                               -----------         ------                     CBC Auto Differential[[id number]]                            In process                   Please view results for these tests on the individual orders.    CBC Auto Differential [[id number]] Collected: 03/11/25 1214    Specimen: Blood Updated: 03/11/25 1224    Basic Metabolic Panel [[id number]] Collected: 03/11/25 1214    Specimen: Blood Updated: 03/11/25 1224    Magnesium [[id number]] Collected:  03/11/25 1214    Specimen: Blood Updated: 03/11/25 1224    Lactic Acid, Plasma [[id number]] Collected: 03/11/25 1214    Specimen: Blood Updated: 03/11/25 1224    Manual Differential [[id number]] Collected: 03/11/25 1214    Specimen: Blood Updated: 03/11/25 1231            Imaging Results (Last 24 Hours)       Procedure Component Value Units Date/Time    XR Chest Post CVA Port [[id number]] Collected: 03/11/25 1027     Updated: 03/11/25 1033    Narrative:      XR CHEST POST CVA PORT-     INDICATIONS: Central line placement     TECHNIQUE: FRONTAL VIEWS OF THE CHEST     COMPARISON: 8/2/2022     FINDINGS:     Right IJ catheter extends to the superior vena cava. Left-sided  pacemaker and cardiac leads are seen. The heart size is normal.  Pulmonary vasculature is unremarkable. No focal pulmonary consolidation,  pleural effusion, or pneumothorax. Old granulomatous disease is  apparent. No acute osseous process.       Impression:         Central line placement. No pneumothorax.           This report was finalized on 3/11/2025 10:30 AM by Dr. Hayden Cooney M.D on Workstation: [id number]       CT Abdomen Pelvis Without Contrast [[id number]] Collected: 03/10/25 2156     Updated: 03/10/25 2206    Narrative:      CT ABDOMEN AND PELVIS WITHOUT CONTRAST:     HISTORY: Lower abdominal pain with hematuria and difficulty urinating,  history of kidney stones, history of renal neoplasm     TECHNIQUE: Helical CT was performed of the abdomen and pelvis from the  lung bases through the lesser trochanters without IV contrast.  Reformatted images were provided in the coronal and sagittal planes.  Radiation dose reduction techniques were utilized, including automated  exposure control, and exposure modulation based on body size.     COMPARISON: None available.     FINDINGS:     Lung bases: No acute findings, elevation of the right hemidiaphragm.     Abdomen: The liver is unremarkable. There is cholelithiasis, but no CT  evidence of  cholecystitis or biliary obstruction. The spleen and  pancreas appear normal. No evidence of acute abnormality in either  adrenal gland. Left kidney demonstrates multiple calculi, including an  up to 2.3 cm developing staghorn calculus. There is no hydronephrosis or  ureterolithiasis. There is a very large right renal lower pole  solid-appearing mass, about 9 cm in overall size. Hyperdense material in  the right renal collecting system and ureter may represent blood.     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 There is no suspicious  retroperitoneal adenopathy.      There is no evidence of bowel obstruction. There is an up to 3.6 cm  abdominal aortic aneurysm.     Pelvis: There is a Ayala catheter in place and there is prostatic  hypertrophy, but there appears to be blood within the urinary bladder as  well. There is no pelvic inflammatory change or other abnormal fluid  collection. There is extensive sigmoid diverticulosis but no CT evidence  of diverticulitis. There is no CT evidence of hernia or bowel  obstruction.     There are spinal degenerative changes, but there is no acute bony  abnormality.       Impression:         Large right renal mass without hydronephrosis no dense material in the  right renal collecting system, ureter and urinary bladder likely  represent hematuria.     Multiple left renal calculi without hydronephrosis or ureterolithiasis.     Area of hyperdensity within the inferior vena cava adjacent to the renal  midpole question caval tumor thrombus.     3.6 cm abdominal aortic aneurysm.     Extensive sigmoid diverticulosis without evidence of diverticulitis.              This report was finalized on 3/10/2025 10:03 PM by Dr. Vijay Stinson M.D on Workstation: [id number]                   No orders to display        Assessment/Plan     Active Hospital Problems    Diagnosis  POA    **Hematuria [R31.9]  Yes     Renal mass [N28.89]  Unknown    Presence of Watchman left atrial appendage closure device [Z95.818]  Unknown    CHF (congestive heart failure) [I50.9]  Yes    Hypertension [I10]  Yes    Coronary artery disease [I25.10]  Yes    COPD (chronic obstructive pulmonary disease) [J44.9]  Yes      Resolved Hospital Problems   No resolved problems to display.     Patient actively being transported to CCU by staff. Intensivist to manage. Cardiology and Urology consults.       Code Status - Full code.       CECI Wyatt  [facility]  03/11/25  12:43 EDT

## 2025-03-11 NOTE — PROGRESS NOTES
[facility] Clinical Pharmacy Services: Piperacillin-Tazobactam Consult    Pt Name: Vijay Foote   : 1940    Indication: Empiric    Relevant clinical data and objective history reviewed:    Past Medical History:   Diagnosis Date    Asthma     Cardiomyopathy     COPD (chronic obstructive pulmonary disease)     Coronary artery disease     Disease of thyroid gland     Hyperlipidemia     Hypertension      Creatinine   Date Value Ref Range Status   2025 1.05 0.76 - 1.27 mg/dL Final   03/10/2025 1.23 0.76 - 1.27 mg/dL Final   2022 0.83 0.76 - 1.27 mg/dL Final   2022 0.97 0.73 - 1.18 mg/dL Final   2021 0.8 0.7 - 1.5 mg/dL Final   2020 0.8 0.7 - 1.5 mg/dL Final     BUN   Date Value Ref Range Status   2025 25 (H) 8 - 23 mg/dL Final   2022 23 8 - 26 mg/dL Final     Estimated Creatinine Clearance: 52.2 mL/min (by C-G formula based on SCr of 1.05 mg/dL).    Lab Results   Component Value Date    WBC 10.22 2025     Temp Readings from Last 3 Encounters:   25 97.5 °F (36.4 °C) (Oral)   22 97 °F (36.1 °C) (Oral)   22 97.9 °F (36.6 °C) (Oral)      Assessment/Plan  Estimated CrCl >20 mL/min at this time; BMI 23.5 kg/m2  Will start piperacillin-tazobactam 4.5 g IV every 8 hours for shock state on vasopressors   Discontinued Consult placeholder but will continue to follow for adjustments based on SCr/CrCl.     Thank you for this consult.    Manoj Lobo, PharmD  Clinical Pharmacist

## 2025-03-11 NOTE — CASE MANAGEMENT/SOCIAL WORK
Discharge Planning Assessment  [facility]     Patient Name: Vijay Foote  MRN: [id number]  Today's Date: 3/11/2025    Admit Date: 3/10/2025    Plan: pending progress.   Discharge Needs Assessment       Row Name 03/11/25 1554       Living Environment    People in Home spouse    Current Living Arrangements home    Potentially Unsafe Housing Conditions none    In the past 12 months has the electric, gas, oil, or water company threatened to shut off services in your home? No    Primary Care Provided by self    Provides Primary Care For no one    Family Caregiver if Needed spouse    Quality of Family Relationships helpful    Able to Return to Prior Arrangements yes       Resource/Environmental Concerns    Resource/Environmental Concerns none    Transportation Concerns none       Transportation Needs    In the past 12 months, has lack of transportation kept you from medical appointments or from getting medications? no    In the past 12 months, has lack of transportation kept you from meetings, work, or from getting things needed for daily living? No       Food Insecurity    Within the past 12 months, you worried that your food would run out before you got the money to buy more. Never true       Transition Planning    Patient/Family Anticipates Transition to home with family    Patient/Family Anticipated Services at Transition none    Transportation Anticipated family or friend will provide;health plan transportation       Discharge Needs Assessment    Readmission Within the Last 30 Days no previous admission in last 30 days    Equipment Currently Used at Home oxygen;nebulizer;shower chair;bp cuff;pulse ox  Wears continuous home o2 at 3l N/c from [employer]    Concerns to be Addressed discharge planning    Anticipated Changes Related to Illness inability to care for self    Equipment Needed After Discharge other (see comments)  TBD    Discharge Facility/Level of Care Needs other (see comments)  TBD    Provided Post  "Acute Provider List? Yes    Post Acute Provider List DME Supplier;Home Health;Inpatient Rehab;Nursing Home    Provided Post Acute Provider Quality & Resource List? Yes    Post Acute Provider Quality and Resource List Home Health;Inpatient Rehab;Nursing Home    Delivered To Patient    Method of Delivery In person    Current Discharge Risk chronically ill                   Discharge Plan       Row Name 03/11/25 7001       Plan    Plan pending progress.    Roadmap to Recovery Yes    Patient/Family in Agreement with Plan yes    Plan Comments Spoke with patient and spouse at bedside. Facesheet, PCP and pharmacy verified. patient states he is IADL with continuous home o2 at 3/n/c from [facility]. he uses a b/p cuff. pulse ox, shower chair and nebulizer. He denies home health at this time. He states he is current with pulmonary rehab program at [facility]. Barriers to dc: levo, PRBC, CBI, Platelets, central line. DC plan is pending clinical progress. Patient c/o \"increasing weakness over the past few weeks.\"                       Demographic Summary       Row Name 03/11/25 9960       General Information    Admission Type inpatient    Arrived From emergency department    Required Notices Provided Important Message from Medicare    Referral Source admission list    Reason for Consult discharge planning    Preferred Language English                   Functional Status       Row Name 03/11/25 6011       Functional Status    Usual Activity Tolerance good    Current Activity Tolerance fair       Functional Status, IADL    Medications independent    Meal Preparation independent    Housekeeping independent    Laundry independent    Shopping independent    IADL Comments Spouse assists as needed.       Mental Status    General Appearance WDL WDL       Mental Status Summary    Recent Changes in Mental Status/Cognitive Functioning no changes                               Lashanda Dominguez RN    "

## 2025-03-11 NOTE — CONSULTS
[facility] UROLOGY CONSULT    Patient Identification:  NAME:  Vijay Foote  Age:  84 y.o.   Sex:  male   :  1940   MRN:  [id number]     Chief complaint: gross hematuria    History of present illness:  Vijay Foote is a 84 y.o. male  with hx of very large right renal mass concerning for possible urothelial cell carcinoma and left renal stones presenting for gross hematuria. He has a hx CAD, CHF. Afib s/p recent WATCHMAN procedure 25. He remains on plavix per cardiologist. He is followed by Dr. Clemente of [facility] urology who plans right nephroureterectomy for renal mass on 25 as well as cystoscopy and bladder biopsy on 25.     In ED 3 way rocha was placed and significant clot burden was evacuated. CBI is running at brisk rate on my evaluation    Lab Results   Component Value Date    CREATININE 1.05 2025    WBC 10.22 2025    HGB 8.9 (L) 2025      No results found for this or any previous visit.      CT AP 3/10/25 (viewed and interpreted by me) - large right renal mass; left sided nonobstructing stones; rocha in bladder, small volume of clot remains     Past medical history:  Past Medical History:   Diagnosis Date    Asthma     Cardiomyopathy     COPD (chronic obstructive pulmonary disease)     Coronary artery disease     Disease of thyroid gland     Hyperlipidemia     Hypertension        Past surgical history:  Past Surgical History:   Procedure Laterality Date    CARDIAC CATHETERIZATION      MASTOIDECTOMY Right     ORIF ANKLE FRACTURE Right     ROTATOR CUFF REPAIR Left     TONSILLECTOMY      TOTAL SHOULDER ARTHROPLASTY W/ DISTAL CLAVICLE EXCISION Right 2022    Procedure: RIGHT REVERSE TOTAL SHOULDER ARTHROPLASTY;  Surgeon: Wali Orellana MD;  Location: [facility] OR [facility];  Service: Orthopedics;  Laterality: Right;    TRIGGER FINGER RELEASE Left        Allergies:  Patient has no known allergies.    Home medications:  Medications Prior to Admission   Medication Sig Dispense Refill  Last Dose/Taking    albuterol (PROVENTIL) (2.5 MG/3ML) 0.083% nebulizer solution Take 2.5 mg by nebulization Every 4 (Four) Hours As Needed for Wheezing.   3/10/2025    albuterol sulfate  (90 Base) MCG/ACT inhaler Inhale 2 puffs Every 4 (Four) Hours As Needed for Wheezing.   3/10/2025    aspirin 81 MG chewable tablet Chew 1 tablet Daily. 21 tablet 0 3/10/2025    Benralizumab (Fasenra Pen) 30 MG/ML solution auto-injector Inject  under the skin into the appropriate area as directed. INJECTION EVERY 8 WEEKS   Past Month    budesonide (PULMICORT) 1 MG/2ML nebulizer solution Take 2 mL by nebulization 2 (Two) Times a Day.   3/10/2025    furosemide (LASIX) 40 MG tablet Take 1 tablet by mouth Daily.   3/10/2025    ipratropium (ATROVENT) 0.02 % nebulizer solution Take 2.5 mL by nebulization 4 (Four) Times a Day.   3/10/2025    levothyroxine (SYNTHROID, LEVOTHROID) 125 MCG tablet Take 1 tablet by mouth Daily.   3/10/2025    lisinopril (PRINIVIL,ZESTRIL) 2.5 MG tablet Take 1 tablet by mouth Daily.   3/10/2025    metoprolol tartrate (LOPRESSOR) 25 MG tablet Take 1 tablet by mouth 2 (Two) Times a Day.   3/10/2025    rosuvastatin (CRESTOR) 40 MG tablet Take 1 tablet by mouth Every Night.   3/10/2025    Chlorhexidine Gluconate Cloth 2 % pads Apply  topically.       clopidogrel (PLAVIX) 75 MG tablet Take 1 tablet by mouth Daily.       mupirocin (BACTROBAN) 2 % nasal ointment into the nostril(s) as directed by provider 2 (Two) Times a Day.           Hospital medications:  sodium chloride, 500 mL, Intravenous, Once      sodium chloride, 75 mL/hr, Last Rate: 75 mL/hr (03/11/25 0750)        acetaminophen    senna-docusate sodium **AND** polyethylene glycol **AND** bisacodyl **AND** bisacodyl    Morphine    nitroglycerin    ondansetron ODT **OR** ondansetron    ondansetron    sodium chloride    sodium chloride    Family history:  Family History   Problem Relation Age of Onset    Malig Hyperthermia Neg Hx        Social  history:  Social History     Tobacco Use    Smoking status: Former     Current packs/day: 0.00     Types: Cigarettes     Start date:      Quit date:      Years since quitting: 15.2   Vaping Use    Vaping status: Never Used   Substance Use Topics    Alcohol use: Yes     Comment: SOCIAL    Drug use: Not Currently       REVIEW OF SYSTEMS:  14 pt ROS performed and negative except per HPI    Objective:  TMax 24 hours:   Temp (24hrs), Av.2 °F (36.2 °C), Min:96.9 °F (36.1 °C), Max:97.5 °F (36.4 °C)      Vitals Ranges:   Temp:  [96.9 °F (36.1 °C)-97.5 °F (36.4 °C)] 97.3 °F (36.3 °C)  Heart Rate:  [] 84  Resp:  [18-22] 18  BP: ()/(40-88) 70/40    Intake/Output Last 3 shifts:  I/O last 3 completed shifts:  In: 600 [IV Piggyback:600]  Out: 2600 [Urine:2600]     Physical Exam:    : 3 way rocha is bright red on brisk drip    Results review:   I reviewed the patient's new clinical results.    Data review:  Lab Results (last 24 hours)       Procedure Component Value Units Date/Time    Lactic Acid, Plasma [[id number]]  (Normal) Collected: 25 0735    Specimen: Blood Updated: 25 0807     Lactate 1.2 mmol/L     Basic Metabolic Panel [[id number]]  (Abnormal) Collected: 25 0617    Specimen: Blood Updated: 25 0707     Glucose 112 mg/dL      BUN 25 mg/dL      Creatinine 1.05 mg/dL      Sodium 138 mmol/L      Potassium 4.5 mmol/L      Chloride 102 mmol/L      CO2 25.3 mmol/L      Calcium 9.0 mg/dL      BUN/Creatinine Ratio 23.8     Anion Gap 10.7 mmol/L      eGFR 70.9 mL/min/1.73     Narrative:      GFR Categories in Chronic Kidney Disease (CKD)      GFR Category          GFR (mL/min/1.73)    Interpretation  G1                     90 or greater         Normal or high (1)  G2                      60-89                Mild decrease (1)  G3a                   45-59                Mild to moderate decrease  G3b                   30-44                Moderate to severe decrease  G4                  "   15-29                Severe decrease  G5                    14 or less           Kidney failure          (1)In the absence of evidence of kidney disease, neither GFR category G1 or G2 fulfill the criteria for CKD.    eGFR calculation 2021 CKD-EPI creatinine equation, which does not include race as a factor    CBC (No Diff) [[id number]]  (Abnormal) Collected: 03/11/25 0617    Specimen: Blood Updated: 03/11/25 0656     WBC 10.22 10*3/mm3      RBC 3.46 10*6/mm3      Hemoglobin 8.9 g/dL      Hematocrit 28.6 %      MCV 82.7 fL      MCH 25.7 pg      MCHC 31.1 g/dL      RDW 14.0 %      RDW-SD 41.8 fl      MPV 9.4 fL      Platelets 233 10*3/mm3     Hemoglobin & Hematocrit, Blood [[id number]]  (Abnormal) Collected: 03/11/25 0059    Specimen: Blood Updated: 03/11/25 0116     Hemoglobin 9.7 g/dL      Hematocrit 31.6 %     Procalcitonin [[id number]]  (Normal) Collected: 03/10/25 2306    Specimen: Blood Updated: 03/11/25 0005     Procalcitonin 0.18 ng/mL     Narrative:      As a Marker for Sepsis (Non-Neonates):    1. <0.5 ng/mL represents a low risk of severe sepsis and/or septic shock.  2. >2 ng/mL represents a high risk of severe sepsis and/or septic shock.    As a Marker for Lower Respiratory Tract Infections that require antibiotic therapy:    PCT on Admission    Antibiotic Therapy       6-12 Hrs later    >0.5                Strongly Recommended  >0.25 - <0.5        Recommended   0.1 - 0.25          Discouraged              Remeasure/reassess PCT  <0.1                Strongly Discouraged     Remeasure/reassess PCT    As 28 day mortality risk marker: \"Change in Procalcitonin Result\" (>80% or <=80%) if Day 0 (or Day 1) and Day 4 values are available. Refer to http://www.[facility]-pct-calculator.com    Change in PCT <=80%  A decrease of PCT levels below or equal to 80% defines a positive change in PCT test result representing a higher risk for 28-day all-cause mortality of patients diagnosed with severe sepsis for septic " shock.    Change in PCT >80%  A decrease of PCT levels of more than 80% defines a negative change in PCT result representing a lower risk for 28-day all-cause mortality of patients diagnosed with severe sepsis or septic shock.       Urinalysis With Microscopic If Indicated (No Culture) - Indwelling Urethral Catheter [[id number]] Updated: 03/10/25 2304    Specimen: Urine from Indwelling Urethral Catheter     STAT Lactic Acid, Reflex [[id number]]  (Normal) Collected: 03/10/25 2205    Specimen: Blood Updated: 03/10/25 2237     Lactate 1.6 mmol/L     Comprehensive Metabolic Panel [[id number]]  (Abnormal) Collected: 03/10/25 1743    Specimen: Blood from Arm, Right Updated: 03/10/25 1826     Glucose 131 mg/dL      BUN 27 mg/dL      Creatinine 1.23 mg/dL      Sodium 136 mmol/L      Potassium 4.9 mmol/L      Chloride 96 mmol/L      CO2 26.9 mmol/L      Calcium 10.9 mg/dL      Total Protein 7.8 g/dL      Albumin 3.7 g/dL      ALT (SGPT) 14 U/L      AST (SGOT) 21 U/L      Alkaline Phosphatase 63 U/L      Total Bilirubin 0.7 mg/dL      Globulin 4.1 gm/dL      A/G Ratio 0.9 g/dL      BUN/Creatinine Ratio 22.0     Anion Gap 13.1 mmol/L      eGFR 58.6 mL/min/1.73     Narrative:      GFR Categories in Chronic Kidney Disease (CKD)      GFR Category          GFR (mL/min/1.73)    Interpretation  G1                     90 or greater         Normal or high (1)  G2                      60-89                Mild decrease (1)  G3a                   45-59                Mild to moderate decrease  G3b                   30-44                Moderate to severe decrease  G4                    15-29                Severe decrease  G5                    14 or less           Kidney failure          (1)In the absence of evidence of kidney disease, neither GFR category G1 or G2 fulfill the criteria for CKD.    eGFR calculation 2021 CKD-EPI creatinine equation, which does not include race as a factor    Lipase [[id number]]  (Abnormal) Collected:  03/10/25 1743    Specimen: Blood from Arm, Right Updated: 03/10/25 1826     Lipase 66 U/L     Lactic Acid, Plasma [[id number]]  (Abnormal) Collected: 03/10/25 1743    Specimen: Blood from Arm, Right Updated: 03/10/25 1823     Lactate 3.4 mmol/L     CBC & Differential [[id number]]  (Abnormal) Collected: 03/10/25 1743    Specimen: Blood from Arm, Right Updated: 03/10/25 1802    Narrative:      The following orders were created for panel order CBC & Differential.  Procedure                               Abnormality         Status                     ---------                               -----------         ------                     CBC Auto Differential[[id number]]        Abnormal            Final result                 Please view results for these tests on the individual orders.    CBC Auto Differential [[id number]]  (Abnormal) Collected: 03/10/25 1743    Specimen: Blood from Arm, Right Updated: 03/10/25 1802     WBC 8.83 10*3/mm3      RBC 4.50 10*6/mm3      Hemoglobin 11.2 g/dL      Hematocrit 36.9 %      MCV 82.0 fL      MCH 24.9 pg      MCHC 30.4 g/dL      RDW 13.5 %      RDW-SD 39.5 fl      MPV 9.3 fL      Platelets 260 10*3/mm3      Neutrophil % 81.3 %      Lymphocyte % 7.5 %      Monocyte % 10.6 %      Eosinophil % 0.0 %      Basophil % 0.1 %      Immature Grans % 0.5 %      Neutrophils, Absolute 7.18 10*3/mm3      Lymphocytes, Absolute 0.66 10*3/mm3      Monocytes, Absolute 0.94 10*3/mm3      Eosinophils, Absolute 0.00 10*3/mm3      Basophils, Absolute 0.01 10*3/mm3      Immature Grans, Absolute 0.04 10*3/mm3      nRBC 0.0 /100 WBC     Hiller Draw [[id number]] Collected: 03/10/25 1743    Specimen: Blood from Arm, Right Updated: 03/10/25 1800    Narrative:      The following orders were created for panel order Hiller Draw.  Procedure                               Abnormality         Status                     ---------                               -----------         ------                     Green [facility]  (Gel)[[id number]]                                  Final result               Lavender Top[[id number]]                                     Final result               Gold Top - SST[[id number]]                                   Final result               Light Blue Top[[id number]]                                   Final result                 Please view results for these tests on the individual orders.    Green Top (Gel) [[id number]] Collected: 03/10/25 1743    Specimen: Blood from Arm, Right Updated: 03/10/25 1800     Extra Tube Hold for add-ons.     Comment: Auto resulted.       Lavender Top [[id number]] Collected: 03/10/25 1743    Specimen: Blood from Arm, Right Updated: 03/10/25 1800     Extra Tube hold for add-on     Comment: Auto resulted       Gold Top - SST [[id number]] Collected: 03/10/25 1743    Specimen: Blood from Arm, Right Updated: 03/10/25 1800     Extra Tube Hold for add-ons.     Comment: Auto resulted.       Light Blue Top [[id number]] Collected: 03/10/25 1743    Specimen: Blood from Arm, Right Updated: 03/10/25 1800     Extra Tube Hold for add-ons.     Comment: Auto resulted       Blood Culture - Blood, Arm, Left [[id number]] Collected: 03/10/25 1748    Specimen: Blood from Arm, Left Updated: 03/10/25 1756    Blood Culture - Blood, Arm, Right [[id number]] Collected: 03/10/25 1743    Specimen: Blood from Arm, Right Updated: 03/10/25 1755             Imaging:  Imaging Results (Last 24 Hours)       Procedure Component Value Units Date/Time    CT Abdomen Pelvis Without Contrast [[id number]] Collected: 03/10/25 2156     Updated: 03/10/25 2206    Narrative:      CT ABDOMEN AND PELVIS WITHOUT CONTRAST:     HISTORY: Lower abdominal pain with hematuria and difficulty urinating,  history of kidney stones, history of renal neoplasm     TECHNIQUE: Helical CT was performed of the abdomen and pelvis from the  lung bases through the lesser trochanters without IV contrast.  Reformatted images were provided in the coronal and  sagittal planes.  Radiation dose reduction techniques were utilized, including automated  exposure control, and exposure modulation based on body size.     COMPARISON: None available.     FINDINGS:     Lung bases: No acute findings, elevation of the right hemidiaphragm.     Abdomen: The liver is unremarkable. There is cholelithiasis, but no CT  evidence of cholecystitis or biliary obstruction. The spleen and  pancreas appear normal. No evidence of acute abnormality in either  adrenal gland. Left kidney demonstrates multiple calculi, including an  up to 2.3 cm developing staghorn calculus. There is no hydronephrosis or  ureterolithiasis. There is a very large right renal lower pole  solid-appearing mass, about 9 cm in overall size. Hyperdense material in  the right renal collecting system and ureter may represent blood.     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 There is no suspicious  retroperitoneal adenopathy.      There is no evidence of bowel obstruction. There is an up to 3.6 cm  abdominal aortic aneurysm.     Pelvis: There is a Ayala catheter in place and there is prostatic  hypertrophy, but there appears to be blood within the urinary bladder as  well. There is no pelvic inflammatory change or other abnormal fluid  collection. There is extensive sigmoid diverticulosis but no CT evidence  of diverticulitis. There is no CT evidence of hernia or bowel  obstruction.     There are spinal degenerative changes, but there is no acute bony  abnormality.       Impression:         Large right renal mass without hydronephrosis no dense material in the  right renal collecting system, ureter and urinary bladder likely  represent hematuria.     Multiple left renal calculi without hydronephrosis or ureterolithiasis.     Area of hyperdensity within the inferior vena cava adjacent to the renal  midpole question caval tumor thrombus.     3.6 cm  abdominal aortic aneurysm.     Extensive sigmoid diverticulosis without evidence of diverticulitis.              This report was finalized on 3/10/2025 10:03 PM by Dr. Vijay Stinson M.D on Workstation: [id number]                  Assessment:       Hematuria    COPD (chronic obstructive pulmonary disease)    Hypertension    Coronary artery disease    CHF (congestive heart failure)      Gross hematuria  Right renal mass  Afib s/p Watchman procedure 2/24/25    -d/w patient and wife that bleeding source is likely from kidney  -H/H is trending down    Plan:     -Continue CBI  - hand irrigate rocha as need  -I would recommend holding plavix, however per patient -- this was supposed to be continued until 3/24/25  -Cardiology consult to weight in on holding plavix  - Txf Hbg < 7   - Fluid resuscitation  -Urology will follow    Armen Mendes MD  03/11/25  08:17 EDT

## 2025-03-11 NOTE — CONSULTS
[facility]  [phone]  Dr. 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  He presented to the ER on 3/10/2025 due to worsening hematuria.  Of note his hematuria has been an ongoing issue since October 2024.  He follows with Dr. Clemente and urology for a known renal mass.  The diagnosis for the renal mass had not officially been made due to patient being on anticoagulation for atrial fibrillation.  Due to the hematuria and renal mass decision was made to place a Watchman device and this was done on 2/24/2025.  After this his Eliquis was stopped and he was started on dual antiplatelet therapy with aspirin and Plavix.  The plan was to continue this for 6 months.  He was going to come off of it briefly in April to do a nephroureterectomy on 4/17/2025 as well as a bladder biopsy.  Unfortunately he started having gross hematuria with profuse large clots 2 to 3 days prior to admission.  He was started on bladder irrigation after admission and seen by urology.  His bleeding had become so profuse that he became hypotensive and pale.  He was called as a rapid response and upgraded to the ICU.  I was called when he arrived on the unit as his blood pressure was in the 50s/30s.  When I arrived to his room he was pale, lethargic and systolic blood pressure in the 50s.  I immediately ordered blood transfusions and placed a central line.  Patient was really in no condition to provide a significant history therefore history was obtained primarily from chart review.  He did not admit to any new concerns or complaints.    Vijay Foote  reports current alcohol use.,  reports that he quit smoking about 15  years ago. His smoking use included cigarettes. He started smoking about 65 years ago. He does not have any smokeless tobacco history on file.     Past Hx:  has a past medical history of Asthma, Cardiomyopathy, COPD (chronic obstructive pulmonary disease), Coronary artery disease, Disease of thyroid gland, Hyperlipidemia, and Hypertension.  Surg Hx:  has a past surgical history that includes Trigger finger release (Left); Rotator cuff repair (Left); Tonsillectomy; Mastoidectomy (Right); ORIF ankle fracture (Right); Cardiac catheterization; and Total shoulder arthroplasty w/ distal clavicle excision (Right, 8/11/2022).  FH: family history is not on file.  SH:  reports that he quit smoking about 15 years ago. His smoking use included cigarettes. He started smoking about 65 years ago. He does not have any smokeless tobacco history on file. He reports current alcohol use. He reports that he does not currently use drugs.    Medications Prior to Admission   Medication Sig Dispense Refill Last Dose/Taking    albuterol (PROVENTIL) (2.5 MG/3ML) 0.083% nebulizer solution Take 2.5 mg by nebulization Every 4 (Four) Hours As Needed for Wheezing.   3/10/2025    albuterol sulfate  (90 Base) MCG/ACT inhaler Inhale 2 puffs Every 4 (Four) Hours As Needed for Wheezing.   3/10/2025    aspirin 81 MG chewable tablet Chew 1 tablet Daily. 21 tablet 0 3/10/2025    Benralizumab (Fasenra Pen) 30 MG/ML solution auto-injector Inject  under the skin into the appropriate area as directed. INJECTION EVERY 8 WEEKS   Past Month    budesonide (PULMICORT) 1 MG/2ML nebulizer solution Take 2 mL by nebulization 2 (Two) Times a Day.   3/10/2025    furosemide (LASIX) 40 MG tablet Take 1 tablet by mouth Daily.   3/10/2025    ipratropium (ATROVENT) 0.02 % nebulizer solution Take 2.5 mL by nebulization 4 (Four) Times a Day.   3/10/2025    levothyroxine (SYNTHROID, LEVOTHROID) 125 MCG tablet Take 1 tablet by mouth Daily.   3/10/2025    lisinopril  (PRINIVIL,ZESTRIL) 2.5 MG tablet Take 1 tablet by mouth Daily.   3/10/2025    metoprolol tartrate (LOPRESSOR) 25 MG tablet Take 1 tablet by mouth 2 (Two) Times a Day.   3/10/2025    rosuvastatin (CRESTOR) 40 MG tablet Take 1 tablet by mouth Every Night.   3/10/2025    Chlorhexidine Gluconate Cloth 2 % pads Apply  topically.       clopidogrel (PLAVIX) 75 MG tablet Take 1 tablet by mouth Daily.       mupirocin (BACTROBAN) 2 % nasal ointment into the nostril(s) as directed by provider 2 (Two) Times a Day.        No Known Allergies    Review of Systems   All other systems reviewed and are negative.    Vital Signs past 24hrs  BP range: BP: ()/(40-88) 54/45  Pulse range: Heart Rate:  [] 104  Resp rate range: Resp:  [18-22] 21  Temp range: Temp (24hrs), Av.4 °F (36.3 °C), Min:96.9 °F (36.1 °C), Max:97.7 °F (36.5 °C)    Oxygen range: SpO2:  [87 %-100 %] 100 %; Flow (L/min) (Oxygen Therapy):  [3] 3;   Device (Oxygen Therapy): nasal cannula  68 kg (150 lb); Body mass index is 23.49 kg/m².  Net IO Since Admission: -4,400 mL [25 0928]        Mechanical Ventilator:     Physical Exam  Vitals reviewed.   Constitutional:       General: He is not in acute distress.     Appearance: Normal appearance.   HENT:      Head: Normocephalic and atraumatic.   Eyes:      Extraocular Movements: Extraocular movements intact.      Conjunctiva/sclera: Conjunctivae normal.      Pupils: Pupils are equal, round, and reactive to light.   Cardiovascular:      Rate and Rhythm: Regular rhythm. Tachycardia present.      Heart sounds: No murmur heard.     No friction rub. No gallop.   Pulmonary:      Effort: Pulmonary effort is normal. No respiratory distress.      Breath sounds: Normal breath sounds. No wheezing, rhonchi or rales.   Abdominal:      General: Abdomen is flat.      Palpations: Abdomen is soft.      Tenderness: There is no abdominal tenderness.   Musculoskeletal:         General: No swelling or tenderness. Normal range  of motion.      Cervical back: Normal range of motion. No rigidity or tenderness.   Skin:     General: Skin is warm and dry.      Coloration: Skin is pale.      Findings: No rash.   Neurological:      General: No focal deficit present.      Mental Status: He is alert and oriented to person, place, and time.   Psychiatric:         Mood and Affect: Mood normal.         Behavior: Behavior normal.         Results Review:    I have reviewed the laboratory and imaging data from current admission. My annotations are as noted in assessment and plan.  Result Review:  I have personally reviewed the results from the time of this admission to 3/11/2025 09:28 EDT and agree with these findings:  [x]  Laboratory list / accordion  [x]  Microbiology  [x]  Radiology  [x]  EKG/Telemetry   [x]  Cardiology/Vascular   [x]  Pathology  [x]  Old records  []  Other:        Medication Review:  I have reviewed the current MAR. My annotations are as noted in assessment and plan.    norepinephrine, 0.02-0.3 mcg/kg/min      Lines, Drains & Airways       Active LDAs       Name Placement date Placement time Site Days    Peripheral IV 08/11/22 0528 Left Antecubital 08/11/22  0528  Antecubital  943    Peripheral IV 08/12/22 1030 Left;Posterior Hand 08/12/22  1030  Hand  941    Peripheral IV 03/10/25 2307 Right Antecubital 03/10/25  2307  Antecubital  less than 1    Urethral Catheter Other (Comment) 24 Fr. 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  Known history of right renal mass and recurrent hematuria.  Had recent Watchman device placed for A-fib on 2/24/2025 so he could stop anticoagulation and was subsequently placed on DAPT for 6 months.  Had rapid response on the floor due to hypotension and blood loss.  - Continue holding home ASA and Plavix  - Transfusing blood and platelets  - Wean vasopressors for goal MAP greater than 65  - Start empiric antibiotics for possible sepsis  - Urology consulted and following regarding hematuria and right renal mass.  Continuing continuous bladder irrigation  - Cardiology consulted given need to hold DAPT  - ICU core measures    Critical care time 75 minutes    Electronically signed by Dony Spears DO, 03/11/25, 9:28 AM EDT.      Part of this note may be an electronic transcription/translation of spoken language to printed text using the Dragon Dictation System.

## 2025-03-11 NOTE — CONSULTS
Date of Hospital Visit: 25  Encounter Provider: Anna Sheth MD  Place of Service: [facility] CARDIOLOGY  Patient Name: Vijay Foote  :1940  Referral Provider: Jose Che MD    Chief complaint hematuria    History of Present Illness:    Vijay Foote is a 84 y.o. male patient of [facility] with a history of ischemic cardiomyopathy, chronic HFrEF, chronic obstructive pulmonary disease on 3 L of oxygen, hyperlipidemia, hypertension, sleep apnea on CPAP, coronary artery disease status post RCA and LAD stent. In , PAF.     Primary cardiologist is Dr. Pickens.  Patient presented to the office in 2024 with complaints of retrosternal sternal chest pain.  An echo in July showed a decline in his EF to 30 to 35% while his previous echo revealed a normal systolic function.     Last cardiac cath was in 2024 and he was found to have left ventricular dysfunction which was related to IVCD.  It went on to have a BiV pacemaker placed for resynchronization therapy.  Following pacemaker placement he was found to have paroxysmal atrial fibrillation and was placed on anticoagulation with Eliquis.  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  Surgical intervention was planned..    He followed up with Oleg Moore in September and then in 2024.  He initially showed 100% A-fib burden but subsequently his A-fib had decreased significantly.  Metoprolol was increased to 37.5 to obtain better the rate control.  Options were discussed although limited due to his advanced COPD and heart failure only candidate for Tikosyn at this point.  It was discussed with him about an ablation although he would be a poor candidate considering his advanced COPD and chronic O2 dependency.    Patient was last seen by [facility] on 1/3/2025.  His mobility is extremely  limited and he is short of breath on minimal activity such as going from the bathroom or even showering.  He is maintained on low-dose lisinopril and metoprolol goal-directed medical therapy titration has been difficult due to his low blood pressures.  He reported he recently lost 19 pounds of weight.  He complained of some swelling in his legs but denied any nausea, vomiting, headaches, dizziness, diaphoresis, palpitations, near-syncope or syncope.  Complains of mild chest discomfort.  Patient denied significant improvement in shortness of breath after the BiV implant.  Patient reported prolonged episodes of hematuria for 5 days and him hemoglobin levels have trended down.  It was felt he was a candidate for a Watchman and was referred to Dr. Palomo.     Patient was seen by Dr. Palomo on 2/12/2025 for opinion regarding watchman.  Patient was still having intermittent mild hematuria but was better than it was when it first began.  She was able to remain on anticoagulation.  He was having problems with epistaxis.  He denied falls his hemoglobin was 11.2.  He denied any recent chest pain.  Chronically short of breath and uses oxygen because of his COPD.  It was discussed that the patient was appropriate for a Watchman device and patient agreed to proceed.  Watchman device placed on 2/24/2025.     Patient presented to the ER on 3/10/2025 with complaints of hematuria for the last several days passing large clots yesterday.  Patient was able to urinate a small amount this morning but unable to urinate since.  He complains of pressure and the need to urinate.  He is also having lower abdominal pain.  He denied nausea, vomiting or back pain.  Patient is on aspirin and Plavix but not on Eliquis since his Watchman procedure.  In ER, BUN 27, creatinine 1.23, chloride 96, lipase 66, lactate 3.4, hemoglobin 11.2, CT of the abdomen pelvis showed large right renal mass without hydronephrosis no dense material in the right renal  collecting system, ureter and urinary bladder likely represent hematuria. Multiple left renal calculi without hydronephrosis or ureterolithiasis. Area of hyperdensity within the inferior vena cava adjacent to the renal midpole question caval tumor thrombus. 3.6 cm abdominal aortic aneurysm. Extensive sigmoid diverticulosis without evidence of diverticulitis.     Urology consulted.     I have been asked to see for gross hematuria on Plavix and aspirin.  He is very weak but denied any chest pain or difficulty breathing.      CATH 8/7/24   Conclusions     Diagnostic Summary/Impression   Mild non obstructive CAD.   Patent stent in prox LAD   Moderate LV systolic dysfunction   Diagnostic Recommendations   Medical therapy.       ECHO 7/8/24  Summary:                 The ejection fraction biplane was calculated at 31%.                            The estimated ejection fraction is 30-35%.                            Overall left ventricular systolic function is moderately depressed.                            The left ventricle is mild to moderately dilated.                            Mitral valve tissue Doppler E/E'' ratio consistent with elevated left atrial pressures.                            There is no evidence of significant valvular heart disease.     Past Medical History:   Diagnosis Date    Asthma     Cardiomyopathy     COPD (chronic obstructive pulmonary disease)     Coronary artery disease     Disease of thyroid gland     Hyperlipidemia     Hypertension        Past Surgical History:   Procedure Laterality Date    CARDIAC CATHETERIZATION      MASTOIDECTOMY Right     ORIF ANKLE FRACTURE Right     ROTATOR CUFF REPAIR Left     TONSILLECTOMY      TOTAL SHOULDER ARTHROPLASTY W/ DISTAL CLAVICLE EXCISION Right 8/11/2022    Procedure: RIGHT REVERSE TOTAL SHOULDER ARTHROPLASTY;  Surgeon: Wali Orellana MD;  Location: [facility] OR [facility] [place];  Service: Orthopedics;  Laterality: Right;    TRIGGER FINGER RELEASE Left         Medications Prior to Admission   Medication Sig Dispense Refill Last Dose/Taking    albuterol (PROVENTIL) (2.5 MG/3ML) 0.083% nebulizer solution Take 2.5 mg by nebulization Every 4 (Four) Hours As Needed for Wheezing.   3/10/2025    albuterol sulfate  (90 Base) MCG/ACT inhaler Inhale 2 puffs Every 4 (Four) Hours As Needed for Wheezing.   3/10/2025    aspirin 81 MG chewable tablet Chew 1 tablet Daily. 21 tablet 0 3/10/2025    Benralizumab (Fasenra Pen) 30 MG/ML solution auto-injector Inject  under the skin into the appropriate area as directed. INJECTION EVERY 8 WEEKS   Past Month    budesonide (PULMICORT) 1 MG/2ML nebulizer solution Take 2 mL by nebulization 2 (Two) Times a Day.   3/10/2025    furosemide (LASIX) 40 MG tablet Take 1 tablet by mouth Daily.   3/10/2025    ipratropium (ATROVENT) 0.02 % nebulizer solution Take 2.5 mL by nebulization 4 (Four) Times a Day.   3/10/2025    levothyroxine (SYNTHROID, LEVOTHROID) 125 MCG tablet Take 1 tablet by mouth Daily.   3/10/2025    lisinopril (PRINIVIL,ZESTRIL) 2.5 MG tablet Take 1 tablet by mouth Daily.   3/10/2025    metoprolol tartrate (LOPRESSOR) 25 MG tablet Take 1 tablet by mouth 2 (Two) Times a Day.   3/10/2025    rosuvastatin (CRESTOR) 40 MG tablet Take 1 tablet by mouth Every Night.   3/10/2025    Chlorhexidine Gluconate Cloth 2 % pads Apply  topically.       clopidogrel (PLAVIX) 75 MG tablet Take 1 tablet by mouth Daily.       mupirocin (BACTROBAN) 2 % nasal ointment into the nostril(s) as directed by provider 2 (Two) Times a Day.          Current Meds  Scheduled Meds:mupirocin, 1 Application, Each Nare, BID  piperacillin-tazobactam, 4.5 g, Intravenous, Once  piperacillin-tazobactam, 4.5 g, Intravenous, Q8H  senna-docusate sodium, 2 tablet, Oral, BID  sodium chloride, 10 mL, Intravenous, Q12H      Continuous Infusions:norepinephrine, 0.02-0.3 mcg/kg/min, Last Rate: 0.02 mcg/kg/min (03/11/25 3750)      PRN Meds:.  acetaminophen    senna-docusate  "sodium **AND** polyethylene glycol **AND** bisacodyl **AND** bisacodyl    Morphine    nitroglycerin    ondansetron ODT **OR** ondansetron    ondansetron    sodium chloride    sodium chloride    sodium chloride    sodium chloride    Allergies as of 03/10/2025    (No Known Allergies)       Social History     Socioeconomic History    Marital status:    Tobacco Use    Smoking status: Former     Current packs/day: 0.00     Types: Cigarettes     Start date: 1960     Quit date: 2010     Years since quitting: 15.2   Vaping Use    Vaping status: Never Used   Substance and Sexual Activity    Alcohol use: Yes     Comment: SOCIAL    Drug use: Not Currently       Family History   Problem Relation Age of Onset    Malig Hyperthermia Neg Hx        REVIEW OF SYSTEMS:   12 point ROS was performed and is negative except as outlined in HPI       Objective:   Temp:  [96.9 °F (36.1 °C)-97.7 °F (36.5 °C)] 97.6 °F (36.4 °C)  Heart Rate:  [] 109  Resp:  [18-23] 22  BP: ()/(33-88) 80/58  Body mass index is 23.49 kg/m².  Flowsheet Rows      Flowsheet Row First Filed Value   Admission Height 170.2 cm (67\") Documented at 03/10/2025 1735   Admission Weight 68 kg (150 lb) Documented at 03/10/2025 1735          Vitals:    03/11/25 1255   BP: (!) 80/58   Pulse:    Resp:    Temp:    SpO2:        General Appearance:    Alert, cooperative, in no acute distress   Head:    Normocephalic, without obvious abnormality, atraumatic   Throat:   oral mucosa moist   Lungs:     Clear to auscultation,respirations regular, even and unlabored    Heart:    irregular rhythm and normal rate, normal S1 and S2, no murmur, no gallop, no rub, no click   Extremities:   Moves all extremities well, no edema, no cyanosis, no redness   Pulses:   Pulses palpable and equal bilaterally. Normal radial, carotid,  dorsalis pedis and posterior tibial pulses bilaterally.      Lab Review:      Results from last 7 days   Lab Units 03/11/25  1214 03/11/25  0617 " 03/10/25  1743   SODIUM mmol/L 139   < > 136   POTASSIUM mmol/L 5.0   < > 4.9   CHLORIDE mmol/L 105   < > 96*   CO2 mmol/L 22.4   < > 26.9   BUN mg/dL 29*   < > 27*   CREATININE mg/dL 1.31*   < > 1.23   CALCIUM mg/dL 8.2*   < > 10.9*   BILIRUBIN mg/dL  --   --  0.7   ALK PHOS U/L  --   --  63   ALT (SGPT) U/L  --   --  14   AST (SGOT) U/L  --   --  21   GLUCOSE mg/dL 145*   < > 131*    < > = values in this interval not displayed.         @LABRCNT(bnp)@  Results from last 7 days   Lab Units 03/11/25  1214 03/11/25  0617 03/11/25  0059 03/10/25  1743   WBC 10*3/mm3 15.06* 10.22  --  8.83   HEMOGLOBIN g/dL 7.0* 8.9* 9.7* 11.2*   HEMATOCRIT % 22.7* 28.6* 31.6* 36.9*   PLATELETS 10*3/mm3 278 233  --  260         Results from last 7 days   Lab Units 03/11/25  1214   MAGNESIUM mg/dL 1.9                       I personally viewed and interpreted the patient's EKG/Telemetry data        Hematuria    COPD (chronic obstructive pulmonary disease)    Hypertension    Coronary artery disease    CHF (congestive heart failure)    Renal mass    Presence of Watchman left atrial appendage closure device      Assessment and Plan:    Right renal mass, 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  Hematuria  Severe anemia  CKD  Atrial fibrillation with recent watchman done 2/24/2025.  Cardiomyopathy with biventricular pacing device-EF 30-35%  CAD, last cardiac catheterization done 8/7/2024 showing mild nonobstructive disease and patent proximal LAD stent.  3.6 cm abdominal aortic aneurysm    Hold aspirin and Plavix-no other cardiac testing at this time    Cardiac status is stable, blood pressure is low With high normal heart rates.  This is due to anemia.  Continue supportive care.    Transfuse for hemoglobin less than 7.5.    Will follow    Anna Sheth MD  03/11/25  13:31 EDT.  Time spent in reviewing chart, discussion and examination:

## 2025-03-11 NOTE — PROCEDURES
Ultrasound Guided Central Venous Catheter Insertion Procedure Note      Indications:  vascular access, shock    Procedure Details   Informed consent was obtained for the procedure, including sedation.  Risks of lung perforation, hemorrhage, arrhythmia, and adverse drug reaction were discussed.     Maximum sterile technique was used including usual patient drapes, antiseptics and physician sterile garments.    Under sterile conditions the skin above the on the right internal jugular vein was prepped with chlorhexidine and covered with a sterile drape. A sterile ultrasound probe was used to localize the target vein. It was clearly visualized. Local anesthesia was applied to the skin and subcutaneous tissues with lidocaine 1%. An 18-gauge needle was then inserted into the vein under ultrasound guidance. A guide wire was then threaded into the vein. A central venous catheter was then inserted into the vessel over the guide wire. The catheter was sutured into place and dressed following sterile protocol with Biopatch placed. All 4 ports were flushed and deemed patent.    Findings:  There were no changes to vital signs. All catheter ports were flushed with saline. Patient did tolerate procedure well.    Recommendations:  CXR ordered to verify placement.       Dony Spears DO  [facility], [facility]  Pulmonary and Critical Care Medicine

## 2025-03-12 ENCOUNTER — APPOINTMENT (OUTPATIENT)
Dept: GENERAL RADIOLOGY | Facility: HOSPITAL | Age: 85
End: 2025-03-12
Payer: MEDICARE

## 2025-03-12 ENCOUNTER — ANESTHESIA (OUTPATIENT)
Dept: PERIOP | Facility: HOSPITAL | Age: 85
End: 2025-03-12
Payer: MEDICARE

## 2025-03-12 ENCOUNTER — ANESTHESIA EVENT (OUTPATIENT)
Dept: PERIOP | Facility: HOSPITAL | Age: 85
End: 2025-03-12
Payer: MEDICARE

## 2025-03-12 LAB
ALBUMIN SERPL-MCNC: 2.4 G/DL (ref 3.5–5.2)
ALBUMIN/GLOB SERPL: 1 G/DL
ALP SERPL-CCNC: 40 U/L (ref 39–117)
ALT SERPL W P-5'-P-CCNC: 9 U/L (ref 1–41)
ANION GAP SERPL CALCULATED.3IONS-SCNC: 11.1 MMOL/L (ref 5–15)
AST SERPL-CCNC: 19 U/L (ref 1–40)
BASOPHILS # BLD AUTO: 0 10*3/MM3 (ref 0–0.2)
BASOPHILS # BLD AUTO: 0.01 10*3/MM3 (ref 0–0.2)
BASOPHILS # BLD AUTO: 0.02 10*3/MM3 (ref 0–0.2)
BASOPHILS NFR BLD AUTO: 0 % (ref 0–1.5)
BASOPHILS NFR BLD AUTO: 0.1 % (ref 0–1.5)
BASOPHILS NFR BLD AUTO: 0.1 % (ref 0–1.5)
BH BB BLOOD EXPIRATION DATE: NORMAL
BH BB BLOOD TYPE BARCODE: 6200
BH BB DISPENSE STATUS: NORMAL
BH BB PRODUCT CODE: NORMAL
BH BB UNIT NUMBER: NORMAL
BILIRUB SERPL-MCNC: 0.6 MG/DL (ref 0–1.2)
BUN SERPL-MCNC: 24 MG/DL (ref 8–23)
BUN/CREAT SERPL: 24.5 (ref 7–25)
CALCIUM SPEC-SCNC: 7.7 MG/DL (ref 8.6–10.5)
CHLORIDE SERPL-SCNC: 111 MMOL/L (ref 98–107)
CO2 SERPL-SCNC: 19.9 MMOL/L (ref 22–29)
CREAT SERPL-MCNC: 0.98 MG/DL (ref 0.76–1.27)
DEPRECATED RDW RBC AUTO: 44 FL (ref 37–54)
DEPRECATED RDW RBC AUTO: 45.4 FL (ref 37–54)
DEPRECATED RDW RBC AUTO: 46.2 FL (ref 37–54)
EGFRCR SERPLBLD CKD-EPI 2021: 77 ML/MIN/1.73
EOSINOPHIL # BLD AUTO: 0 10*3/MM3 (ref 0–0.4)
EOSINOPHIL NFR BLD AUTO: 0 % (ref 0.3–6.2)
ERYTHROCYTE [DISTWIDTH] IN BLOOD BY AUTOMATED COUNT: 14.2 % (ref 12.3–15.4)
ERYTHROCYTE [DISTWIDTH] IN BLOOD BY AUTOMATED COUNT: 14.3 % (ref 12.3–15.4)
ERYTHROCYTE [DISTWIDTH] IN BLOOD BY AUTOMATED COUNT: 14.4 % (ref 12.3–15.4)
GLOBULIN UR ELPH-MCNC: 2.3 GM/DL
GLUCOSE BLDC GLUCOMTR-MCNC: 151 MG/DL (ref 70–130)
GLUCOSE BLDC GLUCOMTR-MCNC: 166 MG/DL (ref 70–130)
GLUCOSE SERPL-MCNC: 146 MG/DL (ref 65–99)
HCT VFR BLD AUTO: 21 % (ref 37.5–51)
HCT VFR BLD AUTO: 22.7 % (ref 37.5–51)
HCT VFR BLD AUTO: 24.1 % (ref 37.5–51)
HGB BLD-MCNC: 6.6 G/DL (ref 13–17.7)
HGB BLD-MCNC: 7.2 G/DL (ref 13–17.7)
HGB BLD-MCNC: 8 G/DL (ref 13–17.7)
IMM GRANULOCYTES # BLD AUTO: 0.04 10*3/MM3 (ref 0–0.05)
IMM GRANULOCYTES # BLD AUTO: 0.06 10*3/MM3 (ref 0–0.05)
IMM GRANULOCYTES # BLD AUTO: 0.11 10*3/MM3 (ref 0–0.05)
IMM GRANULOCYTES NFR BLD AUTO: 0.5 % (ref 0–0.5)
IMM GRANULOCYTES NFR BLD AUTO: 0.6 % (ref 0–0.5)
IMM GRANULOCYTES NFR BLD AUTO: 0.8 % (ref 0–0.5)
LYMPHOCYTES # BLD AUTO: 0.51 10*3/MM3 (ref 0.7–3.1)
LYMPHOCYTES # BLD AUTO: 0.81 10*3/MM3 (ref 0.7–3.1)
LYMPHOCYTES # BLD AUTO: 0.82 10*3/MM3 (ref 0.7–3.1)
LYMPHOCYTES NFR BLD AUTO: 5.8 % (ref 19.6–45.3)
LYMPHOCYTES NFR BLD AUTO: 6 % (ref 19.6–45.3)
LYMPHOCYTES NFR BLD AUTO: 7.9 % (ref 19.6–45.3)
MAGNESIUM SERPL-MCNC: 1.8 MG/DL (ref 1.6–2.4)
MCH RBC QN AUTO: 27.4 PG (ref 26.6–33)
MCH RBC QN AUTO: 27.8 PG (ref 26.6–33)
MCH RBC QN AUTO: 28.5 PG (ref 26.6–33)
MCHC RBC AUTO-ENTMCNC: 31.4 G/DL (ref 31.5–35.7)
MCHC RBC AUTO-ENTMCNC: 31.7 G/DL (ref 31.5–35.7)
MCHC RBC AUTO-ENTMCNC: 33.2 G/DL (ref 31.5–35.7)
MCV RBC AUTO: 85.8 FL (ref 79–97)
MCV RBC AUTO: 87.1 FL (ref 79–97)
MCV RBC AUTO: 87.6 FL (ref 79–97)
MONOCYTES # BLD AUTO: 1.21 10*3/MM3 (ref 0.1–0.9)
MONOCYTES # BLD AUTO: 1.6 10*3/MM3 (ref 0.1–0.9)
MONOCYTES # BLD AUTO: 2.01 10*3/MM3 (ref 0.1–0.9)
MONOCYTES NFR BLD AUTO: 14.2 % (ref 5–12)
MONOCYTES NFR BLD AUTO: 14.3 % (ref 5–12)
MONOCYTES NFR BLD AUTO: 15.7 % (ref 5–12)
NEUTROPHILS NFR BLD AUTO: 11.24 10*3/MM3 (ref 1.7–7)
NEUTROPHILS NFR BLD AUTO: 6.68 10*3/MM3 (ref 1.7–7)
NEUTROPHILS NFR BLD AUTO: 7.73 10*3/MM3 (ref 1.7–7)
NEUTROPHILS NFR BLD AUTO: 75.7 % (ref 42.7–76)
NEUTROPHILS NFR BLD AUTO: 79.1 % (ref 42.7–76)
NEUTROPHILS NFR BLD AUTO: 79.2 % (ref 42.7–76)
NRBC BLD AUTO-RTO: 0 /100 WBC (ref 0–0.2)
PHOSPHATE SERPL-MCNC: 2.5 MG/DL (ref 2.5–4.5)
PLATELET # BLD AUTO: 144 10*3/MM3 (ref 140–450)
PLATELET # BLD AUTO: 166 10*3/MM3 (ref 140–450)
PLATELET # BLD AUTO: 187 10*3/MM3 (ref 140–450)
PMV BLD AUTO: 9.1 FL (ref 6–12)
PMV BLD AUTO: 9.2 FL (ref 6–12)
PMV BLD AUTO: 9.3 FL (ref 6–12)
POTASSIUM SERPL-SCNC: 4.4 MMOL/L (ref 3.5–5.2)
PROT SERPL-MCNC: 4.7 G/DL (ref 6–8.5)
RBC # BLD AUTO: 2.41 10*6/MM3 (ref 4.14–5.8)
RBC # BLD AUTO: 2.59 10*6/MM3 (ref 4.14–5.8)
RBC # BLD AUTO: 2.81 10*6/MM3 (ref 4.14–5.8)
SODIUM SERPL-SCNC: 142 MMOL/L (ref 136–145)
UNIT  ABO: NORMAL
UNIT  RH: NORMAL
WBC NRBC COR # BLD AUTO: 10.21 10*3/MM3 (ref 3.4–10.8)
WBC NRBC COR # BLD AUTO: 14.2 10*3/MM3 (ref 3.4–10.8)
WBC NRBC COR # BLD AUTO: 8.44 10*3/MM3 (ref 3.4–10.8)

## 2025-03-12 PROCEDURE — 25810000003 SODIUM CHLORIDE 0.9 % SOLUTION: Performed by: STUDENT IN AN ORGANIZED HEALTH CARE EDUCATION/TRAINING PROGRAM

## 2025-03-12 PROCEDURE — 94761 N-INVAS EAR/PLS OXIMETRY MLT: CPT

## 2025-03-12 PROCEDURE — 86900 BLOOD TYPING SEROLOGIC ABO: CPT

## 2025-03-12 PROCEDURE — 25010000002 PROPOFOL 10 MG/ML EMULSION: Performed by: NURSE ANESTHETIST, CERTIFIED REGISTERED

## 2025-03-12 PROCEDURE — 0TCB8ZZ EXTIRPATION OF MATTER FROM BLADDER, VIA NATURAL OR ARTIFICIAL OPENING ENDOSCOPIC: ICD-10-PCS | Performed by: UROLOGY

## 2025-03-12 PROCEDURE — 85025 COMPLETE CBC W/AUTO DIFF WBC: CPT | Performed by: STUDENT IN AN ORGANIZED HEALTH CARE EDUCATION/TRAINING PROGRAM

## 2025-03-12 PROCEDURE — 25010000002 ONDANSETRON PER 1 MG: Performed by: NURSE ANESTHETIST, CERTIFIED REGISTERED

## 2025-03-12 PROCEDURE — 74420 UROGRAPHY RTRGR +-KUB: CPT

## 2025-03-12 PROCEDURE — 94799 UNLISTED PULMONARY SVC/PX: CPT

## 2025-03-12 PROCEDURE — 94664 DEMO&/EVAL PT USE INHALER: CPT

## 2025-03-12 PROCEDURE — 82948 REAGENT STRIP/BLOOD GLUCOSE: CPT

## 2025-03-12 PROCEDURE — 83735 ASSAY OF MAGNESIUM: CPT | Performed by: STUDENT IN AN ORGANIZED HEALTH CARE EDUCATION/TRAINING PROGRAM

## 2025-03-12 PROCEDURE — 99232 SBSQ HOSP IP/OBS MODERATE 35: CPT | Performed by: INTERNAL MEDICINE

## 2025-03-12 PROCEDURE — 25010000002 PHENYLEPHRINE 10 MG/ML SOLUTION: Performed by: NURSE ANESTHETIST, CERTIFIED REGISTERED

## 2025-03-12 PROCEDURE — 25010000002 PIPERACILLIN SOD-TAZOBACTAM PER 1 G: Performed by: UROLOGY

## 2025-03-12 PROCEDURE — 0TJB8ZZ INSPECTION OF BLADDER, VIA NATURAL OR ARTIFICIAL OPENING ENDOSCOPIC: ICD-10-PCS | Performed by: UROLOGY

## 2025-03-12 PROCEDURE — C1769 GUIDE WIRE: HCPCS | Performed by: UROLOGY

## 2025-03-12 PROCEDURE — 25010000002 PIPERACILLIN SOD-TAZOBACTAM PER 1 G: Performed by: STUDENT IN AN ORGANIZED HEALTH CARE EDUCATION/TRAINING PROGRAM

## 2025-03-12 PROCEDURE — 25010000002 LIDOCAINE 2% SOLUTION: Performed by: NURSE ANESTHETIST, CERTIFIED REGISTERED

## 2025-03-12 PROCEDURE — 25810000003 LACTATED RINGERS PER 1000 ML: Performed by: ANESTHESIOLOGY

## 2025-03-12 PROCEDURE — 36430 TRANSFUSION BLD/BLD COMPNT: CPT

## 2025-03-12 PROCEDURE — 0T9B80Z DRAINAGE OF BLADDER WITH DRAINAGE DEVICE, VIA NATURAL OR ARTIFICIAL OPENING ENDOSCOPIC: ICD-10-PCS | Performed by: UROLOGY

## 2025-03-12 PROCEDURE — 80053 COMPREHEN METABOLIC PANEL: CPT | Performed by: STUDENT IN AN ORGANIZED HEALTH CARE EDUCATION/TRAINING PROGRAM

## 2025-03-12 PROCEDURE — 84100 ASSAY OF PHOSPHORUS: CPT | Performed by: STUDENT IN AN ORGANIZED HEALTH CARE EDUCATION/TRAINING PROGRAM

## 2025-03-12 PROCEDURE — 85025 COMPLETE CBC W/AUTO DIFF WBC: CPT | Performed by: UROLOGY

## 2025-03-12 PROCEDURE — 25010000002 SUGAMMADEX 200 MG/2ML SOLUTION: Performed by: NURSE ANESTHETIST, CERTIFIED REGISTERED

## 2025-03-12 PROCEDURE — 25010000002 MIDAZOLAM PER 1 MG: Performed by: ANESTHESIOLOGY

## 2025-03-12 PROCEDURE — P9016 RBC LEUKOCYTES REDUCED: HCPCS

## 2025-03-12 RX ORDER — FLUMAZENIL 0.1 MG/ML
0.2 INJECTION INTRAVENOUS AS NEEDED
Status: DISCONTINUED | OUTPATIENT
Start: 2025-03-12 | End: 2025-03-12 | Stop reason: HOSPADM

## 2025-03-12 RX ORDER — SODIUM CHLORIDE 0.9 % (FLUSH) 0.9 %
10 SYRINGE (ML) INJECTION EVERY 12 HOURS SCHEDULED
Status: DISCONTINUED | OUTPATIENT
Start: 2025-03-12 | End: 2025-03-18

## 2025-03-12 RX ORDER — ONDANSETRON 2 MG/ML
INJECTION INTRAMUSCULAR; INTRAVENOUS AS NEEDED
Status: DISCONTINUED | OUTPATIENT
Start: 2025-03-12 | End: 2025-03-12 | Stop reason: SURG

## 2025-03-12 RX ORDER — LIDOCAINE HYDROCHLORIDE 20 MG/ML
INJECTION, SOLUTION INFILTRATION; PERINEURAL AS NEEDED
Status: DISCONTINUED | OUTPATIENT
Start: 2025-03-12 | End: 2025-03-12 | Stop reason: SURG

## 2025-03-12 RX ORDER — SODIUM CHLORIDE 0.9 % (FLUSH) 0.9 %
20 SYRINGE (ML) INJECTION AS NEEDED
Status: DISCONTINUED | OUTPATIENT
Start: 2025-03-12 | End: 2025-03-19 | Stop reason: HOSPADM

## 2025-03-12 RX ORDER — FENTANYL CITRATE 50 UG/ML
25 INJECTION, SOLUTION INTRAMUSCULAR; INTRAVENOUS
Status: DISCONTINUED | OUTPATIENT
Start: 2025-03-12 | End: 2025-03-12 | Stop reason: HOSPADM

## 2025-03-12 RX ORDER — PHENYLEPHRINE HYDROCHLORIDE 10 MG/ML
INJECTION INTRAVENOUS AS NEEDED
Status: DISCONTINUED | OUTPATIENT
Start: 2025-03-12 | End: 2025-03-12 | Stop reason: SURG

## 2025-03-12 RX ORDER — LIDOCAINE HYDROCHLORIDE 10 MG/ML
0.5 INJECTION, SOLUTION INFILTRATION; PERINEURAL ONCE AS NEEDED
Status: DISCONTINUED | OUTPATIENT
Start: 2025-03-12 | End: 2025-03-12 | Stop reason: HOSPADM

## 2025-03-12 RX ORDER — FAMOTIDINE 20 MG/1
20 TABLET, FILM COATED ORAL
Status: DISCONTINUED | OUTPATIENT
Start: 2025-03-12 | End: 2025-03-19 | Stop reason: HOSPADM

## 2025-03-12 RX ORDER — ACETAMINOPHEN 325 MG/1
650 TABLET ORAL ONCE AS NEEDED
Status: DISCONTINUED | OUTPATIENT
Start: 2025-03-12 | End: 2025-03-12 | Stop reason: HOSPADM

## 2025-03-12 RX ORDER — HYDRALAZINE HYDROCHLORIDE 20 MG/ML
5 INJECTION INTRAMUSCULAR; INTRAVENOUS
Status: DISCONTINUED | OUTPATIENT
Start: 2025-03-12 | End: 2025-03-12 | Stop reason: HOSPADM

## 2025-03-12 RX ORDER — HYDROCODONE BITARTRATE AND ACETAMINOPHEN 5; 325 MG/1; MG/1
1 TABLET ORAL ONCE AS NEEDED
Status: DISCONTINUED | OUTPATIENT
Start: 2025-03-12 | End: 2025-03-12 | Stop reason: HOSPADM

## 2025-03-12 RX ORDER — MIDAZOLAM HYDROCHLORIDE 1 MG/ML
0.5 INJECTION, SOLUTION INTRAMUSCULAR; INTRAVENOUS
Status: DISCONTINUED | OUTPATIENT
Start: 2025-03-12 | End: 2025-03-12 | Stop reason: HOSPADM

## 2025-03-12 RX ORDER — SODIUM CHLORIDE 0.9 % (FLUSH) 0.9 %
3-10 SYRINGE (ML) INJECTION AS NEEDED
Status: DISCONTINUED | OUTPATIENT
Start: 2025-03-12 | End: 2025-03-12 | Stop reason: HOSPADM

## 2025-03-12 RX ORDER — ONDANSETRON 2 MG/ML
4 INJECTION INTRAMUSCULAR; INTRAVENOUS ONCE AS NEEDED
Status: DISCONTINUED | OUTPATIENT
Start: 2025-03-12 | End: 2025-03-12 | Stop reason: HOSPADM

## 2025-03-12 RX ORDER — IPRATROPIUM BROMIDE AND ALBUTEROL SULFATE 2.5; .5 MG/3ML; MG/3ML
3 SOLUTION RESPIRATORY (INHALATION) ONCE AS NEEDED
Status: DISCONTINUED | OUTPATIENT
Start: 2025-03-12 | End: 2025-03-12 | Stop reason: HOSPADM

## 2025-03-12 RX ORDER — MAGNESIUM HYDROXIDE 1200 MG/15ML
LIQUID ORAL AS NEEDED
Status: DISCONTINUED | OUTPATIENT
Start: 2025-03-12 | End: 2025-03-12 | Stop reason: HOSPADM

## 2025-03-12 RX ORDER — HYDROMORPHONE HYDROCHLORIDE 1 MG/ML
0.25 INJECTION, SOLUTION INTRAMUSCULAR; INTRAVENOUS; SUBCUTANEOUS
Status: DISCONTINUED | OUTPATIENT
Start: 2025-03-12 | End: 2025-03-12 | Stop reason: HOSPADM

## 2025-03-12 RX ORDER — HYDROCODONE BITARTRATE AND ACETAMINOPHEN 7.5; 325 MG/1; MG/1
1 TABLET ORAL EVERY 4 HOURS PRN
Status: DISCONTINUED | OUTPATIENT
Start: 2025-03-12 | End: 2025-03-12 | Stop reason: HOSPADM

## 2025-03-12 RX ORDER — EPHEDRINE SULFATE 50 MG/ML
5 INJECTION, SOLUTION INTRAVENOUS ONCE AS NEEDED
Status: DISCONTINUED | OUTPATIENT
Start: 2025-03-12 | End: 2025-03-12 | Stop reason: HOSPADM

## 2025-03-12 RX ORDER — LABETALOL HYDROCHLORIDE 5 MG/ML
5 INJECTION, SOLUTION INTRAVENOUS
Status: DISCONTINUED | OUTPATIENT
Start: 2025-03-12 | End: 2025-03-12 | Stop reason: HOSPADM

## 2025-03-12 RX ORDER — NALOXONE HCL 0.4 MG/ML
0.2 VIAL (ML) INJECTION AS NEEDED
Status: DISCONTINUED | OUTPATIENT
Start: 2025-03-12 | End: 2025-03-12 | Stop reason: HOSPADM

## 2025-03-12 RX ORDER — SODIUM CHLORIDE 9 MG/ML
40 INJECTION, SOLUTION INTRAVENOUS AS NEEDED
Status: DISCONTINUED | OUTPATIENT
Start: 2025-03-12 | End: 2025-03-19 | Stop reason: HOSPADM

## 2025-03-12 RX ORDER — SODIUM CHLORIDE 0.9 % (FLUSH) 0.9 %
10 SYRINGE (ML) INJECTION AS NEEDED
Status: DISCONTINUED | OUTPATIENT
Start: 2025-03-12 | End: 2025-03-19 | Stop reason: HOSPADM

## 2025-03-12 RX ORDER — ATROPINE SULFATE 0.4 MG/ML
0.4 INJECTION, SOLUTION INTRAMUSCULAR; INTRAVENOUS; SUBCUTANEOUS ONCE AS NEEDED
Status: DISCONTINUED | OUTPATIENT
Start: 2025-03-12 | End: 2025-03-12 | Stop reason: HOSPADM

## 2025-03-12 RX ORDER — SUCCINYLCHOLINE/SOD CL,ISO/PF 200MG/10ML
SYRINGE (ML) INTRAVENOUS AS NEEDED
Status: DISCONTINUED | OUTPATIENT
Start: 2025-03-12 | End: 2025-03-12 | Stop reason: SURG

## 2025-03-12 RX ORDER — PROPOFOL 10 MG/ML
VIAL (ML) INTRAVENOUS AS NEEDED
Status: DISCONTINUED | OUTPATIENT
Start: 2025-03-12 | End: 2025-03-12 | Stop reason: SURG

## 2025-03-12 RX ORDER — SODIUM CHLORIDE 0.9 % (FLUSH) 0.9 %
3 SYRINGE (ML) INJECTION EVERY 12 HOURS SCHEDULED
Status: DISCONTINUED | OUTPATIENT
Start: 2025-03-12 | End: 2025-03-12 | Stop reason: HOSPADM

## 2025-03-12 RX ORDER — FAMOTIDINE 10 MG/ML
20 INJECTION, SOLUTION INTRAVENOUS ONCE
Status: DISCONTINUED | OUTPATIENT
Start: 2025-03-12 | End: 2025-03-12 | Stop reason: HOSPADM

## 2025-03-12 RX ORDER — ROCURONIUM BROMIDE 10 MG/ML
INJECTION, SOLUTION INTRAVENOUS AS NEEDED
Status: DISCONTINUED | OUTPATIENT
Start: 2025-03-12 | End: 2025-03-12 | Stop reason: SURG

## 2025-03-12 RX ORDER — FENTANYL CITRATE 50 UG/ML
50 INJECTION, SOLUTION INTRAMUSCULAR; INTRAVENOUS ONCE AS NEEDED
Status: DISCONTINUED | OUTPATIENT
Start: 2025-03-12 | End: 2025-03-12 | Stop reason: HOSPADM

## 2025-03-12 RX ORDER — SODIUM CHLORIDE, SODIUM LACTATE, POTASSIUM CHLORIDE, CALCIUM CHLORIDE 600; 310; 30; 20 MG/100ML; MG/100ML; MG/100ML; MG/100ML
9 INJECTION, SOLUTION INTRAVENOUS CONTINUOUS
Status: ACTIVE | OUTPATIENT
Start: 2025-03-12 | End: 2025-03-13

## 2025-03-12 RX ADMIN — Medication 10 ML: at 13:00

## 2025-03-12 RX ADMIN — PHENYLEPHRINE HYDROCHLORIDE 100 MCG: 10 INJECTION INTRAVENOUS at 02:26

## 2025-03-12 RX ADMIN — Medication 3 ML: at 01:43

## 2025-03-12 RX ADMIN — ROCURONIUM BROMIDE 15 MG: 10 INJECTION INTRAVENOUS at 02:10

## 2025-03-12 RX ADMIN — Medication 10 ML: at 20:25

## 2025-03-12 RX ADMIN — SODIUM CHLORIDE 150 ML/HR: 0.9 INJECTION, SOLUTION INTRAVENOUS at 13:50

## 2025-03-12 RX ADMIN — MIDAZOLAM 0.5 MG: 1 INJECTION INTRAMUSCULAR; INTRAVENOUS at 01:46

## 2025-03-12 RX ADMIN — ROSUVASTATIN CALCIUM 40 MG: 20 TABLET, FILM COATED ORAL at 20:25

## 2025-03-12 RX ADMIN — ONDANSETRON 4 MG: 2 INJECTION, SOLUTION INTRAMUSCULAR; INTRAVENOUS at 02:04

## 2025-03-12 RX ADMIN — ALBUTEROL SULFATE 2.5 MG: 2.5 SOLUTION RESPIRATORY (INHALATION) at 17:48

## 2025-03-12 RX ADMIN — SUGAMMADEX 200 MG: 100 INJECTION, SOLUTION INTRAVENOUS at 02:29

## 2025-03-12 RX ADMIN — Medication 10 ML: at 20:26

## 2025-03-12 RX ADMIN — FAMOTIDINE 20 MG: 10 INJECTION INTRAVENOUS at 01:45

## 2025-03-12 RX ADMIN — NOREPINEPHRINE BITARTRATE 0.14 MCG/KG/MIN: 0.03 INJECTION, SOLUTION INTRAVENOUS at 17:09

## 2025-03-12 RX ADMIN — MUPIROCIN 1 APPLICATION: 20 OINTMENT TOPICAL at 09:20

## 2025-03-12 RX ADMIN — PIPERACILLIN SODIUM AND TAZOBACTAM SODIUM 4.5 G: 4; .5 INJECTION, POWDER, LYOPHILIZED, FOR SOLUTION INTRAVENOUS at 03:35

## 2025-03-12 RX ADMIN — SODIUM CHLORIDE, SODIUM LACTATE, POTASSIUM CHLORIDE, AND CALCIUM CHLORIDE 9 ML/HR: .6; .31; .03; .02 INJECTION, SOLUTION INTRAVENOUS at 01:43

## 2025-03-12 RX ADMIN — PIPERACILLIN SODIUM AND TAZOBACTAM SODIUM 4.5 G: 4; .5 INJECTION, POWDER, LYOPHILIZED, FOR SOLUTION INTRAVENOUS at 11:04

## 2025-03-12 RX ADMIN — Medication 100 MG: at 01:55

## 2025-03-12 RX ADMIN — PIPERACILLIN SODIUM AND TAZOBACTAM SODIUM 4.5 G: 4; .5 INJECTION, POWDER, LYOPHILIZED, FOR SOLUTION INTRAVENOUS at 20:23

## 2025-03-12 RX ADMIN — SENNOSIDES AND DOCUSATE SODIUM 2 TABLET: 50; 8.6 TABLET ORAL at 09:20

## 2025-03-12 RX ADMIN — LEVOTHYROXINE SODIUM 125 MCG: 125 TABLET ORAL at 09:20

## 2025-03-12 RX ADMIN — FAMOTIDINE 20 MG: 20 TABLET, FILM COATED ORAL at 17:10

## 2025-03-12 RX ADMIN — Medication 10 ML: at 09:20

## 2025-03-12 RX ADMIN — SODIUM CHLORIDE 150 ML/HR: 0.9 INJECTION, SOLUTION INTRAVENOUS at 07:03

## 2025-03-12 RX ADMIN — ROCURONIUM BROMIDE 10 MG: 10 INJECTION INTRAVENOUS at 01:55

## 2025-03-12 RX ADMIN — NOREPINEPHRINE BITARTRATE 0.24 MCG/KG/MIN: 0.03 INJECTION, SOLUTION INTRAVENOUS at 08:05

## 2025-03-12 RX ADMIN — NOREPINEPHRINE BITARTRATE 0.22 MCG/KG/MIN: 0.03 INJECTION, SOLUTION INTRAVENOUS at 03:29

## 2025-03-12 RX ADMIN — BUDESONIDE 1 MG: 1 INHALANT ORAL at 19:47

## 2025-03-12 RX ADMIN — PROPOFOL 90 MG: 10 INJECTION, EMULSION INTRAVENOUS at 01:55

## 2025-03-12 RX ADMIN — MUPIROCIN 1 APPLICATION: 20 OINTMENT TOPICAL at 20:25

## 2025-03-12 RX ADMIN — LIDOCAINE HYDROCHLORIDE 100 MG: 20 INJECTION, SOLUTION INFILTRATION; PERINEURAL at 01:55

## 2025-03-12 RX ADMIN — BUDESONIDE 1 MG: 1 INHALANT ORAL at 07:53

## 2025-03-12 RX ADMIN — PHENYLEPHRINE HYDROCHLORIDE 100 MCG: 10 INJECTION INTRAVENOUS at 02:15

## 2025-03-12 NOTE — OP NOTE
Operative Note    3/12/2025      Vijay Foote  84 y.o.  1940  male  [id number]      Surgeon(s) and Role:  Chance Reyes MD - Primary     Pre-operative Diagnosis:   Gross Hematuria  Clot Retention  Catheter malfunction     Post-operative Diagnosis: Same    Procedures:  Cystoscopy, Flexible     Indications   Vijay Foote is a 84 y.o. year old man worsening gross hematuria, clot retention, Catheter unable to accommodate drainage and manual irrigation unsuccessful. Manual irrigation was attempted by me at the bedside without success. I attempted exchange of the catheter without success, unable to advance catheter in the bladder.  He is indicated for bedside Cystoscopy/catheter placement and re-initiation of Continuous Bladder Irrigation (CBI).     We discussed the benefits/risks/expectations and the patient desires surgical intervention. Risks include bleeding, infection, urinary tract infection/sepsis, damage to adjacent tissues including the genitalia, chronic pain, numbness, incontinence, stroke, heart attack, death, and need for additional procedures. Verbal consent obtained.       Findings   - Penile urethra patent  - Bulbar urethra patent   - Unable identify true urethral lumen into the bladder      Description of procedure:  The patient was properly identified. After ensuring that all of the required equipment was ready and available a surgical pause was performed.      The Flexible cystoscope was able to easily advance through the urethra. I was able to navigate through the penile and bulbar urethra. At approximately the level of the membranous urethra, I could not identify the true lumen. There was adherent clot within the urethral lumen and there was a possible false passage. The scope was withdrawn. This concluded the Cystoscopy.       Plan   - We will add him on for emergent Cystoscopy in the OR to facilitate Complex Ayala catheter placement         Chance Reyes MD  [facility]  General & Reconstructive Urology  Office: [phone]  Fax: [phone]

## 2025-03-12 NOTE — POST-PROCEDURE NOTE
[facility]  3/12/2025  02:33 EDT    S/p Cysto, Clot evacuation, Complex Ayala catheter placement     Post-op Plan:  - Transfer to PACU then back to ICU  - Continuous Bladder Irrigation (CBI)  - Keep this catheter for at least 10 days         Chance Reyes MD  [facility]  General & Reconstructive Urology  Office: [phone]  Fax: [phone]

## 2025-03-12 NOTE — ANESTHESIA PREPROCEDURE EVALUATION
Anesthesia Evaluation     Patient summary reviewed and Nursing notes reviewed   NPO Solid Status: > 8 hours  NPO Liquid Status: > 4 hours           Airway   Mallampati: II  Neck ROM: full  Possible difficult intubation  Comment: beard  Dental - normal exam     Pulmonary     breath sounds clear to auscultation  (+) a smoker Former, COPD, asthma,  Cardiovascular     Rhythm: regular    (+) pacemaker pacemaker, ICD, hypertension, CAD, cardiac stents , CHF , hyperlipidemia    ROS comment: watchman    Neuro/Psych  GI/Hepatic/Renal/Endo    (+) thyroid problem     Musculoskeletal     Abdominal    Substance History      OB/GYN          Other                      Anesthesia Plan    ASA 4 - emergent     general     intravenous induction     Anesthetic plan, risks, benefits, and alternatives have been provided, discussed and informed consent has been obtained with: patient.    CODE STATUS:    Code Status (Patient has no pulse and is not breathing): CPR (Attempt to Resuscitate)  Medical Interventions (Patient has pulse or is breathing): Full

## 2025-03-12 NOTE — OP NOTE
Operative Note    3/12/2025      Vijay Foote  84 y.o.  1940  male  [id number]      Surgeon(s) and Role:  Chance Reyes MD - Primary     Pre-operative Diagnosis:   Gross Hematuria  Clot Retention  Catheter malfunction    Post-operative Diagnosis: Same    Procedures:  Cystoscopy, Flexible  Clot Evacuation  Placement of catheter over a wire     Indications   Vijay Foote is a 84 y.o. year old man worsening gross hematuria, clot retention, Catheter unable to accommodate drainage and manual irrigation unsuccessful. Manual irrigation was attempted by me at the bedside without success. I attempted exchange of the catheter without success, unable to advance catheter in the bladder.  He is indicated for bedside Cystoscopy/catheter placement and re-initiation of Continuous Bladder Irrigation (CBI). I attempted this at the bedside in the ICU, however I was unable to find the proximal urethral lumen. I recommended that we urgently get him into the OR for placement of Aayla catheter.      We discussed the benefits/risks/expectations and the patient desires surgical intervention. Risks include bleeding, infection, urinary tract infection/sepsis, damage to adjacent tissues including the genitalia, chronic pain, numbness, incontinence, stroke, heart attack, death, and need for additional procedures.    Findings   - Large posterior membranous/prostatic urethral false passage  - 100 mL Dark, dense clot in bladder - evacuated   - No suspicious lesions in the bladder   - No obvious bleeding from either orifice   - New 24 Fr Toña 3-way catheter, 20 mL in balloon  - Connected Continuous Bladder Irrigation (CBI)    Description of procedure:  The patient was properly identified in the preoperative holding area and taken to the operating room were general anesthesia was induced. Sequential compression devices were placed on both lower extremities. The patient was placed in the cystolithotomy position and prepped and  draped in a sterile fashion with betadine. The patient was given antibiotics intravenously before the start of the surgery. After ensuring that all of the required equipment was ready and available a surgical pause was performed.      Unable to advance scope past the urethral meatus. Large posterior membranous/prostatic urethral false passage. Able to navigate scope into the bladder through true, dorsally.  About 100 mL dark dense clot evacuated. No suspicious lesions. Orthotopic ureteral orifices, no obvious bleeding rom either orifice.      Placed Sensorwire into bladder. Fluoroscopy used to confirm placement in bladder lumen.  Over the wire, a 24 Fr Toña 3-way catheter was advanced into the bladder and the balloon was inflated with 20 mL sterile water. The bladder was irrigate/aspirated multiple times, no obstruction, efflux clear.     There were no apparent complications. The patient woke up in the operating room and was taken to the recovery room in stable condition.      I was present and scrubbed for the entire procedure.      Complications: None    Specimens: None     Estimated Blood Loss:  None     Drain: 24 Fr urethral 3-way Catheter catheter, 20 mL in balloon.     Plan   - Transfer to PACU then back to ICU  - Continuous Bladder Irrigation (CBI)  - Keep this catheter for at least 10 days         Chance Reyes MD  [facility]  General & Reconstructive Urology  Office: [phone]  Fax: [phone]

## 2025-03-12 NOTE — PROGRESS NOTES
LOS: 1 day   Patient Care Team:  German Mccray MD as PCP - General (Internal Medicine)  Alicia Pickens MD as Consulting Physician (Interventional Cardiology)  Armani Siddiqui MD as Consulting Physician (Pulmonary Disease)    Chief Complaint:     F/u hematuria and anemia    Interval History:     He has no chest pain, dyspnea, does not feels heart racing or skipping.    Objective   Vital Signs  Temp:  [97 °F (36.1 °C)-98.3 °F (36.8 °C)] 98.3 °F (36.8 °C)  Heart Rate:  [] 101  Resp:  [12-28] 28  BP: ()/(34-82) 102/45    Intake/Output Summary (Last 24 hours) at 3/12/2025 0926  Last data filed at 3/12/2025 0600  Gross per 24 hour   Intake 45865.75 ml   Output 53328 ml   Net -91041.25 ml       Comfortable NAD  Neck supple, no JVD or thyromegaly appreciated  S1/S2 irregular no m/r/g  Lungs CTA B, normal effort  Abdomen S/NT/ND (+) BS, no HSM appreciated  Extremities warm, no clubbing, cyanosis, or edema  No visible or palpable skin lesions  A/Ox4, mood and affect appropriate    Results Review:      Results from last 7 days   Lab Units 03/12/25  0541 03/11/25  1214 03/11/25  0617   SODIUM mmol/L 142 139 138   POTASSIUM mmol/L 4.4 5.0 4.5   CHLORIDE mmol/L 111* 105 102   CO2 mmol/L 19.9* 22.4 25.3   BUN mg/dL 24* 29* 25*   CREATININE mg/dL 0.98 1.31* 1.05   GLUCOSE mg/dL 146* 145* 112*   CALCIUM mg/dL 7.7* 8.2* 9.0         Results from last 7 days   Lab Units 03/12/25  0541 03/11/25  2320 03/11/25  1214   WBC 10*3/mm3 14.20* 12.10* 15.06*   HEMOGLOBIN g/dL 8.0* 8.7* 7.0*   HEMATOCRIT % 24.1* 26.5* 22.7*   PLATELETS 10*3/mm3 187 198 278             Results from last 7 days   Lab Units 03/12/25  0541   MAGNESIUM mg/dL 1.8           I reviewed the patient's new clinical results.  I personally viewed and interpreted the patient's EKG/Telemetry data        Medication Review:   budesonide, 1 mg, Nebulization, BID  levothyroxine, 125 mcg, Oral, Daily  mupirocin, 1 Application, Each Nare,  BID  piperacillin-tazobactam, 4.5 g, Intravenous, Q8H  rosuvastatin, 40 mg, Oral, Nightly  senna-docusate sodium, 2 tablet, Oral, BID  sodium chloride, 10 mL, Intravenous, Q12H        lactated ringers, 9 mL/hr, Last Rate: Stopped (03/12/25 0234)  norepinephrine, 0.02-0.3 mcg/kg/min, Last Rate: 0.24 mcg/kg/min (03/12/25 0805)  sodium chloride, 150 mL/hr, Last Rate: 150 mL/hr (03/12/25 0703)        Assessment & Plan       Hematuria    COPD (chronic obstructive pulmonary disease)    Hypertension    Coronary artery disease    CHF (congestive heart failure)    Renal mass    Presence of Watchman left atrial appendage closure device    Right renal mass, 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  Hematuria  Severe anemia  CKD  Atrial fibrillation with recent watchman done 2/24/2025.  Cardiomyopathy with biventricular pacing device-EF 30-35%  CAD, last cardiac catheterization done 8/7/2024 showing mild nonobstructive disease and patent proximal LAD stent.  3.6 cm abdominal aortic aneurysm    Clot evacuation 3/11/25 - did receive prbc last night.  He is still on Levophed.  His urine is clear now.    Hold off on clopidogrel until his hemoglobin is stable and Levophed is off, maybe Friday or Saturday.  I would want him to stay in the hospital for probably 2 to 3 days after restarting clopidogrel to see if he rebleeds.  I did speak with Dr. Mendes from urology.  I also spoke with his wife.    Will follow.    Anna Sheht MD  03/12/25  09:26 EDT

## 2025-03-12 NOTE — ANESTHESIA POSTPROCEDURE EVALUATION
"Patient: Vijay Foote    Procedure Summary       Date: 03/12/25 Room / Location: [facility] OR 01 / [facility] MAIN OR    Anesthesia Start: 0149 Anesthesia Stop: 0243    Procedure: CYSTOSCOPY, clot evacuation, catheter insertion (Urethra) Diagnosis:     Surgeons: Chance Reyes MD Provider: Fei Ogden MD    Anesthesia Type: general ASA Status: 4 - Emergent            Anesthesia Type: general    Vitals  Vitals Value Taken Time   /43 03/12/25 02:50   Temp 36.7 °C (98.1 °F) 03/12/25 02:41   Pulse 87 03/12/25 02:58   Resp 18 03/12/25 02:45   SpO2 99 % 03/12/25 02:58   Vitals shown include unfiled device data.        Post Anesthesia Care and Evaluation    Patient location during evaluation: bedside  Patient participation: complete - patient participated  Level of consciousness: sleepy but conscious  Pain score: 0  Pain management: adequate    Airway patency: patent  Anesthetic complications: No anesthetic complications    Cardiovascular status: acceptable  Respiratory status: acceptable  Hydration status: acceptable    Comments: BP (!) 89/54 (BP Location: Left arm, Patient Position: Lying)   Pulse 91   Temp 36.7 °C (98.1 °F) (Oral)   Resp 18   Ht 170.2 cm (67\")   Wt 68 kg (150 lb)   SpO2 98%   BMI 23.49 kg/m²       "

## 2025-03-12 NOTE — ANESTHESIA PROCEDURE NOTES
Airway  Reason: elective    Date/Time: 3/12/2025 1:59 AM  Difficult airway    General Information and Staff    Patient location during procedure: OR  Anesthesiologist: Fei Ogden MD  CRNA/CAA: Mansi Lpóez CRNA    Indications and Patient Condition  Indications for airway management: airway protection    Preoxygenated: yes (pt pre-O2 with 100% O2)    Mask difficulty assessment: 0 - not attempted    Final Airway Details    Final airway type: endotracheal airway      Successful airway: ETT  Cuffed: yes   Successful intubation technique: video laryngoscopy  Adjuncts used in placement: intubating stylet  Endotracheal tube insertion site: oral  Blade: CMAC  Blade size: D  ETT size (mm): 7.5  Cormack-Lehane Classification: grade I - full view of glottis  Placement verified by: chest auscultation and capnometry   Cuff volume (mL): 7  Measured from: lips  ETT/EBT  to lips (cm): 22  Number of attempts at approach: 1  Assessment: lips, teeth, and gum same as pre-op and atraumatic intubation    Additional Comments  ATOETx1. No change in dentition. Very limited ROM of neck.

## 2025-03-12 NOTE — PROGRESS NOTES
[facility]  [phone]  Dr. Dony Spears     Subjective:  LOS: 1    Chief Complaint: Hematuria    Patient doing better today.  Denied any acute concerns or complaints.    Objective   Vital Signs past 24hrs  Temp range: Temp (24hrs), Av °F (36.7 °C), Min:97.5 °F (36.4 °C), Max:98.3 °F (36.8 °C)    BP range: BP: ()/(45-85) 103/46  Pulse range: Heart Rate:  [] 76  Resp rate range: Resp:  [12-33] 23  Device (Oxygen Therapy): nasal cannulaFlow (L/min) (Oxygen Therapy):  [2-3] 3  Oxygen range:SpO2:  [89 %-99 %] 98 %   Mechanical Ventilator:     Physical Exam  Vitals and nursing note reviewed.   Constitutional:       General: He is not in acute distress.  HENT:      Head: Normocephalic and atraumatic.   Cardiovascular:      Rate and Rhythm: Normal rate and regular rhythm.      Heart sounds: No murmur heard.  Pulmonary:      Effort: Pulmonary effort is normal. No respiratory distress.      Breath sounds: Normal breath sounds. No wheezing, rhonchi or rales.   Abdominal:      General: Abdomen is flat.      Tenderness: There is no abdominal tenderness.   Skin:     General: Skin is warm and dry.      Findings: No rash.   Neurological:      Mental Status: He is alert.       Results Review:    I have reviewed the laboratory and imaging data since the last note by [facility] physician.  My annotations are noted in assessment and plan.      Result Review:  I have personally reviewed the results from last note by [facility] physician to 3/12/2025 17:47 EDT and agree with these findings:  [x]  Laboratory list / accordion  [x]  Microbiology  [x]  Radiology  [x]  EKG/Telemetry   [x]  Cardiology/Vascular   [x]  Pathology  [x]  Old records  []  Other:    Medication Review:  I have reviewed the current MAR.  My annotations are noted in assessment and plan.    budesonide, 1 mg, Nebulization, BID  famotidine, 20 mg, Oral, BID AC  levothyroxine, 125 mcg, Oral, Daily  mupirocin, 1 Application, Each Nare,  BID  piperacillin-tazobactam, 4.5 g, Intravenous, Q8H  rosuvastatin, 40 mg, Oral, Nightly  senna-docusate sodium, 2 tablet, Oral, BID  sodium chloride, 10 mL, Intravenous, Q12H  sodium chloride, 10 mL, Intravenous, Q12H  sodium chloride, 10 mL, Intravenous, Q12H  sodium chloride, 10 mL, Intravenous, Q12H        lactated ringers, 9 mL/hr, Last Rate: Stopped (03/12/25 0234)  norepinephrine, 0.02-0.3 mcg/kg/min, Last Rate: 0.14 mcg/kg/min (03/12/25 1709)  sodium chloride, 150 mL/hr, Last Rate: 150 mL/hr (03/12/25 1350)      Lines, Drains & Airways       Active LDAs       Name Placement date Placement time Site Days    CVC Triple Lumen 03/11/25 Right Internal jugular 03/11/25  0945  Internal jugular  1    Peripheral IV 03/10/25 2307 Right Antecubital 03/10/25  2307  Antecubital  1    Peripheral IV 03/11/25 0930 Anterior;Distal;Right Forearm 03/11/25  0930  Forearm  1    Continuous Bladder Irrigation Triple-lumen 24 Fr 03/12/25  0232  Triple-lumen  less than 1                  No active isolations  Diet Orders (active) (From admission, onward)       Start     Ordered    03/12/25 0911  Diet: Cardiac; Healthy Heart (2-3 Na+); Fluid Consistency: Thin (IDDSI 0)  Diet Effective Now         03/12/25 0910                      Assessment  Shock-likely hemorrhagic cannot rule out septic  Acute blood loss anemia  Hematuria  Right renal mass  Left nephrolithiasis  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  Known history of right renal mass and recurrent hematuria.  Had recent Watchman device placed for A-fib on 2/24/2025 so he could stop anticoagulation and was subsequently placed on DAPT for 6 months.  Had  rapid response on the floor due to hypotension and blood loss.  - Continue holding home ASA and Plavix  - Transfusing blood and platelets  - Wean vasopressors for goal MAP greater than 65  - Start empiric antibiotics for possible sepsis  - Urology consulted and following regarding hematuria and right renal mass.  Went for cystoscopy on morning of 3/12/2025 where he was found to have a large posterior membranous/prostatic urethral false passage therefore the catheter was repositioned into the bladder and CBI was resumed  - Cardiology consulted given need to hold DAPT  - ICU core measures     Critical care time 35 minutes      Dony Spears DO   03/12/25  17:47 EDT      Part of this note may be an electronic transcription/translation of spoken language to printed text using the Dragon Dictation System.

## 2025-03-12 NOTE — PROGRESS NOTES
"   LOS: 1 day   Patient Care Team:  German Mccray MD as PCP - General (Internal Medicine)  Alicia Pickens MD as Consulting Physician (Interventional Cardiology)  Armani Siddiqui MD as Consulting Physician (Pulmonary Disease)      Subjective   Interval History: Underwent cystoscopy, clot evacuation, and catheter replacement. No bleeding seen from the UO at the time of surgery. Urine clear overnight.    Objective     ROS   12 POINT NEG ROS PERTINENT IN HPI      Vital Signs  Temp:  [97 °F (36.1 °C)-98.2 °F (36.8 °C)] 97.6 °F (36.4 °C)  Heart Rate:  [] 82  Resp:  [12-23] 22  BP: ()/(33-82) 114/50      Intake/Output Summary (Last 24 hours) at 3/12/2025 0741  Last data filed at 3/12/2025 0234  Gross per 24 hour   Intake 17827.75 ml   Output 19610 ml   Net -73045.25 ml       Flowsheet Rows      Flowsheet Row First Filed Value   Admission Height 170.2 cm (67\") Documented at 03/10/2025 1735   Admission Weight 68 kg (150 lb) Documented at 03/10/2025 1735            Physical Exam:     General appearance: resting comfortably  Ayala intact, urine crystal clear on slow rate CBI.        Lab Results (all)       Procedure Component Value Units Date/Time    Magnesium [[id number]]  (Normal) Collected: 03/12/25 0541    Specimen: Blood Updated: 03/12/25 0623     Magnesium 1.8 mg/dL     Comprehensive Metabolic Panel [[id number]]  (Abnormal) Collected: 03/12/25 0541    Specimen: Blood Updated: 03/12/25 0623     Glucose 146 mg/dL      BUN 24 mg/dL      Creatinine 0.98 mg/dL      Sodium 142 mmol/L      Potassium 4.4 mmol/L      Chloride 111 mmol/L      CO2 19.9 mmol/L      Calcium 7.7 mg/dL      Total Protein 4.7 g/dL      Albumin 2.4 g/dL      ALT (SGPT) 9 U/L      AST (SGOT) 19 U/L      Alkaline Phosphatase 40 U/L      Total Bilirubin 0.6 mg/dL      Globulin 2.3 gm/dL      A/G Ratio 1.0 g/dL      BUN/Creatinine Ratio 24.5     Anion Gap 11.1 mmol/L      eGFR 77.0 mL/min/1.73     Narrative:      GFR Categories " in Chronic Kidney Disease (CKD)      GFR Category          GFR (mL/min/1.73)    Interpretation  G1                     90 or greater         Normal or high (1)  G2                      60-89                Mild decrease (1)  G3a                   45-59                Mild to moderate decrease  G3b                   30-44                Moderate to severe decrease  G4                    15-29                Severe decrease  G5                    14 or less           Kidney failure          (1)In the absence of evidence of kidney disease, neither GFR category G1 or G2 fulfill the criteria for CKD.    eGFR calculation 2021 CKD-EPI creatinine equation, which does not include race as a factor    Phosphorus [[id number]]  (Normal) Collected: 03/12/25 0541    Specimen: Blood Updated: 03/12/25 0620     Phosphorus 2.5 mg/dL     CBC & Differential [[id number]]  (Abnormal) Collected: 03/12/25 0541    Specimen: Blood Updated: 03/12/25 0554    Narrative:      The following orders were created for panel order CBC & Differential.  Procedure                               Abnormality         Status                     ---------                               -----------         ------                     CBC Auto Differential[[id number]]        Abnormal            Final result                 Please view results for these tests on the individual orders.    CBC Auto Differential [[id number]]  (Abnormal) Collected: 03/12/25 0541    Specimen: Blood Updated: 03/12/25 0554     WBC 14.20 10*3/mm3      RBC 2.81 10*6/mm3      Hemoglobin 8.0 g/dL      Hematocrit 24.1 %      MCV 85.8 fL      MCH 28.5 pg      MCHC 33.2 g/dL      RDW 14.2 %      RDW-SD 44.0 fl      MPV 9.2 fL      Platelets 187 10*3/mm3      Neutrophil % 79.1 %      Lymphocyte % 5.8 %      Monocyte % 14.2 %      Eosinophil % 0.0 %      Basophil % 0.1 %      Immature Grans % 0.8 %      Neutrophils, Absolute 11.24 10*3/mm3      Lymphocytes, Absolute 0.82 10*3/mm3      Monocytes,  Absolute 2.01 10*3/mm3      Eosinophils, Absolute 0.00 10*3/mm3      Basophils, Absolute 0.02 10*3/mm3      Immature Grans, Absolute 0.11 10*3/mm3      nRBC 0.0 /100 WBC     POC Glucose Once [[id number]]  (Abnormal) Collected: 03/12/25 0543    Specimen: Blood Updated: 03/12/25 0545     Glucose 151 mg/dL     POC Glucose Once [[id number]]  (Abnormal) Collected: 03/12/25 0012    Specimen: Blood Updated: 03/12/25 0015     Glucose 166 mg/dL     CBC & Differential [[id number]]  (Abnormal) Collected: 03/11/25 2320    Specimen: Blood Updated: 03/11/25 2359    Narrative:      The following orders were created for panel order CBC & Differential.  Procedure                               Abnormality         Status                     ---------                               -----------         ------                     CBC Auto Differential[[id number]]        Abnormal            Final result                 Please view results for these tests on the individual orders.    CBC Auto Differential [[id number]]  (Abnormal) Collected: 03/11/25 2320    Specimen: Blood Updated: 03/11/25 2359     WBC 12.10 10*3/mm3      RBC 3.12 10*6/mm3      Hemoglobin 8.7 g/dL      Hematocrit 26.5 %      MCV 84.9 fL      MCH 27.9 pg      MCHC 32.8 g/dL      RDW 14.5 %      RDW-SD 44.4 fl      MPV 9.4 fL      Platelets 198 10*3/mm3      Neutrophil % 78.9 %      Lymphocyte % 5.7 %      Monocyte % 14.6 %      Eosinophil % 0.0 %      Basophil % 0.2 %      Immature Grans % 0.6 %      Neutrophils, Absolute 9.55 10*3/mm3      Lymphocytes, Absolute 0.69 10*3/mm3      Monocytes, Absolute 1.77 10*3/mm3      Eosinophils, Absolute 0.00 10*3/mm3      Basophils, Absolute 0.02 10*3/mm3      Immature Grans, Absolute 0.07 10*3/mm3      nRBC 0.0 /100 WBC     POC Glucose Once [[id number]]  (Abnormal) Collected: 03/11/25 1852    Specimen: Blood Updated: 03/11/25 1853     Glucose 144 mg/dL     Blood Culture - Blood, Arm, Left [[id number]]  (Normal) Collected: 03/10/25 1740     Specimen: Blood from Arm, Left Updated: 03/11/25 1800     Blood Culture No growth at 24 hours    Blood Culture - Blood, Arm, Right [[id number]]  (Normal) Collected: 03/10/25 1743    Specimen: Blood from Arm, Right Updated: 03/11/25 1800     Blood Culture No growth at 24 hours    Manual Differential [[id number]]  (Abnormal) Collected: 03/11/25 1214    Specimen: Blood Updated: 03/11/25 1301     Neutrophil % 94.0 %      Lymphocyte % 4.0 %      Monocyte % 2.0 %      Eosinophil % 0.0 %      Basophil % 0.0 %      Neutrophils Absolute 14.16 10*3/mm3      Lymphocytes Absolute 0.60 10*3/mm3      Monocytes Absolute 0.30 10*3/mm3      Eosinophils Absolute 0.00 10*3/mm3      Basophils Absolute 0.00 10*3/mm3      Anisocytosis Mod/2+     Microcytes Mod/2+     Poikilocytes Mod/2+     WBC Morphology Normal     Giant Platelets Slight/1+    CBC & Differential [[id number]]  (Abnormal) Collected: 03/11/25 1214    Specimen: Blood Updated: 03/11/25 1257    Narrative:      The following orders were created for panel order CBC & Differential.  Procedure                               Abnormality         Status                     ---------                               -----------         ------                     CBC Auto Differential[[id number]]        Abnormal            Final result                 Please view results for these tests on the individual orders.    CBC Auto Differential [[id number]]  (Abnormal) Collected: 03/11/25 1214    Specimen: Blood Updated: 03/11/25 1257     WBC 15.06 10*3/mm3      RBC 2.60 10*6/mm3      Hemoglobin 7.0 g/dL      Hematocrit 22.7 %      MCV 87.3 fL      MCH 26.9 pg      MCHC 30.8 g/dL      RDW 14.2 %      RDW-SD 44.9 fl      MPV 9.8 fL      Platelets 278 10*3/mm3     Basic Metabolic Panel [[id number]]  (Abnormal) Collected: 03/11/25 1214    Specimen: Blood Updated: 03/11/25 1253     Glucose 145 mg/dL      BUN 29 mg/dL      Creatinine 1.31 mg/dL      Sodium 139 mmol/L      Potassium 5.0 mmol/L      Chloride  105 mmol/L      CO2 22.4 mmol/L      Calcium 8.2 mg/dL      BUN/Creatinine Ratio 22.1     Anion Gap 11.6 mmol/L      eGFR 54.3 mL/min/1.73     Narrative:      GFR Categories in Chronic Kidney Disease (CKD)      GFR Category          GFR (mL/min/1.73)    Interpretation  G1                     90 or greater         Normal or high (1)  G2                      60-89                Mild decrease (1)  G3a                   45-59                Mild to moderate decrease  G3b                   30-44                Moderate to severe decrease  G4                    15-29                Severe decrease  G5                    14 or less           Kidney failure          (1)In the absence of evidence of kidney disease, neither GFR category G1 or G2 fulfill the criteria for CKD.    eGFR calculation 2021 CKD-EPI creatinine equation, which does not include race as a factor    Magnesium [[id number]]  (Normal) Collected: 03/11/25 1214    Specimen: Blood Updated: 03/11/25 1253     Magnesium 1.9 mg/dL     Lactic Acid, Plasma [[id number]]  (Normal) Collected: 03/11/25 1214    Specimen: Blood Updated: 03/11/25 1250     Lactate 1.7 mmol/L     POC Glucose Once [[id number]]  (Abnormal) Collected: 03/11/25 0850    Specimen: Blood Updated: 03/11/25 0851     Glucose 142 mg/dL     Lactic Acid, Plasma [[id number]]  (Normal) Collected: 03/11/25 0735    Specimen: Blood Updated: 03/11/25 0807     Lactate 1.2 mmol/L     Basic Metabolic Panel [[id number]]  (Abnormal) Collected: 03/11/25 0617    Specimen: Blood Updated: 03/11/25 0707     Glucose 112 mg/dL      BUN 25 mg/dL      Creatinine 1.05 mg/dL      Sodium 138 mmol/L      Potassium 4.5 mmol/L      Chloride 102 mmol/L      CO2 25.3 mmol/L      Calcium 9.0 mg/dL      BUN/Creatinine Ratio 23.8     Anion Gap 10.7 mmol/L      eGFR 70.9 mL/min/1.73     Narrative:      GFR Categories in Chronic Kidney Disease (CKD)      GFR Category          GFR (mL/min/1.73)    Interpretation  G1                     90 or  "greater         Normal or high (1)  G2                      60-89                Mild decrease (1)  G3a                   45-59                Mild to moderate decrease  G3b                   30-44                Moderate to severe decrease  G4                    15-29                Severe decrease  G5                    14 or less           Kidney failure          (1)In the absence of evidence of kidney disease, neither GFR category G1 or G2 fulfill the criteria for CKD.    eGFR calculation 2021 CKD-EPI creatinine equation, which does not include race as a factor    CBC (No Diff) [[id number]]  (Abnormal) Collected: 03/11/25 0617    Specimen: Blood Updated: 03/11/25 0656     WBC 10.22 10*3/mm3      RBC 3.46 10*6/mm3      Hemoglobin 8.9 g/dL      Hematocrit 28.6 %      MCV 82.7 fL      MCH 25.7 pg      MCHC 31.1 g/dL      RDW 14.0 %      RDW-SD 41.8 fl      MPV 9.4 fL      Platelets 233 10*3/mm3     Hemoglobin & Hematocrit, Blood [[id number]]  (Abnormal) Collected: 03/11/25 0059    Specimen: Blood Updated: 03/11/25 0116     Hemoglobin 9.7 g/dL      Hematocrit 31.6 %     Procalcitonin [[id number]]  (Normal) Collected: 03/10/25 2306    Specimen: Blood Updated: 03/11/25 0005     Procalcitonin 0.18 ng/mL     Narrative:      As a Marker for Sepsis (Non-Neonates):    1. <0.5 ng/mL represents a low risk of severe sepsis and/or septic shock.  2. >2 ng/mL represents a high risk of severe sepsis and/or septic shock.    As a Marker for Lower Respiratory Tract Infections that require antibiotic therapy:    PCT on Admission    Antibiotic Therapy       6-12 Hrs later    >0.5                Strongly Recommended  >0.25 - <0.5        Recommended   0.1 - 0.25          Discouraged              Remeasure/reassess PCT  <0.1                Strongly Discouraged     Remeasure/reassess PCT    As 28 day mortality risk marker: \"Change in Procalcitonin Result\" (>80% or <=80%) if Day 0 (or Day 1) and Day 4 values are available. Refer to " http://www.[facility]-pct-calculator.com    Change in PCT <=80%  A decrease of PCT levels below or equal to 80% defines a positive change in PCT test result representing a higher risk for 28-day all-cause mortality of patients diagnosed with severe sepsis for septic shock.    Change in PCT >80%  A decrease of PCT levels of more than 80% defines a negative change in PCT result representing a lower risk for 28-day all-cause mortality of patients diagnosed with severe sepsis or septic shock.       Urinalysis With Microscopic If Indicated (No Culture) - Indwelling Urethral Catheter [[id number]] Updated: 03/10/25 2304    Specimen: Urine from Indwelling Urethral Catheter     STAT Lactic Acid, Reflex [[id number]]  (Normal) Collected: 03/10/25 2205    Specimen: Blood Updated: 03/10/25 2237     Lactate 1.6 mmol/L     Comprehensive Metabolic Panel [[id number]]  (Abnormal) Collected: 03/10/25 1743    Specimen: Blood from Arm, Right Updated: 03/10/25 1826     Glucose 131 mg/dL      BUN 27 mg/dL      Creatinine 1.23 mg/dL      Sodium 136 mmol/L      Potassium 4.9 mmol/L      Chloride 96 mmol/L      CO2 26.9 mmol/L      Calcium 10.9 mg/dL      Total Protein 7.8 g/dL      Albumin 3.7 g/dL      ALT (SGPT) 14 U/L      AST (SGOT) 21 U/L      Alkaline Phosphatase 63 U/L      Total Bilirubin 0.7 mg/dL      Globulin 4.1 gm/dL      A/G Ratio 0.9 g/dL      BUN/Creatinine Ratio 22.0     Anion Gap 13.1 mmol/L      eGFR 58.6 mL/min/1.73     Narrative:      GFR Categories in Chronic Kidney Disease (CKD)      GFR Category          GFR (mL/min/1.73)    Interpretation  G1                     90 or greater         Normal or high (1)  G2                      60-89                Mild decrease (1)  G3a                   45-59                Mild to moderate decrease  G3b                   30-44                Moderate to severe decrease  G4                    15-29                Severe decrease  G5                    14 or less           Kidney  failure          (1)In the absence of evidence of kidney disease, neither GFR category G1 or G2 fulfill the criteria for CKD.    eGFR calculation 2021 CKD-EPI creatinine equation, which does not include race as a factor    Lipase [[id number]]  (Abnormal) Collected: 03/10/25 1743    Specimen: Blood from Arm, Right Updated: 03/10/25 1826     Lipase 66 U/L     Lactic Acid, Plasma [[id number]]  (Abnormal) Collected: 03/10/25 1743    Specimen: Blood from Arm, Right Updated: 03/10/25 1823     Lactate 3.4 mmol/L     CBC & Differential [[id number]]  (Abnormal) Collected: 03/10/25 1743    Specimen: Blood from Arm, Right Updated: 03/10/25 1802    Narrative:      The following orders were created for panel order CBC & Differential.  Procedure                               Abnormality         Status                     ---------                               -----------         ------                     CBC Auto Differential[[id number]]        Abnormal            Final result                 Please view results for these tests on the individual orders.    CBC Auto Differential [[id number]]  (Abnormal) Collected: 03/10/25 1743    Specimen: Blood from Arm, Right Updated: 03/10/25 1802     WBC 8.83 10*3/mm3      RBC 4.50 10*6/mm3      Hemoglobin 11.2 g/dL      Hematocrit 36.9 %      MCV 82.0 fL      MCH 24.9 pg      MCHC 30.4 g/dL      RDW 13.5 %      RDW-SD 39.5 fl      MPV 9.3 fL      Platelets 260 10*3/mm3      Neutrophil % 81.3 %      Lymphocyte % 7.5 %      Monocyte % 10.6 %      Eosinophil % 0.0 %      Basophil % 0.1 %      Immature Grans % 0.5 %      Neutrophils, Absolute 7.18 10*3/mm3      Lymphocytes, Absolute 0.66 10*3/mm3      Monocytes, Absolute 0.94 10*3/mm3      Eosinophils, Absolute 0.00 10*3/mm3      Basophils, Absolute 0.01 10*3/mm3      Immature Grans, Absolute 0.04 10*3/mm3      nRBC 0.0 /100 WBC     [place] Draw [[id number]] Collected: 03/10/25 1743    Specimen: Blood from Arm, Right Updated: 03/10/25 1800     Narrative:      The following orders were created for panel order [place] Draw.  Procedure                               Abnormality         Status                     ---------                               -----------         ------                     Green Top (Gel)[[id number]]                                  Final result               Lavender Top[[id number]]                                     Final result               Gold Top - SST[[id number]]                                   Final result               Light Blue Top[[id number]]                                   Final result                 Please view results for these tests on the individual orders.    Green Top (Gel) [[id number]] Collected: 03/10/25 1743    Specimen: Blood from Arm, Right Updated: 03/10/25 1800     Extra Tube Hold for add-ons.     Comment: Auto resulted.       Lavender Top [[id number]] Collected: 03/10/25 1743    Specimen: Blood from Arm, Right Updated: 03/10/25 1800     Extra Tube hold for add-on     Comment: Auto resulted       Gold Top - SST [[id number]] Collected: 03/10/25 1743    Specimen: Blood from Arm, Right Updated: 03/10/25 1800     Extra Tube Hold for add-ons.     Comment: Auto resulted.       Light Blue Top [[id number]] Collected: 03/10/25 1743    Specimen: Blood from Arm, Right Updated: 03/10/25 1800     Extra Tube Hold for add-ons.     Comment: Auto resulted               Imaging Results (All)       Procedure Component Value Units Date/Time    FL Retrograde Pyelogram In OR [[id number]] Collected: 03/12/25 0652     Updated: 03/12/25 0657    Narrative:      PORTABLE RETROGRADE PYELOGRAM     HISTORY: Very large suspicious right renal mass noted on CT scan  performed 2 days ago. Multiple large left renal stones. Enlarged  prostate.     A single image in the operating room demonstrates a large stone in the  upper portion of the left renal pelvis measuring up to 2.1 cm as also  noted on the CT scan. There is a Ayala catheter within the  bladder and  the operative report describes inability to exchange the catheter are  perform retrograde evaluation.     1 image was obtained and the fluoroscopy time measures 6 seconds. The  dose Kerma measures 0.3 mGy.     This report was finalized on 3/12/2025 6:54 AM by Dr. Prudencio Mays M.D  on Workstation: [id number]       XR Chest Post CVA Port [[id number]] Collected: 03/11/25 1027     Updated: 03/11/25 1033    Narrative:      XR CHEST POST CVA PORT-     INDICATIONS: Central line placement     TECHNIQUE: FRONTAL VIEWS OF THE CHEST     COMPARISON: 8/2/2022     FINDINGS:     Right IJ catheter extends to the superior vena cava. Left-sided  pacemaker and cardiac leads are seen. The heart size is normal.  Pulmonary vasculature is unremarkable. No focal pulmonary consolidation,  pleural effusion, or pneumothorax. Old granulomatous disease is  apparent. No acute osseous process.       Impression:         Central line placement. No pneumothorax.           This report was finalized on 3/11/2025 10:30 AM by Dr. Hayden Cooney M.D on Workstation: [id number]       CT Abdomen Pelvis Without Contrast [[id number]] Collected: 03/10/25 2156     Updated: 03/10/25 2206    Narrative:      CT ABDOMEN AND PELVIS WITHOUT CONTRAST:     HISTORY: Lower abdominal pain with hematuria and difficulty urinating,  history of kidney stones, history of renal neoplasm     TECHNIQUE: Helical CT was performed of the abdomen and pelvis from the  lung bases through the lesser trochanters without IV contrast.  Reformatted images were provided in the coronal and sagittal planes.  Radiation dose reduction techniques were utilized, including automated  exposure control, and exposure modulation based on body size.     COMPARISON: None available.     FINDINGS:     Lung bases: No acute findings, elevation of the right hemidiaphragm.     Abdomen: The liver is unremarkable. There is cholelithiasis, but no CT  evidence of cholecystitis or biliary  obstruction. The spleen and  pancreas appear normal. No evidence of acute abnormality in either  adrenal gland. Left kidney demonstrates multiple calculi, including an  up to 2.3 cm developing staghorn calculus. There is no hydronephrosis or  ureterolithiasis. There is a very large right renal lower pole  solid-appearing mass, about 9 cm in overall size. Hyperdense material in  the right renal collecting system and ureter may represent blood.     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 There is no suspicious  retroperitoneal adenopathy.      There is no evidence of bowel obstruction. There is an up to 3.6 cm  abdominal aortic aneurysm.     Pelvis: There is a Ayala catheter in place and there is prostatic  hypertrophy, but there appears to be blood within the urinary bladder as  well. There is no pelvic inflammatory change or other abnormal fluid  collection. There is extensive sigmoid diverticulosis but no CT evidence  of diverticulitis. There is no CT evidence of hernia or bowel  obstruction.     There are spinal degenerative changes, but there is no acute bony  abnormality.       Impression:         Large right renal mass without hydronephrosis no dense material in the  right renal collecting system, ureter and urinary bladder likely  represent hematuria.     Multiple left renal calculi without hydronephrosis or ureterolithiasis.     Area of hyperdensity within the inferior vena cava adjacent to the renal  midpole question caval tumor thrombus.     3.6 cm abdominal aortic aneurysm.     Extensive sigmoid diverticulosis without evidence of diverticulitis.              This report was finalized on 3/10/2025 10:03 PM by Dr. Vijay Stinson M.D on Workstation: [id number]               Medication Review:   Current Facility-Administered Medications   Medication Dose Route Frequency Provider Last Rate Last Admin    acetaminophen  (TYLENOL) tablet 650 mg  650 mg Oral Q4H PRN Chance Reyes MD        albuterol (PROVENTIL) nebulizer solution 0.083% 2.5 mg/3mL  2.5 mg Nebulization Q4H PRN Chance Reyes MD        sennosides-docusate (PERICOLACE) 8.6-50 MG per tablet 2 tablet  2 tablet Oral BID Chance Reyes MD   2 tablet at 03/11/25 2021    And    polyethylene glycol (MIRALAX) packet 17 g  17 g Oral Daily PRN Chance Reyes MD        And    bisacodyl (DULCOLAX) EC tablet 5 mg  5 mg Oral Daily PRN Chance Reyes MD        And    bisacodyl (DULCOLAX) suppository 10 mg  10 mg Rectal Daily PRN Chance Reyes MD        budesonide (PULMICORT) nebulizer solution 1 mg  1 mg Nebulization BID Chance Reyes MD   1 mg at 03/11/25 2008    lactated ringers infusion  9 mL/hr Intravenous Continuous Fei Ogden MD   Stopped at 03/12/25 0234    levothyroxine (SYNTHROID, LEVOTHROID) tablet 125 mcg  125 mcg Oral Daily Chance Reyes MD   125 mcg at 03/11/25 1411    morphine injection 4 mg  4 mg Intravenous Once PRN Chance Reyes MD        mupirocin (BACTROBAN) 2 % nasal ointment 1 Application  1 Application Each Nare BID Chance Reyes MD   1 Application at 03/11/25 2021    nitroglycerin (NITROSTAT) SL tablet 0.4 mg  0.4 mg Sublingual Q5 Min PRN Chance Reyes MD        norepinephrine (LEVOPHED) 8 mg in 250 mL NS infusion (premix)  0.02-0.3 mcg/kg/min Intravenous Titrated Chance Reyes MD 28.1 mL/hr at 03/12/25 0329 0.22 mcg/kg/min at 03/12/25 0329    ondansetron ODT (ZOFRAN-ODT) disintegrating tablet 4 mg  4 mg Oral Q6H PRN Chance Reyes MD        Or    ondansetron (ZOFRAN) injection 4 mg  4 mg Intravenous Q6H PRN Chance Reyes MD   4 mg at 03/11/25 0759    ondansetron (ZOFRAN) injection 8 mg  8 mg Intravenous Once PRN Chance Reyes MD        piperacillin-tazobactam (ZOSYN) 4.5 g IVPB in 100 mL NS MBP (CD)  4.5 g Intravenous Q8H Chance Reyes MD   4.5 g at 03/12/25  0335    rosuvastatin (CRESTOR) tablet 40 mg  40 mg Oral Nightly Chance Reyes MD   40 mg at 03/11/25 2021    sodium chloride 0.9 % flush 10 mL  10 mL Intravenous PRN Chance Reyes MD        sodium chloride 0.9 % flush 10 mL  10 mL Intravenous PRN Chance Reyes MD        sodium chloride 0.9 % flush 10 mL  10 mL Intravenous Q12H Chance Reyes MD   10 mL at 03/11/25 2021    sodium chloride 0.9 % flush 10 mL  10 mL Intravenous PRN Chance Reyes MD        sodium chloride 0.9 % infusion 40 mL  40 mL Intravenous PRN Chance Reyes MD        sodium chloride 0.9 % infusion  150 mL/hr Intravenous Continuous Dony Spears,  mL/hr at 03/12/25 0703 150 mL/hr at 03/12/25 0703       Current Facility-Administered Medications:     acetaminophen (TYLENOL) tablet 650 mg, 650 mg, Oral, Q4H PRN, Chance Reyes MD    albuterol (PROVENTIL) nebulizer solution 0.083% 2.5 mg/3mL, 2.5 mg, Nebulization, Q4H PRN, Chance Reyes MD    sennosides-docusate (PERICOLACE) 8.6-50 MG per tablet 2 tablet, 2 tablet, Oral, BID, 2 tablet at 03/11/25 2021 **AND** polyethylene glycol (MIRALAX) packet 17 g, 17 g, Oral, Daily PRN **AND** bisacodyl (DULCOLAX) EC tablet 5 mg, 5 mg, Oral, Daily PRN **AND** bisacodyl (DULCOLAX) suppository 10 mg, 10 mg, Rectal, Daily PRN, Chance Reyes MD    budesonide (PULMICORT) nebulizer solution 1 mg, 1 mg, Nebulization, BID, Chance Reyes MD, 1 mg at 03/11/25 2008    lactated ringers infusion, 9 mL/hr, Intravenous, Continuous, Fei Ogden MD, Stopped at 03/12/25 0234    levothyroxine (SYNTHROID, LEVOTHROID) tablet 125 mcg, 125 mcg, Oral, Daily, Chance Reyes MD, 125 mcg at 03/11/25 1411    morphine injection 4 mg, 4 mg, Intravenous, Once PRN, Chance Reyes MD    mupirocin (BACTROBAN) 2 % nasal ointment 1 Application, 1 Application, Each Nare, BID, Chanec Reyes MD, 1 Application at 03/11/25 2021    nitroglycerin (NITROSTAT) SL  tablet 0.4 mg, 0.4 mg, Sublingual, Q5 Min PRN, Chance Reyes MD    norepinephrine (LEVOPHED) 8 mg in 250 mL NS infusion (premix), 0.02-0.3 mcg/kg/min, Intravenous, Titrated, Chance Reyes MD, Last Rate: 28.1 mL/hr at 03/12/25 0329, 0.22 mcg/kg/min at 03/12/25 0329    ondansetron ODT (ZOFRAN-ODT) disintegrating tablet 4 mg, 4 mg, Oral, Q6H PRN **OR** ondansetron (ZOFRAN) injection 4 mg, 4 mg, Intravenous, Q6H PRN, Chance Reyes MD, 4 mg at 03/11/25 0759    ondansetron (ZOFRAN) injection 8 mg, 8 mg, Intravenous, Once PRN, Chance Reyes MD    piperacillin-tazobactam (ZOSYN) 4.5 g IVPB in 100 mL NS MBP (CD), 4.5 g, Intravenous, Q8H, Chance Reyes MD, 4.5 g at 03/12/25 0335    rosuvastatin (CRESTOR) tablet 40 mg, 40 mg, Oral, Nightly, Chance Reyes MD, 40 mg at 03/11/25 2021    sodium chloride 0.9 % flush 10 mL, 10 mL, Intravenous, PRN, Chance Reyes MD    sodium chloride 0.9 % flush 10 mL, 10 mL, Intravenous, PRNAmy Jonathan L, MD    sodium chloride 0.9 % flush 10 mL, 10 mL, Intravenous, Q12H, Chance Reyes MD, 10 mL at 03/11/25 2021    sodium chloride 0.9 % flush 10 mL, 10 mL, Intravenous, PRNAmy Jonathan L, MD    sodium chloride 0.9 % infusion 40 mL, 40 mL, Intravenous, PRN, Chance Reyes MD    sodium chloride 0.9 % infusion, 150 mL/hr, Intravenous, Continuous, Dony Spears DO, Last Rate: 150 mL/hr at 03/12/25 0703, 150 mL/hr at 03/12/25 0703  Medications Discontinued During This Encounter   Medication Reason    sodium chloride 0.9 % infusion     nitroglycerin (NITROSTAT) SL tablet 0.4 mg     sennosides-docusate (PERICOLACE) 8.6-50 MG per tablet 2 tablet     polyethylene glycol (MIRALAX) packet 17 g     bisacodyl (DULCOLAX) EC tablet 5 mg     bisacodyl (DULCOLAX) suppository 10 mg     Pharmacy To Dose: Piperacillin-tazobactam (Zosyn)     sodium chloride 0.9 % flush 3 mL Patient Transfer    sodium chloride 0.9 % flush 3-10 mL Patient Transfer     lidocaine (XYLOCAINE) 1 % injection 0.5 mL Patient Transfer    fentaNYL citrate (PF) (SUBLIMAZE) injection 50 mcg Patient Transfer    midazolam (VERSED) injection 0.5 mg Patient Transfer    famotidine (PEPCID) injection 20 mg Patient Transfer    sodium chloride (NS) irrigation solution Patient Discharge    HYDROcodone-acetaminophen (NORCO) 5-325 MG per tablet 1 tablet Patient Transfer    HYDROmorphone (DILAUDID) injection 0.25 mg Patient Transfer    HYDROcodone-acetaminophen (NORCO) 7.5-325 MG per tablet 1 tablet Patient Transfer    fentaNYL citrate (PF) (SUBLIMAZE) injection 25 mcg Patient Transfer    naloxone (NARCAN) injection 0.2 mg Patient Transfer    flumazenil (ROMAZICON) injection 0.2 mg Patient Transfer    ondansetron (ZOFRAN) injection 4 mg Patient Transfer    labetalol (NORMODYNE,TRANDATE) injection 5 mg Patient Transfer    hydrALAZINE (APRESOLINE) injection 5 mg Patient Transfer    ePHEDrine injection 5 mg Patient Transfer    Atropine Sulfate (PF) injection 0.4 mg Patient Transfer    ipratropium-albuterol (DUO-NEB) nebulizer solution 3 mL Patient Transfer    acetaminophen (TYLENOL) tablet 650 mg Patient Transfer       Assessment & Plan         Hematuria    COPD (chronic obstructive pulmonary disease)    Hypertension    Coronary artery disease    CHF (congestive heart failure)    Renal mass    Presence of Watchman left atrial appendage closure device        Plan   - recommend MRI abdomen with and without to further evaluate possible IVC thrombus.   - safe to advance diet otherwise from urology standpoint.    Shayne Clemente Jr., MD  03/12/25  07:41 EDT

## 2025-03-13 ENCOUNTER — APPOINTMENT (OUTPATIENT)
Dept: GENERAL RADIOLOGY | Facility: HOSPITAL | Age: 85
End: 2025-03-13
Payer: MEDICARE

## 2025-03-13 ENCOUNTER — APPOINTMENT (OUTPATIENT)
Dept: MRI IMAGING | Facility: HOSPITAL | Age: 85
End: 2025-03-13
Payer: MEDICARE

## 2025-03-13 PROBLEM — R57.8 HEMORRHAGIC SHOCK: Status: ACTIVE | Noted: 2025-03-13

## 2025-03-13 LAB
ALBUMIN SERPL-MCNC: 2.4 G/DL (ref 3.5–5.2)
ALBUMIN/GLOB SERPL: 1.1 G/DL
ALP SERPL-CCNC: 40 U/L (ref 39–117)
ALT SERPL W P-5'-P-CCNC: 9 U/L (ref 1–41)
ANION GAP SERPL CALCULATED.3IONS-SCNC: 7 MMOL/L (ref 5–15)
AST SERPL-CCNC: 20 U/L (ref 1–40)
BASOPHILS # BLD AUTO: 0.01 10*3/MM3 (ref 0–0.2)
BASOPHILS # BLD AUTO: 0.01 10*3/MM3 (ref 0–0.2)
BASOPHILS NFR BLD AUTO: 0.1 % (ref 0–1.5)
BASOPHILS NFR BLD AUTO: 0.1 % (ref 0–1.5)
BH BB BLOOD EXPIRATION DATE: NORMAL
BH BB BLOOD TYPE BARCODE: 600
BH BB DISPENSE STATUS: NORMAL
BH BB PRODUCT CODE: NORMAL
BH BB UNIT NUMBER: NORMAL
BILIRUB SERPL-MCNC: 0.7 MG/DL (ref 0–1.2)
BUN SERPL-MCNC: 14 MG/DL (ref 8–23)
BUN/CREAT SERPL: 17.3 (ref 7–25)
CALCIUM SPEC-SCNC: 8 MG/DL (ref 8.6–10.5)
CHLORIDE SERPL-SCNC: 115 MMOL/L (ref 98–107)
CO2 SERPL-SCNC: 22 MMOL/L (ref 22–29)
CREAT SERPL-MCNC: 0.81 MG/DL (ref 0.76–1.27)
CROSSMATCH INTERPRETATION: NORMAL
DEPRECATED RDW RBC AUTO: 43.6 FL (ref 37–54)
DEPRECATED RDW RBC AUTO: 45.7 FL (ref 37–54)
EGFRCR SERPLBLD CKD-EPI 2021: 88 ML/MIN/1.73
EOSINOPHIL # BLD AUTO: 0 10*3/MM3 (ref 0–0.4)
EOSINOPHIL # BLD AUTO: 0 10*3/MM3 (ref 0–0.4)
EOSINOPHIL NFR BLD AUTO: 0 % (ref 0.3–6.2)
EOSINOPHIL NFR BLD AUTO: 0 % (ref 0.3–6.2)
ERYTHROCYTE [DISTWIDTH] IN BLOOD BY AUTOMATED COUNT: 14.1 % (ref 12.3–15.4)
ERYTHROCYTE [DISTWIDTH] IN BLOOD BY AUTOMATED COUNT: 14.5 % (ref 12.3–15.4)
GLOBULIN UR ELPH-MCNC: 2.2 GM/DL
GLUCOSE SERPL-MCNC: 122 MG/DL (ref 65–99)
HCT VFR BLD AUTO: 23.4 % (ref 37.5–51)
HCT VFR BLD AUTO: 27.4 % (ref 37.5–51)
HGB BLD-MCNC: 7.8 G/DL (ref 13–17.7)
HGB BLD-MCNC: 8.7 G/DL (ref 13–17.7)
IMM GRANULOCYTES # BLD AUTO: 0.04 10*3/MM3 (ref 0–0.05)
IMM GRANULOCYTES # BLD AUTO: 0.08 10*3/MM3 (ref 0–0.05)
IMM GRANULOCYTES NFR BLD AUTO: 0.5 % (ref 0–0.5)
IMM GRANULOCYTES NFR BLD AUTO: 0.9 % (ref 0–0.5)
LYMPHOCYTES # BLD AUTO: 0.66 10*3/MM3 (ref 0.7–3.1)
LYMPHOCYTES # BLD AUTO: 0.93 10*3/MM3 (ref 0.7–3.1)
LYMPHOCYTES NFR BLD AUTO: 10 % (ref 19.6–45.3)
LYMPHOCYTES NFR BLD AUTO: 7.6 % (ref 19.6–45.3)
MAGNESIUM SERPL-MCNC: 2.2 MG/DL (ref 1.6–2.4)
MCH RBC QN AUTO: 27.9 PG (ref 26.6–33)
MCH RBC QN AUTO: 28.7 PG (ref 26.6–33)
MCHC RBC AUTO-ENTMCNC: 31.8 G/DL (ref 31.5–35.7)
MCHC RBC AUTO-ENTMCNC: 33.3 G/DL (ref 31.5–35.7)
MCV RBC AUTO: 86 FL (ref 79–97)
MCV RBC AUTO: 87.8 FL (ref 79–97)
MONOCYTES # BLD AUTO: 1.38 10*3/MM3 (ref 0.1–0.9)
MONOCYTES # BLD AUTO: 1.52 10*3/MM3 (ref 0.1–0.9)
MONOCYTES NFR BLD AUTO: 15.9 % (ref 5–12)
MONOCYTES NFR BLD AUTO: 16.4 % (ref 5–12)
NEUTROPHILS NFR BLD AUTO: 6.61 10*3/MM3 (ref 1.7–7)
NEUTROPHILS NFR BLD AUTO: 6.73 10*3/MM3 (ref 1.7–7)
NEUTROPHILS NFR BLD AUTO: 72.6 % (ref 42.7–76)
NEUTROPHILS NFR BLD AUTO: 75.9 % (ref 42.7–76)
NRBC BLD AUTO-RTO: 0 /100 WBC (ref 0–0.2)
NRBC BLD AUTO-RTO: 0 /100 WBC (ref 0–0.2)
PHOSPHATE SERPL-MCNC: 1.3 MG/DL (ref 2.5–4.5)
PHOSPHATE SERPL-MCNC: 1.5 MG/DL (ref 2.5–4.5)
PLATELET # BLD AUTO: 128 10*3/MM3 (ref 140–450)
PLATELET # BLD AUTO: 159 10*3/MM3 (ref 140–450)
PMV BLD AUTO: 9.2 FL (ref 6–12)
PMV BLD AUTO: 9.3 FL (ref 6–12)
POTASSIUM SERPL-SCNC: 4 MMOL/L (ref 3.5–5.2)
PROT SERPL-MCNC: 4.6 G/DL (ref 6–8.5)
RBC # BLD AUTO: 2.72 10*6/MM3 (ref 4.14–5.8)
RBC # BLD AUTO: 3.12 10*6/MM3 (ref 4.14–5.8)
SODIUM SERPL-SCNC: 144 MMOL/L (ref 136–145)
UNIT  ABO: NORMAL
UNIT  RH: NORMAL
WBC NRBC COR # BLD AUTO: 8.7 10*3/MM3 (ref 3.4–10.8)
WBC NRBC COR # BLD AUTO: 9.27 10*3/MM3 (ref 3.4–10.8)

## 2025-03-13 PROCEDURE — 94664 DEMO&/EVAL PT USE INHALER: CPT

## 2025-03-13 PROCEDURE — 94799 UNLISTED PULMONARY SVC/PX: CPT

## 2025-03-13 PROCEDURE — 94761 N-INVAS EAR/PLS OXIMETRY MLT: CPT

## 2025-03-13 PROCEDURE — 84100 ASSAY OF PHOSPHORUS: CPT | Performed by: UROLOGY

## 2025-03-13 PROCEDURE — 86923 COMPATIBILITY TEST ELECTRIC: CPT

## 2025-03-13 PROCEDURE — 25010000002 MORPHINE PER 10 MG: Performed by: UROLOGY

## 2025-03-13 PROCEDURE — 85025 COMPLETE CBC W/AUTO DIFF WBC: CPT | Performed by: UROLOGY

## 2025-03-13 PROCEDURE — 86900 BLOOD TYPING SEROLOGIC ABO: CPT

## 2025-03-13 PROCEDURE — 80053 COMPREHEN METABOLIC PANEL: CPT | Performed by: UROLOGY

## 2025-03-13 PROCEDURE — 25010000002 PIPERACILLIN SOD-TAZOBACTAM PER 1 G: Performed by: UROLOGY

## 2025-03-13 PROCEDURE — 99232 SBSQ HOSP IP/OBS MODERATE 35: CPT | Performed by: INTERNAL MEDICINE

## 2025-03-13 PROCEDURE — 36430 TRANSFUSION BLD/BLD COMPNT: CPT

## 2025-03-13 PROCEDURE — 84100 ASSAY OF PHOSPHORUS: CPT | Performed by: HOSPITALIST

## 2025-03-13 PROCEDURE — P9016 RBC LEUKOCYTES REDUCED: HCPCS

## 2025-03-13 PROCEDURE — 71045 X-RAY EXAM CHEST 1 VIEW: CPT

## 2025-03-13 PROCEDURE — 83735 ASSAY OF MAGNESIUM: CPT | Performed by: UROLOGY

## 2025-03-13 PROCEDURE — 94760 N-INVAS EAR/PLS OXIMETRY 1: CPT

## 2025-03-13 RX ORDER — IPRATROPIUM BROMIDE AND ALBUTEROL SULFATE 2.5; .5 MG/3ML; MG/3ML
3 SOLUTION RESPIRATORY (INHALATION) 4 TIMES DAILY
Status: DISCONTINUED | OUTPATIENT
Start: 2025-03-13 | End: 2025-03-15

## 2025-03-13 RX ORDER — IPRATROPIUM BROMIDE AND ALBUTEROL SULFATE 2.5; .5 MG/3ML; MG/3ML
3 SOLUTION RESPIRATORY (INHALATION) EVERY 4 HOURS PRN
Status: DISCONTINUED | OUTPATIENT
Start: 2025-03-13 | End: 2025-03-13

## 2025-03-13 RX ORDER — ARFORMOTEROL TARTRATE 15 UG/2ML
15 SOLUTION RESPIRATORY (INHALATION)
Status: DISCONTINUED | OUTPATIENT
Start: 2025-03-13 | End: 2025-03-19 | Stop reason: HOSPADM

## 2025-03-13 RX ADMIN — Medication 2 PACKET: at 17:39

## 2025-03-13 RX ADMIN — Medication 10 ML: at 22:33

## 2025-03-13 RX ADMIN — LEVOTHYROXINE SODIUM 125 MCG: 125 TABLET ORAL at 08:53

## 2025-03-13 RX ADMIN — MUPIROCIN 1 APPLICATION: 20 OINTMENT TOPICAL at 08:52

## 2025-03-13 RX ADMIN — ARFORMOTEROL TARTRATE 15 MCG: 15 SOLUTION RESPIRATORY (INHALATION) at 20:16

## 2025-03-13 RX ADMIN — ALBUTEROL SULFATE 2.5 MG: 2.5 SOLUTION RESPIRATORY (INHALATION) at 06:38

## 2025-03-13 RX ADMIN — Medication 10 ML: at 09:00

## 2025-03-13 RX ADMIN — PIPERACILLIN SODIUM AND TAZOBACTAM SODIUM 4.5 G: 4; .5 INJECTION, POWDER, LYOPHILIZED, FOR SOLUTION INTRAVENOUS at 11:00

## 2025-03-13 RX ADMIN — FAMOTIDINE 20 MG: 20 TABLET, FILM COATED ORAL at 08:52

## 2025-03-13 RX ADMIN — IPRATROPIUM BROMIDE AND ALBUTEROL SULFATE 3 ML: .5; 3 SOLUTION RESPIRATORY (INHALATION) at 15:03

## 2025-03-13 RX ADMIN — PIPERACILLIN SODIUM AND TAZOBACTAM SODIUM 4.5 G: 4; .5 INJECTION, POWDER, LYOPHILIZED, FOR SOLUTION INTRAVENOUS at 18:46

## 2025-03-13 RX ADMIN — IPRATROPIUM BROMIDE AND ALBUTEROL SULFATE 3 ML: .5; 3 SOLUTION RESPIRATORY (INHALATION) at 10:58

## 2025-03-13 RX ADMIN — ROSUVASTATIN CALCIUM 40 MG: 20 TABLET, FILM COATED ORAL at 22:32

## 2025-03-13 RX ADMIN — FAMOTIDINE 20 MG: 20 TABLET, FILM COATED ORAL at 17:39

## 2025-03-13 RX ADMIN — MUPIROCIN 1 APPLICATION: 20 OINTMENT TOPICAL at 22:33

## 2025-03-13 RX ADMIN — Medication 2 PACKET: at 08:52

## 2025-03-13 RX ADMIN — BUDESONIDE 1 MG: 1 INHALANT ORAL at 20:23

## 2025-03-13 RX ADMIN — IPRATROPIUM BROMIDE AND ALBUTEROL SULFATE 3 ML: .5; 3 SOLUTION RESPIRATORY (INHALATION) at 20:12

## 2025-03-13 RX ADMIN — MORPHINE SULFATE 4 MG: 2 INJECTION, SOLUTION INTRAMUSCULAR; INTRAVENOUS at 00:50

## 2025-03-13 RX ADMIN — PIPERACILLIN SODIUM AND TAZOBACTAM SODIUM 4.5 G: 4; .5 INJECTION, POWDER, LYOPHILIZED, FOR SOLUTION INTRAVENOUS at 03:04

## 2025-03-13 RX ADMIN — NOREPINEPHRINE BITARTRATE 0.16 MCG/KG/MIN: 0.03 INJECTION, SOLUTION INTRAVENOUS at 08:54

## 2025-03-13 RX ADMIN — BUDESONIDE 1 MG: 1 INHALANT ORAL at 06:38

## 2025-03-13 RX ADMIN — ARFORMOTEROL TARTRATE 15 MCG: 15 SOLUTION RESPIRATORY (INHALATION) at 10:53

## 2025-03-13 NOTE — PROGRESS NOTES
[facility]  [phone]  Dr. Dony Spears     Subjective:  LOS: 2    Chief Complaint: Hematuria    Patient doing well.  Could not tolerate MRI today.    Objective   Vital Signs past 24hrs  Temp range: Temp (24hrs), Av.5 °F (36.4 °C), Min:96.9 °F (36.1 °C), Max:97.9 °F (36.6 °C)    BP range: BP: ()/(39-96) 120/70  Pulse range: Heart Rate:  [] 92  Resp rate range: Resp:  [18-29] 20  Device (Oxygen Therapy): nasal cannulaFlow (L/min) (Oxygen Therapy):  [3] 3  Oxygen range:SpO2:  [87 %-99 %] 93 %   Mechanical Ventilator:     Physical Exam  Vitals and nursing note reviewed.   Constitutional:       General: He is not in acute distress.  HENT:      Head: Normocephalic and atraumatic.   Cardiovascular:      Rate and Rhythm: Normal rate and regular rhythm.      Heart sounds: No murmur heard.  Pulmonary:      Effort: Pulmonary effort is normal. No respiratory distress.      Breath sounds: Normal breath sounds. No wheezing, rhonchi or rales.   Abdominal:      General: Abdomen is flat.      Tenderness: There is no abdominal tenderness.   Skin:     General: Skin is warm and dry.      Findings: No rash.   Neurological:      Mental Status: He is alert.       Results Review:    I have reviewed the laboratory and imaging data since the last note by [facility] physician.  My annotations are noted in assessment and plan.      Result Review:  I have personally reviewed the results from last note by [facility] physician to 3/13/2025 17:56 EDT and agree with these findings:  [x]  Laboratory list / accordion  [x]  Microbiology  [x]  Radiology  [x]  EKG/Telemetry   [x]  Cardiology/Vascular   [x]  Pathology  [x]  Old records  []  Other:    Medication Review:  I have reviewed the current MAR.  My annotations are noted in assessment and plan.    arformoterol, 15 mcg, Nebulization, BID - RT  budesonide, 1 mg, Nebulization, BID  famotidine, 20 mg, Oral, BID AC  ipratropium-albuterol, 3 mL, Nebulization, 4x  Daily  levothyroxine, 125 mcg, Oral, Daily  mupirocin, 1 Application, Each Nare, BID  piperacillin-tazobactam, 4.5 g, Intravenous, Q8H  rosuvastatin, 40 mg, Oral, Nightly  senna-docusate sodium, 2 tablet, Oral, BID  sodium chloride, 10 mL, Intravenous, Q12H  sodium chloride, 10 mL, Intravenous, Q12H  sodium chloride, 10 mL, Intravenous, Q12H  sodium chloride, 10 mL, Intravenous, Q12H        norepinephrine, 0.02-0.3 mcg/kg/min, Last Rate: 0.08 mcg/kg/min (03/13/25 1551)      Lines, Drains & Airways       Active LDAs       Name Placement date Placement time Site Days    CVC Triple Lumen 03/11/25 Right Internal jugular 03/11/25  0945  Internal jugular  2    Peripheral IV 03/10/25 2307 Right Antecubital 03/10/25  2307  Antecubital  2    Continuous Bladder Irrigation Triple-lumen 24 Fr 03/12/25  0232  Triple-lumen  1                  No active isolations  Diet Orders (active) (From admission, onward)       Start     Ordered    03/13/25 1200  Dietary Nutrition Supplements Boost Glucose Control (Glucerna Shake); chocolate  Daily With Breakfast & Lunch       03/13/25 1146    03/12/25 0911  Diet: Cardiac; Healthy Heart (2-3 Na+); Fluid Consistency: Thin (IDDSI 0)  Diet Effective Now         03/12/25 0910                      Assessment  Shock-likely hemorrhagic cannot rule out septic  Acute blood loss anemia  Hematuria  Right renal mass  Left nephrolithiasis  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  Known history of right renal mass and recurrent hematuria.  Had recent Watchman device placed for A-fib on 2/24/2025 so he could stop anticoagulation and was subsequently placed on DAPT for 6 months.  Had  rapid response on the floor due to hypotension and blood loss.  - Continue holding home ASA and Plavix  - Transfusing blood and platelets as indicated  - Wean vasopressors for goal MAP greater than 65  - Continue empiric antibiotics for sepsis  - Urology consulted and following regarding hematuria and right renal mass.  Went for cystoscopy on morning of 3/12/2025 where he was found to have a large posterior membranous/prostatic urethral false passage therefore the catheter was repositioned into the bladder and CBI was resumed.  - MRI abdomen ordered to further evaluate mass and tumor thrombus.  Patient unable to tolerate today.  Will attempt again soon.  - Cardiology consulted given need to hold DAPT  - ICU core measures     Critical care time 35 minutes      Dony Spears DO   03/13/25  17:56 EDT      Part of this note may be an electronic transcription/translation of spoken language to printed text using the Dragon Dictation System.

## 2025-03-13 NOTE — PROGRESS NOTES
LOS: 2 days   Patient Care Team:  German Mccray MD as PCP - General (Internal Medicine)  Alicia Pickens MD as Consulting Physician (Interventional Cardiology)  Armani Siddiqui MD as Consulting Physician (Pulmonary Disease)    Chief Complaint:     Follow-up Watchman    Interval History:     He feels more short winded.  He has no chest pain he does not feels heart racing or skipping.    Objective   Vital Signs  Temp:  [96.9 °F (36.1 °C)-98.3 °F (36.8 °C)] 97 °F (36.1 °C)  Heart Rate:  [] 93  Resp:  [18-33] 18  BP: ()/(39-96) 95/54    Intake/Output Summary (Last 24 hours) at 3/13/2025 0925  Last data filed at 3/13/2025 0857  Gross per 24 hour   Intake 2726.3 ml   Output 1800 ml   Net 926.3 ml       Comfortable NAD  Neck supple, no JVD or thyromegaly appreciated  S1/S2 irregular, rate controlled no m/r/g  Lungs decreased throughout normal effort  Abdomen S/NT/ND (+) BS, no HSM appreciated  Extremities warm, no clubbing, cyanosis, 1+ lower extremity edema  No visible or palpable skin lesions  A/Ox4, mood and affect appropriate    Results Review:      Results from last 7 days   Lab Units 03/13/25  0437 03/12/25  0541 03/11/25  1214   SODIUM mmol/L 144 142 139   POTASSIUM mmol/L 4.0 4.4 5.0   CHLORIDE mmol/L 115* 111* 105   CO2 mmol/L 22.0 19.9* 22.4   BUN mg/dL 14 24* 29*   CREATININE mg/dL 0.81 0.98 1.31*   GLUCOSE mg/dL 122* 146* 145*   CALCIUM mg/dL 8.0* 7.7* 8.2*         Results from last 7 days   Lab Units 03/13/25  0437 03/12/25  2152 03/12/25  1546   WBC 10*3/mm3 9.27 8.44 10.21   HEMOGLOBIN g/dL 7.8* 6.6* 7.2*   HEMATOCRIT % 23.4* 21.0* 22.7*   PLATELETS 10*3/mm3 159 144 166             Results from last 7 days   Lab Units 03/13/25  0437   MAGNESIUM mg/dL 2.2           I reviewed the patient's new clinical results.  I personally viewed and interpreted the patient's EKG/Telemetry data        Medication Review:   budesonide, 1 mg, Nebulization, BID  famotidine, 20 mg, Oral, BID  AC  levothyroxine, 125 mcg, Oral, Daily  mupirocin, 1 Application, Each Nare, BID  piperacillin-tazobactam, 4.5 g, Intravenous, Q8H  rosuvastatin, 40 mg, Oral, Nightly  senna-docusate sodium, 2 tablet, Oral, BID  sodium chloride, 10 mL, Intravenous, Q12H  sodium chloride, 10 mL, Intravenous, Q12H  sodium chloride, 10 mL, Intravenous, Q12H  sodium chloride, 10 mL, Intravenous, Q12H        norepinephrine, 0.02-0.3 mcg/kg/min, Last Rate: 0.16 mcg/kg/min (03/13/25 0854)        Assessment & Plan       Hematuria    COPD (chronic obstructive pulmonary disease)    Hypertension    Coronary artery disease    CHF (congestive heart failure)    Renal mass    Presence of Watchman left atrial appendage closure device        Right renal mass  Hematuria  Severe anemia  CKD  Atrial fibrillation with watchman 2/24/25  CAD - h/o LAD stent.   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   Dyspnea with edema and decreased breath sounds-check chest x-ray, may need Lasix.    Hold off on clopidogrel until his hemoglobin is stable and Levophed is off, maybe Friday or Saturday.  I would want him to stay in the hospital for probably 2 to 3 days after restarting clopidogrel to see if he rebleeds.  I did speak with Dr. Mendes from urology on 3/12/2025.     Remains on Levophed and got 2 units of packed cells last night for hemoglobin that dropped to 6.  He is still actively bleeding and stable, cannot restart clopidogrel at this time.    Anna Sheth MD  03/13/25  09:25 EDT

## 2025-03-13 NOTE — PROGRESS NOTES
Name: Vijay Foote ADMIT: 3/10/2025   : 1940  PCP: German Mccray MD    MRN: [id number] LOS: 2 days   AGE/SEX: 84 y.o. male  ROOM: [facility]     Subjective   Subjective   CBI clamped. Denies any new complaint. No chest pain or shortness of breath other than chronic shortness of breath. Wife at bedside.     Objective   Objective   Vital Signs  Temp:  [96.9 °F (36.1 °C)-98.3 °F (36.8 °C)] 97 °F (36.1 °C)  Heart Rate:  [] 87  Resp:  [18-33] 18  BP: ()/(39-96) 95/54  SpO2:  [92 %-99 %] 97 %  on  Flow (L/min) (Oxygen Therapy):  [3] 3;   Device (Oxygen Therapy): nasal cannula  Body mass index is 23.49 kg/m².    Physical Exam  Constitutional:       General: He is not in acute distress.     Appearance: He is ill-appearing. He is not toxic-appearing.   HENT:      Head: Normocephalic and atraumatic.   Cardiovascular:      Rate and Rhythm: Normal rate and regular rhythm.   Pulmonary:      Effort: Pulmonary effort is normal. No respiratory distress.      Breath sounds: No wheezing or rhonchi.   Abdominal:      General: Bowel sounds are normal.      Palpations: Abdomen is soft.      Tenderness: There is no abdominal tenderness. There is no guarding or rebound.   Musculoskeletal:      Right lower le+      Left lower le+   Skin:     General: Skin is warm and dry.   Neurological:      General: No focal deficit present.      Mental Status: He is alert and oriented to person, place, and time.   Psychiatric:         Mood and Affect: Mood normal.         Behavior: Behavior normal.     Results Review  I reviewed the patient's new clinical results.  Results from last 7 days   Lab Units 25  0437 25  2152 25  1546 25  0541   WBC 10*3/mm3 9.27 8.44 10.21 14.20*   HEMOGLOBIN g/dL 7.8* 6.6* 7.2* 8.0*   PLATELETS 10*3/mm3 159 144 166 187     Results from last 7 days   Lab Units 25  0437 25  0541 25  1214 25  0617   SODIUM mmol/L 144 142 139 138   POTASSIUM mmol/L  4.0 4.4 5.0 4.5   CHLORIDE mmol/L 115* 111* 105 102   CO2 mmol/L 22.0 19.9* 22.4 25.3   BUN mg/dL 14 24* 29* 25*   CREATININE mg/dL 0.81 0.98 1.31* 1.05   GLUCOSE mg/dL 122* 146* 145* 112*     Lab Results   Component Value Date    ANIONGAP 7.0 03/13/2025     Estimated Creatinine Clearance: 67.6 mL/min (by C-G formula based on SCr of 0.81 mg/dL).   Lab Results   Component Value Date    EGFR 88.0 03/13/2025     Results from last 7 days   Lab Units 03/13/25  0437 03/12/25  0541 03/10/25  1743   ALBUMIN g/dL 2.4* 2.4* 3.7   BILIRUBIN mg/dL 0.7 0.6 0.7   ALK PHOS U/L 40 40 63   AST (SGOT) U/L 20 19 21   ALT (SGPT) U/L 9 9 14     Results from last 7 days   Lab Units 03/13/25  0437 03/12/25  0541 03/11/25  1214 03/11/25  0617 03/10/25  1743   CALCIUM mg/dL 8.0* 7.7* 8.2* 9.0 10.9*   ALBUMIN g/dL 2.4* 2.4*  --   --  3.7   MAGNESIUM mg/dL 2.2 1.8 1.9  --   --    PHOSPHORUS mg/dL 1.5* 2.5  --   --   --      Results from last 7 days   Lab Units 03/11/25  1214 03/11/25  0735 03/10/25  2306 03/10/25  2205 03/10/25  1743   PROCALCITONIN ng/mL  --   --  0.18  --   --    LACTATE mmol/L 1.7 1.2  --  1.6 3.4*     Glucose   Date/Time Value Ref Range Status   03/12/2025 0543 151 (H) 70 - 130 mg/dL Final   03/12/2025 0012 166 (H) 70 - 130 mg/dL Final   03/11/2025 1852 144 (H) 70 - 130 mg/dL Final   03/11/2025 0850 142 (H) 70 - 130 mg/dL Final       No radiology results for the last day    Scheduled Meds  arformoterol, 15 mcg, Nebulization, BID - RT  budesonide, 1 mg, Nebulization, BID  famotidine, 20 mg, Oral, BID AC  ipratropium-albuterol, 3 mL, Nebulization, 4x Daily  levothyroxine, 125 mcg, Oral, Daily  mupirocin, 1 Application, Each Nare, BID  piperacillin-tazobactam, 4.5 g, Intravenous, Q8H  rosuvastatin, 40 mg, Oral, Nightly  senna-docusate sodium, 2 tablet, Oral, BID  sodium chloride, 10 mL, Intravenous, Q12H  sodium chloride, 10 mL, Intravenous, Q12H  sodium chloride, 10 mL, Intravenous, Q12H  sodium chloride, 10 mL,  Intravenous, Q12H    Continuous Infusions  norepinephrine, 0.02-0.3 mcg/kg/min, Last Rate: 0.16 mcg/kg/min (03/13/25 0854)    PRN Meds    acetaminophen    senna-docusate sodium **AND** polyethylene glycol **AND** bisacodyl **AND** bisacodyl    Calcium Replacement - Follow Nurse / BPA Driven Protocol    Magnesium Standard Dose Replacement - Follow Nurse / BPA Driven Protocol    nitroglycerin    ondansetron ODT **OR** ondansetron    ondansetron    Phosphorus Replacement - Follow Nurse / BPA Driven Protocol    Potassium Replacement - Follow Nurse / BPA Driven Protocol    sodium chloride    sodium chloride    sodium chloride    sodium chloride    sodium chloride    sodium chloride    sodium chloride    norepinephrine, 0.02-0.3 mcg/kg/min, Last Rate: 0.16 mcg/kg/min (03/13/25 0854)    Diet  Diet: Cardiac; Healthy Heart (2-3 Na+); Fluid Consistency: Thin (IDDSI 0)       Assessment/Plan     Active Hospital Problems    Diagnosis  POA    **Hematuria [R31.9]  Yes    Hemorrhagic shock [R57.8]  Unknown    Renal mass [N28.89]  Unknown    Presence of Watchman left atrial appendage closure device [Z95.818]  Unknown    CHF (congestive heart failure) [I50.9]  Yes    Hypertension [I10]  Yes    Coronary artery disease [I25.10]  Yes    COPD (chronic obstructive pulmonary disease) [J44.9]  Yes      Resolved Hospital Problems   No resolved problems to display.     Patient is a 84 y.o. male     Right renal mass  Gross hematuria, recurrent hematuria  Acute blood loss anemia and shock  Levophed  Has received 4 total units PRBC- another today, 1 unit platelets.  Cystoscopy bedside and then in OR 3/12/2025, clot evacuation  CBI currently clamped  Antibiotics for possible sepsis  MRI to evaluate for possible IVC thrombus    A-fib status post Watchman  History of ICD  Anticoagulation switched to DAPT 6 months-currently on hold due to hematuria. Cardiology following. Recs monitor after restarting Plavix for a few days to make sure he does not  rebleed    COPD  AISSATOU  Chronic hypoxic respiratory failure  Flow (L/min) (Oxygen Therapy):  [3] 3 (baseline 3 L/min)    Hypertension  Hyperlipidemia    DVT prophylaxis  SCDs    Discharge  TBD  Expected Discharge Date: 3/14/2025; Expected Discharge Time:     Discussed with patient and family    Boris Espinoza MD  [facility]  03/13/25 11:53 EDT

## 2025-03-13 NOTE — CONSULTS
"Nutrition Services    Patient Name:  Vijay Foote  YOB: 1940  MRN: [id number]  Admit Date:  3/10/2025    Assessment Date:  03/13/25    NUTRITION SCREENING      Reason for Encounter MST score 2+, Nurse Admission Screen   Diagnosis/Problem Hx: a fib, congestive heart failure, CAD, COPD, R renal mass    Pt admitted to [facility] with hematuria. S/p cystoscopy 3/12. Aylaa catheter in place. Urology following. RD visited pt and pt's wife at bedside. Pt's wife requesting addition of chocolate Boost BID, pt drinks two High Protein Boost shakes daily at home. Pt eats breakfast, skips lunch, and eats dinner. Pt's wife reports 20# wt loss x 6 months to 1 year. No wt hx to review. NFPE completed and not consistent with nutrition diagnosis of malnutrition at this time using AND/ASPEN criteria.         PO Diet Diet: Cardiac; Healthy Heart (2-3 Na+); Fluid Consistency: Thin (IDDSI 0)   Supplements n/a   PO Intake % 25% x 1 meal       Medications MAR reviewed by RD   Labs  Hypophosphatemia - replacement given    Physical Findings No documented edema    Nasal cannula    PIV, CVC triple lumen   GI Function Smear 3/12   Skin Status Intact        Height  Weight  BMI  Weight Trend     Height: 170.2 cm (67\")  Weight: 68 kg (150 lb) (03/11/25 0921)  Body mass index is 23.49 kg/m².  Other: pt's wife reports 20# wt loss in 6 months to 1 year, pt reports he weighed 180# about 1 year ago, if accurate 16.7 wt loss x 1 year.       Nutrition Problem (PES) Unintentional wt loss related to decreased appetite as evidenced by reported 16.7% wt loss x 1 year.       Intervention/Plan Continue current diet. Addition of Boost Glucose Control BID (Provides 380 kcals, 32 g protein if consumed) - chocolate.     RD to follow up per protocol.     Results from last 7 days   Lab Units 03/13/25  0437 03/12/25  0541 03/11/25  1214 03/11/25  0617 03/10/25  1743   SODIUM mmol/L 144 142 139   < > 136   POTASSIUM mmol/L 4.0 4.4 5.0   < > 4.9   CHLORIDE " mmol/L 115* 111* 105   < > 96*   CO2 mmol/L 22.0 19.9* 22.4   < > 26.9   BUN mg/dL 14 24* 29*   < > 27*   CREATININE mg/dL 0.81 0.98 1.31*   < > 1.23   CALCIUM mg/dL 8.0* 7.7* 8.2*   < > 10.9*   BILIRUBIN mg/dL 0.7 0.6  --   --  0.7   ALK PHOS U/L 40 40  --   --  63   ALT (SGPT) U/L 9 9  --   --  14   AST (SGOT) U/L 20 19  --   --  21   GLUCOSE mg/dL 122* 146* 145*   < > 131*    < > = values in this interval not displayed.     Results from last 7 days   Lab Units 03/13/25  0437 03/12/25  1546 03/12/25  0541 03/11/25  2320 03/11/25  1214   MAGNESIUM mg/dL 2.2  --  1.8  --  1.9   PHOSPHORUS mg/dL 1.5*  --  2.5   < >  --    HEMOGLOBIN g/dL 7.8*   < > 8.0*   < > 7.0*   HEMATOCRIT % 23.4*   < > 24.1*   < > 22.7*    < > = values in this interval not displayed.     Lab Results   Component Value Date    HGBA1C 6.2 (H) 09/19/2024       Electronically signed by:  Yaquelin Rouse RD  03/13/25 08:25 EDT

## 2025-03-13 NOTE — CASE MANAGEMENT/SOCIAL WORK
Continued Stay Note  [facility]     Patient Name: Vijay Foote  MRN: [id number]  Today's Date: 3/13/2025    Admit Date: 3/10/2025    Plan: Pending PT recommendations   Discharge Plan       Row Name 03/13/25 1213       Plan    Plan Pending PT recommendations    Plan Comments Barriers to dc: MRI today, CBI, 1 unit PRBC today.                          Lashanda Dominguez RN

## 2025-03-13 NOTE — PROGRESS NOTES
First urology progress note:    A little bit of bleeding around catheter.  CBI on slow drip with clear output.  Still on Levophed.  On nasal cannula oxygen.  Not at baseline for mobility.  CBI clamped at bedside    Assessment and plan:    84-year-old male with    1.  Right renal mass    2.  Gross hematuria    -CBI clamped; continue Ayala catheter and monitor output of the urine and potential need for restarting CBI  -MRI abdomen pending; patient has a Watchman and assessing if it is MRI compatible; MRI is necessary to help determine if there is possible IVC thrombus  -Okay for diet  -1 unit RBC  -PT evaluation

## 2025-03-14 ENCOUNTER — APPOINTMENT (OUTPATIENT)
Dept: GENERAL RADIOLOGY | Facility: HOSPITAL | Age: 85
End: 2025-03-14
Payer: MEDICARE

## 2025-03-14 LAB
ALBUMIN SERPL-MCNC: 2.8 G/DL (ref 3.5–5.2)
ALBUMIN/GLOB SERPL: 1.1 G/DL
ALP SERPL-CCNC: 50 U/L (ref 39–117)
ALT SERPL W P-5'-P-CCNC: 12 U/L (ref 1–41)
ANION GAP SERPL CALCULATED.3IONS-SCNC: 10 MMOL/L (ref 5–15)
AST SERPL-CCNC: 25 U/L (ref 1–40)
BASOPHILS # BLD AUTO: 0.01 10*3/MM3 (ref 0–0.2)
BASOPHILS # BLD AUTO: 0.02 10*3/MM3 (ref 0–0.2)
BASOPHILS NFR BLD AUTO: 0.1 % (ref 0–1.5)
BASOPHILS NFR BLD AUTO: 0.2 % (ref 0–1.5)
BH BB BLOOD EXPIRATION DATE: NORMAL
BH BB BLOOD TYPE BARCODE: 600
BH BB DISPENSE STATUS: NORMAL
BH BB PRODUCT CODE: NORMAL
BH BB UNIT NUMBER: NORMAL
BILIRUB SERPL-MCNC: 0.5 MG/DL (ref 0–1.2)
BUN SERPL-MCNC: 11 MG/DL (ref 8–23)
BUN/CREAT SERPL: 11.3 (ref 7–25)
CALCIUM SPEC-SCNC: 8.5 MG/DL (ref 8.6–10.5)
CHLORIDE SERPL-SCNC: 110 MMOL/L (ref 98–107)
CO2 SERPL-SCNC: 23 MMOL/L (ref 22–29)
CREAT SERPL-MCNC: 0.97 MG/DL (ref 0.76–1.27)
CROSSMATCH INTERPRETATION: NORMAL
DEPRECATED RDW RBC AUTO: 45.4 FL (ref 37–54)
DEPRECATED RDW RBC AUTO: 47 FL (ref 37–54)
DEPRECATED RDW RBC AUTO: 48.5 FL (ref 37–54)
DEPRECATED RDW RBC AUTO: 48.8 FL (ref 37–54)
EGFRCR SERPLBLD CKD-EPI 2021: 77.9 ML/MIN/1.73
EOSINOPHIL # BLD AUTO: 0 10*3/MM3 (ref 0–0.4)
EOSINOPHIL NFR BLD AUTO: 0 % (ref 0.3–6.2)
ERYTHROCYTE [DISTWIDTH] IN BLOOD BY AUTOMATED COUNT: 14.5 % (ref 12.3–15.4)
ERYTHROCYTE [DISTWIDTH] IN BLOOD BY AUTOMATED COUNT: 14.6 % (ref 12.3–15.4)
GLOBULIN UR ELPH-MCNC: 2.5 GM/DL
GLUCOSE SERPL-MCNC: 141 MG/DL (ref 65–99)
HCT VFR BLD AUTO: 26.5 % (ref 37.5–51)
HCT VFR BLD AUTO: 27 % (ref 37.5–51)
HCT VFR BLD AUTO: 29.9 % (ref 37.5–51)
HCT VFR BLD AUTO: 30.9 % (ref 37.5–51)
HGB BLD-MCNC: 8.6 G/DL (ref 13–17.7)
HGB BLD-MCNC: 9.1 G/DL (ref 13–17.7)
HGB BLD-MCNC: 9.5 G/DL (ref 13–17.7)
HGB BLD-MCNC: 9.6 G/DL (ref 13–17.7)
IMM GRANULOCYTES # BLD AUTO: 0.03 10*3/MM3 (ref 0–0.05)
IMM GRANULOCYTES # BLD AUTO: 0.04 10*3/MM3 (ref 0–0.05)
IMM GRANULOCYTES # BLD AUTO: 0.04 10*3/MM3 (ref 0–0.05)
IMM GRANULOCYTES # BLD AUTO: 0.06 10*3/MM3 (ref 0–0.05)
IMM GRANULOCYTES NFR BLD AUTO: 0.4 % (ref 0–0.5)
IMM GRANULOCYTES NFR BLD AUTO: 0.5 % (ref 0–0.5)
IMM GRANULOCYTES NFR BLD AUTO: 0.5 % (ref 0–0.5)
IMM GRANULOCYTES NFR BLD AUTO: 0.7 % (ref 0–0.5)
LYMPHOCYTES # BLD AUTO: 0.58 10*3/MM3 (ref 0.7–3.1)
LYMPHOCYTES # BLD AUTO: 0.67 10*3/MM3 (ref 0.7–3.1)
LYMPHOCYTES # BLD AUTO: 0.69 10*3/MM3 (ref 0.7–3.1)
LYMPHOCYTES # BLD AUTO: 0.7 10*3/MM3 (ref 0.7–3.1)
LYMPHOCYTES NFR BLD AUTO: 6.4 % (ref 19.6–45.3)
LYMPHOCYTES NFR BLD AUTO: 8.1 % (ref 19.6–45.3)
LYMPHOCYTES NFR BLD AUTO: 8.2 % (ref 19.6–45.3)
LYMPHOCYTES NFR BLD AUTO: 9.1 % (ref 19.6–45.3)
MAGNESIUM SERPL-MCNC: 1.8 MG/DL (ref 1.6–2.4)
MCH RBC QN AUTO: 28.3 PG (ref 26.6–33)
MCH RBC QN AUTO: 28.8 PG (ref 26.6–33)
MCH RBC QN AUTO: 28.9 PG (ref 26.6–33)
MCH RBC QN AUTO: 29.2 PG (ref 26.6–33)
MCHC RBC AUTO-ENTMCNC: 31.1 G/DL (ref 31.5–35.7)
MCHC RBC AUTO-ENTMCNC: 31.8 G/DL (ref 31.5–35.7)
MCHC RBC AUTO-ENTMCNC: 32.5 G/DL (ref 31.5–35.7)
MCHC RBC AUTO-ENTMCNC: 33.7 G/DL (ref 31.5–35.7)
MCV RBC AUTO: 86.5 FL (ref 79–97)
MCV RBC AUTO: 88.6 FL (ref 79–97)
MCV RBC AUTO: 90.9 FL (ref 79–97)
MCV RBC AUTO: 91.2 FL (ref 79–97)
MONOCYTES # BLD AUTO: 1.11 10*3/MM3 (ref 0.1–0.9)
MONOCYTES # BLD AUTO: 1.21 10*3/MM3 (ref 0.1–0.9)
MONOCYTES # BLD AUTO: 1.22 10*3/MM3 (ref 0.1–0.9)
MONOCYTES # BLD AUTO: 1.33 10*3/MM3 (ref 0.1–0.9)
MONOCYTES NFR BLD AUTO: 13.3 % (ref 5–12)
MONOCYTES NFR BLD AUTO: 14.2 % (ref 5–12)
MONOCYTES NFR BLD AUTO: 15.1 % (ref 5–12)
MONOCYTES NFR BLD AUTO: 15.6 % (ref 5–12)
NEUTROPHILS NFR BLD AUTO: 5.51 10*3/MM3 (ref 1.7–7)
NEUTROPHILS NFR BLD AUTO: 6.44 10*3/MM3 (ref 1.7–7)
NEUTROPHILS NFR BLD AUTO: 6.59 10*3/MM3 (ref 1.7–7)
NEUTROPHILS NFR BLD AUTO: 7.24 10*3/MM3 (ref 1.7–7)
NEUTROPHILS NFR BLD AUTO: 75.3 % (ref 42.7–76)
NEUTROPHILS NFR BLD AUTO: 75.7 % (ref 42.7–76)
NEUTROPHILS NFR BLD AUTO: 76.9 % (ref 42.7–76)
NEUTROPHILS NFR BLD AUTO: 79.5 % (ref 42.7–76)
NRBC BLD AUTO-RTO: 0 /100 WBC (ref 0–0.2)
PHOSPHATE SERPL-MCNC: 1.4 MG/DL (ref 2.5–4.5)
PHOSPHATE SERPL-MCNC: 1.7 MG/DL (ref 2.5–4.5)
PHOSPHATE SERPL-MCNC: 1.9 MG/DL (ref 2.5–4.5)
PLATELET # BLD AUTO: 129 10*3/MM3 (ref 140–450)
PLATELET # BLD AUTO: 139 10*3/MM3 (ref 140–450)
PLATELET # BLD AUTO: 141 10*3/MM3 (ref 140–450)
PLATELET # BLD AUTO: 148 10*3/MM3 (ref 140–450)
PMV BLD AUTO: 8.9 FL (ref 6–12)
PMV BLD AUTO: 9.1 FL (ref 6–12)
PMV BLD AUTO: 9.4 FL (ref 6–12)
PMV BLD AUTO: 9.5 FL (ref 6–12)
POTASSIUM SERPL-SCNC: 3.9 MMOL/L (ref 3.5–5.2)
PROT SERPL-MCNC: 5.3 G/DL (ref 6–8.5)
RBC # BLD AUTO: 2.99 10*6/MM3 (ref 4.14–5.8)
RBC # BLD AUTO: 3.12 10*6/MM3 (ref 4.14–5.8)
RBC # BLD AUTO: 3.29 10*6/MM3 (ref 4.14–5.8)
RBC # BLD AUTO: 3.39 10*6/MM3 (ref 4.14–5.8)
SODIUM SERPL-SCNC: 143 MMOL/L (ref 136–145)
UNIT  ABO: NORMAL
UNIT  RH: NORMAL
WBC NRBC COR # BLD AUTO: 7.33 10*3/MM3 (ref 3.4–10.8)
WBC NRBC COR # BLD AUTO: 8.51 10*3/MM3 (ref 3.4–10.8)
WBC NRBC COR # BLD AUTO: 8.57 10*3/MM3 (ref 3.4–10.8)
WBC NRBC COR # BLD AUTO: 9.1 10*3/MM3 (ref 3.4–10.8)

## 2025-03-14 PROCEDURE — 85025 COMPLETE CBC W/AUTO DIFF WBC: CPT | Performed by: UROLOGY

## 2025-03-14 PROCEDURE — 97162 PT EVAL MOD COMPLEX 30 MIN: CPT

## 2025-03-14 PROCEDURE — 93010 ELECTROCARDIOGRAM REPORT: CPT | Performed by: STUDENT IN AN ORGANIZED HEALTH CARE EDUCATION/TRAINING PROGRAM

## 2025-03-14 PROCEDURE — 84100 ASSAY OF PHOSPHORUS: CPT | Performed by: HOSPITALIST

## 2025-03-14 PROCEDURE — 93005 ELECTROCARDIOGRAM TRACING: CPT | Performed by: STUDENT IN AN ORGANIZED HEALTH CARE EDUCATION/TRAINING PROGRAM

## 2025-03-14 PROCEDURE — 94799 UNLISTED PULMONARY SVC/PX: CPT

## 2025-03-14 PROCEDURE — 99232 SBSQ HOSP IP/OBS MODERATE 35: CPT | Performed by: INTERNAL MEDICINE

## 2025-03-14 PROCEDURE — 25010000002 HEPARIN (PORCINE) PER 1000 UNITS: Performed by: STUDENT IN AN ORGANIZED HEALTH CARE EDUCATION/TRAINING PROGRAM

## 2025-03-14 PROCEDURE — 74018 RADEX ABDOMEN 1 VIEW: CPT

## 2025-03-14 PROCEDURE — 83735 ASSAY OF MAGNESIUM: CPT | Performed by: UROLOGY

## 2025-03-14 PROCEDURE — 25010000002 PIPERACILLIN SOD-TAZOBACTAM PER 1 G: Performed by: UROLOGY

## 2025-03-14 PROCEDURE — 97110 THERAPEUTIC EXERCISES: CPT

## 2025-03-14 PROCEDURE — 25010000002 FUROSEMIDE PER 20 MG

## 2025-03-14 PROCEDURE — 85025 COMPLETE CBC W/AUTO DIFF WBC: CPT | Performed by: STUDENT IN AN ORGANIZED HEALTH CARE EDUCATION/TRAINING PROGRAM

## 2025-03-14 PROCEDURE — 71045 X-RAY EXAM CHEST 1 VIEW: CPT

## 2025-03-14 PROCEDURE — 94761 N-INVAS EAR/PLS OXIMETRY MLT: CPT

## 2025-03-14 PROCEDURE — 80053 COMPREHEN METABOLIC PANEL: CPT | Performed by: UROLOGY

## 2025-03-14 RX ORDER — LORAZEPAM 2 MG/ML
0.5 INJECTION INTRAMUSCULAR ONCE AS NEEDED
Status: DISCONTINUED | OUTPATIENT
Start: 2025-03-14 | End: 2025-03-19 | Stop reason: HOSPADM

## 2025-03-14 RX ORDER — POLYETHYLENE GLYCOL 3350 17 G/17G
17 POWDER, FOR SOLUTION ORAL DAILY PRN
Status: DISCONTINUED | OUTPATIENT
Start: 2025-03-14 | End: 2025-03-19 | Stop reason: HOSPADM

## 2025-03-14 RX ORDER — BISACODYL 5 MG/1
5 TABLET, DELAYED RELEASE ORAL DAILY PRN
Status: DISCONTINUED | OUTPATIENT
Start: 2025-03-14 | End: 2025-03-19 | Stop reason: HOSPADM

## 2025-03-14 RX ORDER — FUROSEMIDE 10 MG/ML
40 INJECTION INTRAMUSCULAR; INTRAVENOUS ONCE
Status: COMPLETED | OUTPATIENT
Start: 2025-03-14 | End: 2025-03-14

## 2025-03-14 RX ORDER — TRAZODONE HYDROCHLORIDE 50 MG/1
50 TABLET ORAL NIGHTLY PRN
Status: DISCONTINUED | OUTPATIENT
Start: 2025-03-14 | End: 2025-03-19 | Stop reason: HOSPADM

## 2025-03-14 RX ORDER — BISACODYL 10 MG
10 SUPPOSITORY, RECTAL RECTAL DAILY PRN
Status: DISCONTINUED | OUTPATIENT
Start: 2025-03-14 | End: 2025-03-19 | Stop reason: HOSPADM

## 2025-03-14 RX ORDER — HEPARIN SODIUM 5000 [USP'U]/ML
5000 INJECTION, SOLUTION INTRAVENOUS; SUBCUTANEOUS EVERY 8 HOURS SCHEDULED
Status: DISCONTINUED | OUTPATIENT
Start: 2025-03-14 | End: 2025-03-19 | Stop reason: HOSPADM

## 2025-03-14 RX ORDER — AMOXICILLIN 250 MG
2 CAPSULE ORAL 2 TIMES DAILY PRN
Status: DISCONTINUED | OUTPATIENT
Start: 2025-03-14 | End: 2025-03-19 | Stop reason: HOSPADM

## 2025-03-14 RX ADMIN — MUPIROCIN 1 APPLICATION: 20 OINTMENT TOPICAL at 20:33

## 2025-03-14 RX ADMIN — ARFORMOTEROL TARTRATE 15 MCG: 15 SOLUTION RESPIRATORY (INHALATION) at 19:09

## 2025-03-14 RX ADMIN — Medication 10 ML: at 20:48

## 2025-03-14 RX ADMIN — BUDESONIDE 1 MG: 1 INHALANT ORAL at 07:47

## 2025-03-14 RX ADMIN — FAMOTIDINE 20 MG: 20 TABLET, FILM COATED ORAL at 17:53

## 2025-03-14 RX ADMIN — HEPARIN SODIUM 5000 UNITS: 5000 INJECTION INTRAVENOUS; SUBCUTANEOUS at 22:19

## 2025-03-14 RX ADMIN — IPRATROPIUM BROMIDE AND ALBUTEROL SULFATE 3 ML: .5; 3 SOLUTION RESPIRATORY (INHALATION) at 14:56

## 2025-03-14 RX ADMIN — Medication 2 PACKET: at 16:07

## 2025-03-14 RX ADMIN — SENNOSIDES AND DOCUSATE SODIUM 2 TABLET: 50; 8.6 TABLET ORAL at 08:05

## 2025-03-14 RX ADMIN — FAMOTIDINE 20 MG: 20 TABLET, FILM COATED ORAL at 08:05

## 2025-03-14 RX ADMIN — Medication 10 ML: at 08:05

## 2025-03-14 RX ADMIN — POTASSIUM, SODIUM PHOSPHATES 280 MG-160 MG-250 MG ORAL POWDER PACKET 2 PACKET: POWDER IN PACKET at 22:19

## 2025-03-14 RX ADMIN — Medication 10 ML: at 08:06

## 2025-03-14 RX ADMIN — LEVOTHYROXINE SODIUM 125 MCG: 125 TABLET ORAL at 08:05

## 2025-03-14 RX ADMIN — ROSUVASTATIN CALCIUM 40 MG: 20 TABLET, FILM COATED ORAL at 20:33

## 2025-03-14 RX ADMIN — Medication 2 PACKET: at 02:35

## 2025-03-14 RX ADMIN — PIPERACILLIN SODIUM AND TAZOBACTAM SODIUM 4.5 G: 4; .5 INJECTION, POWDER, LYOPHILIZED, FOR SOLUTION INTRAVENOUS at 10:11

## 2025-03-14 RX ADMIN — BUDESONIDE 1 MG: 1 INHALANT ORAL at 19:10

## 2025-03-14 RX ADMIN — PIPERACILLIN SODIUM AND TAZOBACTAM SODIUM 4.5 G: 4; .5 INJECTION, POWDER, LYOPHILIZED, FOR SOLUTION INTRAVENOUS at 02:35

## 2025-03-14 RX ADMIN — PIPERACILLIN SODIUM AND TAZOBACTAM SODIUM 4.5 G: 4; .5 INJECTION, POWDER, LYOPHILIZED, FOR SOLUTION INTRAVENOUS at 18:02

## 2025-03-14 RX ADMIN — IPRATROPIUM BROMIDE AND ALBUTEROL SULFATE 3 ML: .5; 3 SOLUTION RESPIRATORY (INHALATION) at 19:14

## 2025-03-14 RX ADMIN — FUROSEMIDE 40 MG: 10 INJECTION, SOLUTION INTRAMUSCULAR; INTRAVENOUS at 00:27

## 2025-03-14 RX ADMIN — ACETAMINOPHEN 650 MG: 325 TABLET, FILM COATED ORAL at 02:28

## 2025-03-14 RX ADMIN — IPRATROPIUM BROMIDE AND ALBUTEROL SULFATE 3 ML: .5; 3 SOLUTION RESPIRATORY (INHALATION) at 07:39

## 2025-03-14 RX ADMIN — MUPIROCIN 1 APPLICATION: 20 OINTMENT TOPICAL at 08:05

## 2025-03-14 RX ADMIN — ARFORMOTEROL TARTRATE 15 MCG: 15 SOLUTION RESPIRATORY (INHALATION) at 07:46

## 2025-03-14 NOTE — PROGRESS NOTES
Name: Vijay Foote ADMIT: 3/10/2025   : 1940  PCP: German Mccray MD    MRN: [id number] LOS: 3 days   AGE/SEX: 84 y.o. male  ROOM: [facility]     Subjective   Subjective   No more hematuria. He is having loose stools. Has been on twice a day bowel medications. Very weak needing a lot of help to get to the bathroom and back. Wife at bedside     Objective   Objective   Vital Signs  Temp:  [97.4 °F (36.3 °C)-98.1 °F (36.7 °C)] 97.4 °F (36.3 °C)  Heart Rate:  [] 95  Resp:  [20] 20  BP: ()/(45-98) 95/66  SpO2:  [89 %-99 %] 98 %  on  Flow (L/min) (Oxygen Therapy):  [3-4] 4;   Device (Oxygen Therapy): nasal cannula  Body mass index is 23.49 kg/m².    Physical Exam  Constitutional:       General: He is not in acute distress.     Appearance: He is ill-appearing. He is not toxic-appearing.   HENT:      Head: Normocephalic and atraumatic.   Cardiovascular:      Rate and Rhythm: Normal rate and regular rhythm.   Pulmonary:      Effort: Pulmonary effort is normal. No respiratory distress.   Neurological:      Mental Status: He is alert.      Motor: Weakness present.     Results Review  I reviewed the patient's new clinical results.  Results from last 7 days   Lab Units 25  0504 25  0051 25  1501 25  0437   WBC 10*3/mm3 8.51 9.10 8.70 9.27   HEMOGLOBIN g/dL 9.1* 9.6* 8.7* 7.8*   PLATELETS 10*3/mm3 139* 148 128* 159     Results from last 7 days   Lab Units 25  0046 25  0437 25  0541 25  1214   SODIUM mmol/L 143 144 142 139   POTASSIUM mmol/L 3.9 4.0 4.4 5.0   CHLORIDE mmol/L 110* 115* 111* 105   CO2 mmol/L 23.0 22.0 19.9* 22.4   BUN mg/dL 11 14 24* 29*   CREATININE mg/dL 0.97 0.81 0.98 1.31*   GLUCOSE mg/dL 141* 122* 146* 145*     Lab Results   Component Value Date    ANIONGAP 10.0 2025     Estimated Creatinine Clearance: 56.5 mL/min (by C-G formula based on SCr of 0.97 mg/dL).   Lab Results   Component Value Date    EGFR 77.9 2025     Results  from last 7 days   Lab Units 03/14/25  0046 03/13/25  0437 03/12/25  0541 03/10/25  1743   ALBUMIN g/dL 2.8* 2.4* 2.4* 3.7   BILIRUBIN mg/dL 0.5 0.7 0.6 0.7   ALK PHOS U/L 50 40 40 63   AST (SGOT) U/L 25 20 19 21   ALT (SGPT) U/L 12 9 9 14     Results from last 7 days   Lab Units 03/14/25  1024 03/14/25  0046 03/13/25  1501 03/13/25  0437 03/12/25  0541 03/12/25  0541 03/11/25  1214 03/11/25  0617 03/10/25  1743   CALCIUM mg/dL  --  8.5*  --  8.0*  --  7.7* 8.2*   < > 10.9*   ALBUMIN g/dL  --  2.8*  --  2.4*  --  2.4*  --   --  3.7   MAGNESIUM mg/dL  --  1.8  --  2.2  --  1.8 1.9  --   --    PHOSPHORUS mg/dL 1.7* 1.9* 1.3* 1.5*   < > 2.5  --   --   --     < > = values in this interval not displayed.     Results from last 7 days   Lab Units 03/11/25  1214 03/11/25  0735 03/10/25  2306 03/10/25  2205 03/10/25  1743   PROCALCITONIN ng/mL  --   --  0.18  --   --    LACTATE mmol/L 1.7 1.2  --  1.6 3.4*     Glucose   Date/Time Value Ref Range Status   03/12/2025 0543 151 (H) 70 - 130 mg/dL Final   03/12/2025 0012 166 (H) 70 - 130 mg/dL Final   03/11/2025 1852 144 (H) 70 - 130 mg/dL Final       XR Chest 1 View  Result Date: 3/14/2025   1. Stable findings within the chest. 2. No evidence of bowel obstruction.  This report was finalized on 3/14/2025 12:57 AM by Dr. Lili Vann M.D on Workstation: [id number]      XR Abdomen KUB  Result Date: 3/14/2025   1. Stable findings within the chest. 2. No evidence of bowel obstruction.  This report was finalized on 3/14/2025 12:57 AM by Dr. Lili Vann M.D on Workstation: [id number]      XR Chest 1 View  Result Date: 3/13/2025   1. New small pleural effusions. 2. New basilar lung opacities. 3. Suspect airways disease  This report was finalized on 3/13/2025 5:40 PM by Dr. Manoj Page M.D on Workstation: [id number]        Scheduled Meds  arformoterol, 15 mcg, Nebulization, BID - RT  budesonide, 1 mg, Nebulization, BID  famotidine, 20 mg, Oral, BID  AC  ipratropium-albuterol, 3 mL, Nebulization, 4x Daily  levothyroxine, 125 mcg, Oral, Daily  mupirocin, 1 Application, Each Nare, BID  piperacillin-tazobactam, 4.5 g, Intravenous, Q8H  potassium & sodium phosphates, 2 packet, Oral, Once  rosuvastatin, 40 mg, Oral, Nightly  sodium chloride, 10 mL, Intravenous, Q12H  sodium chloride, 10 mL, Intravenous, Q12H  sodium chloride, 10 mL, Intravenous, Q12H  sodium chloride, 10 mL, Intravenous, Q12H    Continuous Infusions  norepinephrine, 0.02-0.3 mcg/kg/min, Last Rate: Stopped (03/14/25 0806)    PRN Meds    acetaminophen    senna-docusate sodium **AND** polyethylene glycol **AND** bisacodyl **AND** bisacodyl    Calcium Replacement - Follow Nurse / BPA Driven Protocol    LORazepam    Magnesium Standard Dose Replacement - Follow Nurse / BPA Driven Protocol    nitroglycerin    ondansetron ODT **OR** ondansetron    ondansetron    Phosphorus Replacement - Follow Nurse / BPA Driven Protocol    Potassium Replacement - Follow Nurse / BPA Driven Protocol    sodium chloride    sodium chloride    sodium chloride    sodium chloride    sodium chloride    sodium chloride    sodium chloride    traZODone    norepinephrine, 0.02-0.3 mcg/kg/min, Last Rate: Stopped (03/14/25 0806)    Diet  Diet: Cardiac; Healthy Heart (2-3 Na+); Fluid Consistency: Thin (IDDSI 0)       Assessment/Plan     Active Hospital Problems    Diagnosis  POA    **Hematuria [R31.9]  Yes    Hemorrhagic shock [R57.8]  Unknown    Renal mass [N28.89]  Unknown    Presence of Watchman left atrial appendage closure device [Z95.818]  Unknown    CHF (congestive heart failure) [I50.9]  Yes    Hypertension [I10]  Yes    Coronary artery disease [I25.10]  Yes    COPD (chronic obstructive pulmonary disease) [J44.9]  Yes      Resolved Hospital Problems   No resolved problems to display.     Patient is a 84 y.o. male     Right renal mass  Gross hematuria, recurrent hematuria  Acute blood loss anemia and shock  Levophed off this  morning  Has received 5 total units PRBC, 1 unit platelets. Hemoglobin stable  Cystoscopy bedside and then in OR 3/12/2025, clot evacuation. Status post CBI. Now has indwelling catheter  Antibiotics for possible sepsis  MRI to evaluate for possible IVC thrombus    A-fib status post Watchman  History of ICD  Anticoagulation switched to DAPT 6 months-currently on hold due to hematuria. Cardiology following. Recs monitor after restarting Plavix for a few days to make sure he does not rebleed    COPD  AISSATOU  Chronic hypoxic respiratory failure  Flow (L/min) (Oxygen Therapy):  [3-4] 4 (baseline 3 L/min)    Hypertension  Hyperlipidemia    DVT prophylaxis  SCDs    Discharge  TBD. Therapies following  Expected Discharge Date: 3/17/2025; Expected Discharge Time:     Discussed with patient and family    Boris Espinoza MD  [facility]  03/14/25 13:24 EDT

## 2025-03-14 NOTE — PROGRESS NOTES
Follow up for gross hematuria, right renal mass, right renal pelvis mass, possible IVC thrombus.    Resting comfortably. No problems with rocha overnight. Patient was unable to tolerate MRI due to shortness of breath.    NAD  Abd soft, nondistended  Rocha intact, urine yellow and clear.    WBC 8.5, Hb 9.1  Cr 0.97  Phos 1.9, Mg 1.8    Bld Cx: NGTD    Plan:  - continue indwelling catheter  - will need MRI once shortness of breath has improved  - will continue to follow

## 2025-03-14 NOTE — PLAN OF CARE
Goal Outcome Evaluation:              Outcome Evaluation: Pt admitted with hematuria, s/p cystoscopy w/ clot evacuation 3/12,  h/o known renal mass.  Normally on 3L NC at home, lives w/ spouse, no use of AD, denies recent falls, drove to [place] last Friday per spouse.  Pt normally sleeps in a recliner.   Demonstrates generalized weakness, limited activity tolerance but abl eto stand w/ CGA.  Ambulated 30' w/ CGA, trialed RW due to report of not being up for few days but pt trying to carry the RW during turns.  Recommend ambulating in room /goodson w/ staff  as tolerated.  RN aware of slight redness on bottom, when pt stood, recommend waffle cushion and education pt/spouse about using remote on recliner to help w/ redistributing weight.  Anticipate DC to home with home PT.    Anticipated Discharge Disposition (PT): home with assist, home with home health (currently not ready but anticipate progress)

## 2025-03-14 NOTE — PROGRESS NOTES
LOS: 3 days   Patient Care Team:  German Mccray MD as PCP - General (Internal Medicine)  Alicia Pickens MD as Consulting Physician (Interventional Cardiology)  Armani Siddiqui MD as Consulting Physician (Pulmonary Disease)    Chief Complaint:     Follow-up Watchman, antiplatelet therapy    Interval History:     His breathing is getting better.  He has no chest pain or dizziness.  He is in the bathroom and had a lot of stool out but has had several days of stool softeners.    Objective   Vital Signs  Temp:  [97 °F (36.1 °C)-98.1 °F (36.7 °C)] 97.4 °F (36.3 °C)  Heart Rate:  [] 102  Resp:  [18-20] 20  BP: ()/(45-98) 111/75    Intake/Output Summary (Last 24 hours) at 3/14/2025 1032  Last data filed at 3/13/2025 1100  Gross per 24 hour   Intake 307.5 ml   Output --   Net 307.5 ml       Comfortable NAD  Neck supple, no JVD or thyromegaly appreciated  S1/S2 irregular, rate controlled, no m/r/g  Lungs CTA B, normal effort  Abdomen S/NT/ND (+) BS, no HSM appreciated  Extremities warm, no clubbing, cyanosis, or edema  No visible or palpable skin lesions  A/Ox4, mood and affect appropriate    Results Review:      Results from last 7 days   Lab Units 03/14/25  0046 03/13/25  0437 03/12/25  0541   SODIUM mmol/L 143 144 142   POTASSIUM mmol/L 3.9 4.0 4.4   CHLORIDE mmol/L 110* 115* 111*   CO2 mmol/L 23.0 22.0 19.9*   BUN mg/dL 11 14 24*   CREATININE mg/dL 0.97 0.81 0.98   GLUCOSE mg/dL 141* 122* 146*   CALCIUM mg/dL 8.5* 8.0* 7.7*         Results from last 7 days   Lab Units 03/14/25  0504 03/14/25  0051 03/13/25  1501   WBC 10*3/mm3 8.51 9.10 8.70   HEMOGLOBIN g/dL 9.1* 9.6* 8.7*   HEMATOCRIT % 27.0* 30.9* 27.4*   PLATELETS 10*3/mm3 139* 148 128*             Results from last 7 days   Lab Units 03/14/25  0046   MAGNESIUM mg/dL 1.8           I reviewed the patient's new clinical results.  I personally viewed and interpreted the patient's EKG/Telemetry data        Medication Review:    arformoterol, 15 mcg, Nebulization, BID - RT  budesonide, 1 mg, Nebulization, BID  famotidine, 20 mg, Oral, BID AC  ipratropium-albuterol, 3 mL, Nebulization, 4x Daily  levothyroxine, 125 mcg, Oral, Daily  mupirocin, 1 Application, Each Nare, BID  piperacillin-tazobactam, 4.5 g, Intravenous, Q8H  rosuvastatin, 40 mg, Oral, Nightly  senna-docusate sodium, 2 tablet, Oral, BID  sodium chloride, 10 mL, Intravenous, Q12H  sodium chloride, 10 mL, Intravenous, Q12H  sodium chloride, 10 mL, Intravenous, Q12H  sodium chloride, 10 mL, Intravenous, Q12H        norepinephrine, 0.02-0.3 mcg/kg/min, Last Rate: Stopped (03/14/25 0806)        Assessment & Plan       Hematuria    COPD (chronic obstructive pulmonary disease)    Hypertension    Coronary artery disease    CHF (congestive heart failure)    Renal mass    Presence of Watchman left atrial appendage closure device    Hemorrhagic shock    Right renal mass  Hematuria  Severe anemia  CKD  Atrial fibrillation with watchman 2/24/25  CAD - h/o LAD stent.   AAA, 3.6cm.   Dyspnea with edema and decreased breath sounds-chest x-ray is stable and breathing is better.    Hemoglobin is stable, he is off of Levophed which was stopped this morning.  We might be able to restart prettier on Sunday if hemoglobin remained stable and he remains off of pressors.  Will see again on Sunday.    Anna Sheth MD  03/14/25  10:32 EDT

## 2025-03-14 NOTE — THERAPY EVALUATION
Patient Name: Vijay Foote  : 1940    MRN: [id number]                              Today's Date: 3/14/2025       Admit Date: 3/10/2025    Visit Dx:     ICD-10-CM ICD-9-CM   1. Urinary retention  R33.9 788.20   2. Hematuria, unspecified type  R31.9 599.70   3. Tachycardia  R00.0 785.0   4. Elevated lactic acid level  R79.89 276.2   5. Malignant neoplasm of right kidney  C64.1 189.0   6. Abnormal CT of the abdomen  R93.5 793.6   7. Abdominal aortic aneurysm (AAA) without rupture, unspecified part  I71.40 441.4     Patient Active Problem List   Diagnosis    S/P reverse total shoulder arthroplasty, right    Postoperative hypotension    Hyperlipidemia    COPD (chronic obstructive pulmonary disease)    Hematuria    Hypertension    Coronary artery disease    CHF (congestive heart failure)    Renal mass    Presence of Watchman left atrial appendage closure device    Hemorrhagic shock     Past Medical History:   Diagnosis Date    Asthma     Cardiomyopathy     COPD (chronic obstructive pulmonary disease)     Coronary artery disease     Disease of thyroid gland     Hyperlipidemia     Hypertension      Past Surgical History:   Procedure Laterality Date    CARDIAC CATHETERIZATION      CYSTOSCOPY N/A 3/12/2025    Procedure: CYSTOSCOPY, clot evacuation, catheter insertion;  Surgeon: Chance Reyes MD;  [facility];  Service: Urology;  Laterality: N/A;    MASTOIDECTOMY Right     ORIF ANKLE FRACTURE Right     ROTATOR CUFF REPAIR Left     TONSILLECTOMY      TOTAL SHOULDER ARTHROPLASTY W/ DISTAL CLAVICLE EXCISION Right 2022    Procedure: RIGHT REVERSE TOTAL SHOULDER ARTHROPLASTY;  Surgeon: Wali Orellana MD;  Location[facility];  Service: Orthopedics;  Laterality: Right;    TRIGGER FINGER RELEASE Left       General Information       Row Name 25 1031          Physical Therapy Time and Intention    Document Type evaluation  -AR     Mode of Treatment physical therapy  -AR       Row Name  03/14/25 1031          General Information    Patient Profile Reviewed yes  -AR     Prior Level of Function independent:  no AD, no recent falls, drove [place] last Friday. normally on 3L NC at home  -AR     Existing Precautions/Restrictions fall  -AR     Barriers to Rehab none identified  -AR       Row Name 03/14/25 1031          Living Environment    Current Living Arrangements home  -AR     People in Home other (see comments);spouse  -AR       Row Name 03/14/25 1031          Home Main Entrance    Number of Stairs, Main Entrance three  -AR       Row Name 03/14/25 1031          Cognition    Orientation Status (Cognition) oriented x 3  -AR       Row Name 03/14/25 1031          Safety Issues/Impairments Affecting Functional Mobility    Impairments Affecting Function (Mobility) balance;endurance/activity tolerance;pain;shortness of breath;strength  -AR               User Key  (r) = Recorded By, (t) = Taken By, (c) = Cosigned By      Initials Name Provider Type    AR Maria G Flores, PT Physical Therapist                   Mobility       Row Name 03/14/25 1034          Bed Mobility    Bed Mobility supine-sit;sit-supine  -AR     Supine-Sit [place] (Bed Mobility) not tested  -AR     Sit-Supine [place] (Bed Mobility) not tested  -AR     Comment, (Bed Mobility) slept in recliner, normally sleeps in recliner  -AR       Row Name 03/14/25 1035          Sit-Stand Transfer    Sit-Stand [place] (Transfers) contact guard  -AR       Row Name 03/14/25 1035          Gait/Stairs (Locomotion)    [place] Level (Gait) contact guard  -AR     Assistive Device (Gait) walker, front-wheeled  -AR     Patient was able to Ambulate yes  -AR     Distance in Feet (Gait) 30  -AR     Deviations/Abnormal Patterns (Gait) gait speed decreased  carrying walker during  turns  -AR     Bilateral Gait Deviations forward flexed posture  -AR               User Key  (r) = Recorded By, (t) = Taken By, (c) = Cosigned By      Initials Name  Provider Type    AR Maria G Flores, PT Physical Therapist                   Obj/Interventions       Row Name 03/14/25 1035          Range of Motion Comprehensive    Comment, General Range of Motion B LE WFL  -AR       Row Name 03/14/25 1035          Strength Comprehensive (MMT)    Comment, General Manual Muscle Testing (MMT) Assessment B LE 4/5  -AR       Row Name 03/14/25 1035          Balance    Balance Assessment standing dynamic balance  -AR     Dynamic Standing Balance contact guard  -AR               User Key  (r) = Recorded By, (t) = Taken By, (c) = Cosigned By      Initials Name Provider Type    AR Maria G Flores, PT Physical Therapist                   Goals/Plan       Row Name 03/14/25 1043          Transfer Goal 1 (PT)    Activity/Assistive Device (Transfer Goal 1, PT) sit-to-stand/stand-to-sit;bed-to-chair/chair-to-bed  -AR     [place] Level/Cues Needed (Transfer Goal 1, PT) standby assist  -AR     Time Frame (Transfer Goal 1, PT) 1 week  -AR       Row Name 03/14/25 1043          Gait Training Goal 1 (PT)    Activity/Assistive Device (Gait Training Goal 1, PT) gait (walking locomotion)  -AR     [place] Level (Gait Training Goal 1, PT) standby assist  -AR     Distance (Gait Training Goal 1, PT) 200  -AR     Time Frame (Gait Training Goal 1, PT) 1 week  -AR       Row Name 03/14/25 1043          Therapy Assessment/Plan (PT)    Planned Therapy Interventions (PT) balance training;bed mobility training;gait training;home exercise program;neuromuscular re-education;transfer training;strengthening;stair training;patient/family education  -AR               User Key  (r) = Recorded By, (t) = Taken By, (c) = Cosigned By      Initials Name Provider Type    AR Maria G Flores, PT Physical Therapist                   Clinical Impression       Row Name 03/14/25 1035          Pain    Pretreatment Pain Rating 2/10  -AR     Posttreatment Pain Rating 5/10  -AR     Pain Location groin  catheter sit, Rn aware   -AR     Response to Pain Interventions activity participation with tolerable pain  -AR       Row Name 03/14/25 1035          Plan of Care Review    Outcome Evaluation Pt admitted with hematuria, s/p cystoscopy w/ clot evacuation 3/12,  h/o known renal mass.  Normally on 3L NC at home, lives w/ spouse, no use of AD, denies recent falls, drove to [place] last Friday per spouse.  Pt normally sleeps in a recliner.   Demonstrates generalized weakness, limited activity tolerance but abl eto stand w/ CGA.  Ambulated 30' w/ CGA, trialed RW due to report of not being up for few days but pt trying to carry the RW during turns.  Recommend ambulating in room /goodson w/ staff  as tolerated.  RN aware of slight redness on bottom, when pt stood, recommend waffle cushion and education pt/spouse about using remote on recliner to help w/ redistributing weight.  Anticipate DC to home with home PT.  -AR       Row Name 03/14/25 1035          Therapy Assessment/Plan (PT)    Rehab Potential (PT) good  -AR     Criteria for Skilled Interventions Met (PT) yes  -AR     Therapy Frequency (PT) 6 times/wk  -AR       Row Name 03/14/25 1035          Vital Signs    Pre Systolic BP Rehab 87  -AR     Pre Treatment Diastolic BP 60  -AR     Intra Systolic BP Rehab --  130s  -AR     Intra Treatment Diastolic BP --  70s  -AR     Post Systolic BP Rehab 115  -AR     Post Treatment Diastolic BP --  70s  -AR     O2 Delivery Pre Treatment supplemental O2  -AR     O2 Delivery Intra Treatment supplemental O2  -AR     O2 Delivery Post Treatment supplemental O2  -AR       Row Name 03/14/25 1035          Positioning and Restraints    Pre-Treatment Position sitting in chair/recliner  -AR     Post Treatment Position chair  -AR     In Chair notified nsg;reclined;sitting;call light within reach;encouraged to call for assist;exit alarm on;with family/caregiver  -AR               User Key  (r) = Recorded By, (t) = Taken By, (c) = Cosigned By      Initials Name Provider  Type    AR Maria G Flores, PT Physical Therapist                   Outcome Measures       Row Name 03/14/25 1043          How much help from another person do you currently need...    Turning from your back to your side while in flat bed without using bedrails? 3  -AR     Moving from lying on back to sitting on the side of a flat bed without bedrails? 3  -AR     Moving to and from a bed to a chair (including a wheelchair)? 3  -AR     Standing up from a chair using your arms (e.g., wheelchair, bedside chair)? 3  -AR     Climbing 3-5 steps with a railing? 2  -AR     To walk in hospital room? 3  -AR     AM-PAC 6 Clicks Score (PT) 17  -AR     Highest Level of Mobility Goal 5 --> Static standing  -AR       Row Name 03/14/25 1043          Functional Assessment    Outcome Measure Options AM-PAC 6 Clicks Basic Mobility (PT)  -AR               User Key  (r) = Recorded By, (t) = Taken By, (c) = Cosigned By      Initials Name Provider Type    AR Maria G Flores PT Physical Therapist                                 Physical Therapy Education       Title: PT OT SLP Therapies (In Progress)       Topic: Physical Therapy (In Progress)       Point: Mobility training (In Progress)       Learning Progress Summary            Patient Acceptance, E, NR by AR at 3/14/2025 1044                      Point: Home exercise program (In Progress)       Learning Progress Summary            Patient Acceptance, E, NR by AR at 3/14/2025 1044                      Point: Body mechanics (In Progress)       Learning Progress Summary            Patient Acceptance, E, NR by AR at 3/14/2025 1044                      Point: Precautions (In Progress)       Learning Progress Summary            Patient Acceptance, E, NR by AR at 3/14/2025 1044                                      User Key       Initials Effective Dates Name Provider Type Discipline    AR 06/16/21 -  Maria G Flores, PT Physical Therapist PT                  PT Recommendation and  Plan  Planned Therapy Interventions (PT): balance training, bed mobility training, gait training, home exercise program, neuromuscular re-education, transfer training, strengthening, stair training, patient/family education  Outcome Evaluation: Pt admitted with hematuria, s/p cystoscopy w/ clot evacuation 3/12,  h/o known renal mass.  Normally on 3L NC at home, lives w/ spouse, no use of AD, denies recent falls, drove to [place] last Friday per spouse.  Pt normally sleeps in a recliner.   Demonstrates generalized weakness, limited activity tolerance but abl eto stand w/ CGA.  Ambulated 30' w/ CGA, trialed RW due to report of not being up for few days but pt trying to carry the RW during turns.  Recommend ambulating in room /goodson w/ staff  as tolerated.  RN aware of slight redness on bottom, when pt stood, recommend waffle cushion and education pt/spouse about using remote on recliner to help w/ redistributing weight.  Anticipate DC to home with home PT.     Time Calculation:         PT Charges       Row Name 03/14/25 1031             Time Calculation    Start Time 0923  -AR      Stop Time 1001  -AR      Time Calculation (min) 38 min  -AR      PT Received On 03/14/25  -AR      PT - Next Appointment 03/16/25  -AR      PT Goal Re-Cert Due Date 03/21/25  -AR                User Key  (r) = Recorded By, (t) = Taken By, (c) = Cosigned By      Initials Name Provider Type    AR Maria G Flores, PT Physical Therapist                  Therapy Charges for Today       Code Description Service Date Service Provider Modifiers Qty    27612083315 HC PT EVAL MOD COMPLEXITY 3 3/14/2025 Maria G Flores, PT GP 1    69468414699 HC PT THER PROC EA 15 MIN 3/14/2025 Maria G Flores, PT GP 1            PT G-Codes  Outcome Measure Options: AM-PAC 6 Clicks Basic Mobility (PT)  AM-PAC 6 Clicks Score (PT): 17  PT Discharge Summary  Anticipated Discharge Disposition (PT): home with assist, home with home health (currently not ready but  anticipate progress)    Maria G Flores, PT  3/14/2025

## 2025-03-14 NOTE — PROGRESS NOTES
[facility]  [phone]  Dr. Dony Spears     Subjective:  LOS: 3    Chief Complaint: Hematuria    Patient doing well today.  Is hopefully going back for MRI abdomen.  No acute events overnight.    Objective   Vital Signs past 24hrs  Temp range: Temp (24hrs), Av.6 °F (36.4 °C), Min:97.4 °F (36.3 °C), Max:98.1 °F (36.7 °C)    BP range: BP: ()/(45-98) 96/75  Pulse range: Heart Rate:  [] 113  Resp rate range: Resp:  [20] 20  Device (Oxygen Therapy): nasal cannulaFlow (L/min) (Oxygen Therapy):  [3-4] 4  Oxygen range:SpO2:  [89 %-99 %] 92 %   Mechanical Ventilator:     Physical Exam  Vitals and nursing note reviewed.   Constitutional:       General: He is not in acute distress.  HENT:      Head: Normocephalic and atraumatic.   Cardiovascular:      Rate and Rhythm: Normal rate and regular rhythm.      Heart sounds: No murmur heard.  Pulmonary:      Effort: Pulmonary effort is normal. No respiratory distress.      Breath sounds: Normal breath sounds. No wheezing, rhonchi or rales.   Abdominal:      General: Abdomen is flat.      Tenderness: There is no abdominal tenderness.   Skin:     General: Skin is warm and dry.      Findings: No rash.   Neurological:      Mental Status: He is alert.       Results Review:    I have reviewed the laboratory and imaging data since the last note by [facility] physician.  My annotations are noted in assessment and plan.      Result Review:  I have personally reviewed the results from last note by [facility] physician to 3/14/2025 18:11 EDT and agree with these findings:  [x]  Laboratory list / accordion  [x]  Microbiology  [x]  Radiology  [x]  EKG/Telemetry   [x]  Cardiology/Vascular   [x]  Pathology  [x]  Old records  []  Other:    Medication Review:  I have reviewed the current MAR.  My annotations are noted in assessment and plan.    arformoterol, 15 mcg, Nebulization, BID - RT  budesonide, 1 mg, Nebulization, BID  famotidine, 20 mg, Oral, BID  AC  ipratropium-albuterol, 3 mL, Nebulization, 4x Daily  levothyroxine, 125 mcg, Oral, Daily  mupirocin, 1 Application, Each Nare, BID  piperacillin-tazobactam, 4.5 g, Intravenous, Q8H  rosuvastatin, 40 mg, Oral, Nightly  sodium chloride, 10 mL, Intravenous, Q12H  sodium chloride, 10 mL, Intravenous, Q12H  sodium chloride, 10 mL, Intravenous, Q12H  sodium chloride, 10 mL, Intravenous, Q12H        norepinephrine, 0.02-0.3 mcg/kg/min, Last Rate: 0.02 mcg/kg/min (03/14/25 1756)      Lines, Drains & Airways       Active LDAs       Name Placement date Placement time Site Days    CVC Triple Lumen 03/11/25 Right Internal jugular 03/11/25  0945  Internal jugular  3    Peripheral IV 03/10/25 2307 Right Antecubital 03/10/25  2307  Antecubital  3    Continuous Bladder Irrigation Triple-lumen 24 Fr 03/12/25  0232  Triple-lumen  2                  No active isolations  Diet Orders (active) (From admission, onward)       Start     Ordered    03/13/25 1200  Dietary Nutrition Supplements Boost Glucose Control (Glucerna Shake); chocolate  Daily With Breakfast & Lunch       03/13/25 1146    03/12/25 0911  Diet: Cardiac; Healthy Heart (2-3 Na+); Fluid Consistency: Thin (IDDSI 0)  Diet Effective Now         03/12/25 0910                      Assessment  Shock-likely hemorrhagic cannot rule out septic  Acute blood loss anemia  Hematuria  Right renal mass  Left nephrolithiasis  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  Known history of right renal mass and recurrent hematuria.  Had recent Watchman device placed for A-fib on 2/24/2025 so he could stop anticoagulation and was subsequently placed on DAPT for 6  months.  Had rapid response on the floor due to hypotension and blood loss.  - Transfusing blood and platelets as indicated  - Vasopressors currently weaned off  - Continue empiric antibiotics for sepsis  - Urology consulted and following regarding hematuria and right renal mass.  Went for cystoscopy on morning of 3/12/2025 where he was found to have a large posterior membranous/prostatic urethral false passage therefore the catheter was repositioned into the bladder. Hematuria now resoled and CBI stopped  - MRI abdomen ordered to further evaluate mass and tumor thrombus.  - Cardiology consulted given need to hold DAPT. They are assisting in resumption of this soon.   -Resume DVT prophylaxis today  - ICU core measures.  Likely downgrade out of ICU tomorrow      Dony Spears DO   03/14/25  18:11 EDT      Part of this note may be an electronic transcription/translation of spoken language to printed text using the Dragon Dictation System.

## 2025-03-14 NOTE — PLAN OF CARE
Goal Outcome Evaluation:  Pt A&Ox4. Around 12MN, pt complained of increasing shortness of breath while trying to get out of bed feeling the need to urinate. Pt while sitting up also was tachycardic with short runs of Vtach, Pacemaker with good capture and pacing. Pulm made aware. CXR showed pleural effusions. Furosemide 40 mg given as ordered. Pt had 3100ml of urine out this shift. BP soft and restarted on low dose Levo. Pt requested to sit and sleep on the recliner as what he would usually sleep on at home. Pt slept well with vitals stable while in the recliner. Phosporus replaced this shift. Hgb stable. Plan of care ongoing. Wife updated at bedside.

## 2025-03-15 LAB
ALBUMIN SERPL-MCNC: 2.6 G/DL (ref 3.5–5.2)
ALBUMIN/GLOB SERPL: 1.2 G/DL
ALP SERPL-CCNC: 41 U/L (ref 39–117)
ALT SERPL W P-5'-P-CCNC: 12 U/L (ref 1–41)
ANION GAP SERPL CALCULATED.3IONS-SCNC: 7 MMOL/L (ref 5–15)
AST SERPL-CCNC: 22 U/L (ref 1–40)
BACTERIA SPEC AEROBE CULT: NORMAL
BACTERIA SPEC AEROBE CULT: NORMAL
BASOPHILS # BLD AUTO: 0 10*3/MM3 (ref 0–0.2)
BASOPHILS # BLD AUTO: 0.01 10*3/MM3 (ref 0–0.2)
BASOPHILS # BLD AUTO: 0.01 10*3/MM3 (ref 0–0.2)
BASOPHILS NFR BLD AUTO: 0 % (ref 0–1.5)
BASOPHILS NFR BLD AUTO: 0.1 % (ref 0–1.5)
BASOPHILS NFR BLD AUTO: 0.2 % (ref 0–1.5)
BILIRUB SERPL-MCNC: 0.5 MG/DL (ref 0–1.2)
BUN SERPL-MCNC: 9 MG/DL (ref 8–23)
BUN/CREAT SERPL: 10.8 (ref 7–25)
CALCIUM SPEC-SCNC: 8.3 MG/DL (ref 8.6–10.5)
CHLORIDE SERPL-SCNC: 105 MMOL/L (ref 98–107)
CO2 SERPL-SCNC: 27 MMOL/L (ref 22–29)
CREAT SERPL-MCNC: 0.83 MG/DL (ref 0.76–1.27)
DEPRECATED RDW RBC AUTO: 47 FL (ref 37–54)
DEPRECATED RDW RBC AUTO: 50.4 FL (ref 37–54)
DEPRECATED RDW RBC AUTO: 51.6 FL (ref 37–54)
EGFRCR SERPLBLD CKD-EPI 2021: 87.4 ML/MIN/1.73
EOSINOPHIL # BLD AUTO: 0 10*3/MM3 (ref 0–0.4)
EOSINOPHIL NFR BLD AUTO: 0 % (ref 0.3–6.2)
ERYTHROCYTE [DISTWIDTH] IN BLOOD BY AUTOMATED COUNT: 14.5 % (ref 12.3–15.4)
ERYTHROCYTE [DISTWIDTH] IN BLOOD BY AUTOMATED COUNT: 15.1 % (ref 12.3–15.4)
ERYTHROCYTE [DISTWIDTH] IN BLOOD BY AUTOMATED COUNT: 15.2 % (ref 12.3–15.4)
GLOBULIN UR ELPH-MCNC: 2.2 GM/DL
GLUCOSE SERPL-MCNC: 101 MG/DL (ref 65–99)
HCT VFR BLD AUTO: 25.7 % (ref 37.5–51)
HCT VFR BLD AUTO: 28.1 % (ref 37.5–51)
HCT VFR BLD AUTO: 28.7 % (ref 37.5–51)
HGB BLD-MCNC: 8.3 G/DL (ref 13–17.7)
HGB BLD-MCNC: 8.6 G/DL (ref 13–17.7)
HGB BLD-MCNC: 9.4 G/DL (ref 13–17.7)
IMM GRANULOCYTES # BLD AUTO: 0.04 10*3/MM3 (ref 0–0.05)
IMM GRANULOCYTES NFR BLD AUTO: 0.6 % (ref 0–0.5)
LYMPHOCYTES # BLD AUTO: 0.63 10*3/MM3 (ref 0.7–3.1)
LYMPHOCYTES # BLD AUTO: 0.65 10*3/MM3 (ref 0.7–3.1)
LYMPHOCYTES # BLD AUTO: 0.66 10*3/MM3 (ref 0.7–3.1)
LYMPHOCYTES NFR BLD AUTO: 10.3 % (ref 19.6–45.3)
LYMPHOCYTES NFR BLD AUTO: 9.6 % (ref 19.6–45.3)
LYMPHOCYTES NFR BLD AUTO: 9.9 % (ref 19.6–45.3)
MAGNESIUM SERPL-MCNC: 1.8 MG/DL (ref 1.6–2.4)
MCH RBC QN AUTO: 28.3 PG (ref 26.6–33)
MCH RBC QN AUTO: 28.8 PG (ref 26.6–33)
MCH RBC QN AUTO: 29.6 PG (ref 26.6–33)
MCHC RBC AUTO-ENTMCNC: 30.6 G/DL (ref 31.5–35.7)
MCHC RBC AUTO-ENTMCNC: 32.3 G/DL (ref 31.5–35.7)
MCHC RBC AUTO-ENTMCNC: 32.8 G/DL (ref 31.5–35.7)
MCV RBC AUTO: 89.2 FL (ref 79–97)
MCV RBC AUTO: 90.3 FL (ref 79–97)
MCV RBC AUTO: 92.4 FL (ref 79–97)
MONOCYTES # BLD AUTO: 1.07 10*3/MM3 (ref 0.1–0.9)
MONOCYTES # BLD AUTO: 1.07 10*3/MM3 (ref 0.1–0.9)
MONOCYTES # BLD AUTO: 1.11 10*3/MM3 (ref 0.1–0.9)
MONOCYTES NFR BLD AUTO: 16.4 % (ref 5–12)
MONOCYTES NFR BLD AUTO: 16.6 % (ref 5–12)
MONOCYTES NFR BLD AUTO: 17 % (ref 5–12)
NEUTROPHILS NFR BLD AUTO: 4.55 10*3/MM3 (ref 1.7–7)
NEUTROPHILS NFR BLD AUTO: 4.78 10*3/MM3 (ref 1.7–7)
NEUTROPHILS NFR BLD AUTO: 4.87 10*3/MM3 (ref 1.7–7)
NEUTROPHILS NFR BLD AUTO: 72.1 % (ref 42.7–76)
NEUTROPHILS NFR BLD AUTO: 72.8 % (ref 42.7–76)
NEUTROPHILS NFR BLD AUTO: 73.2 % (ref 42.7–76)
NRBC BLD AUTO-RTO: 0 /100 WBC (ref 0–0.2)
PHOSPHATE SERPL-MCNC: 2 MG/DL (ref 2.5–4.5)
PHOSPHATE SERPL-MCNC: 2.4 MG/DL (ref 2.5–4.5)
PLATELET # BLD AUTO: 150 10*3/MM3 (ref 140–450)
PLATELET # BLD AUTO: 151 10*3/MM3 (ref 140–450)
PLATELET # BLD AUTO: 152 10*3/MM3 (ref 140–450)
PMV BLD AUTO: 9 FL (ref 6–12)
PMV BLD AUTO: 9.2 FL (ref 6–12)
PMV BLD AUTO: 9.5 FL (ref 6–12)
POTASSIUM SERPL-SCNC: 3.7 MMOL/L (ref 3.5–5.2)
PROT SERPL-MCNC: 4.8 G/DL (ref 6–8.5)
QT INTERVAL: 369 MS
QTC INTERVAL: 564 MS
RBC # BLD AUTO: 2.88 10*6/MM3 (ref 4.14–5.8)
RBC # BLD AUTO: 3.04 10*6/MM3 (ref 4.14–5.8)
RBC # BLD AUTO: 3.18 10*6/MM3 (ref 4.14–5.8)
SODIUM SERPL-SCNC: 139 MMOL/L (ref 136–145)
WBC NRBC COR # BLD AUTO: 6.31 10*3/MM3 (ref 3.4–10.8)
WBC NRBC COR # BLD AUTO: 6.53 10*3/MM3 (ref 3.4–10.8)
WBC NRBC COR # BLD AUTO: 6.69 10*3/MM3 (ref 3.4–10.8)

## 2025-03-15 PROCEDURE — 25010000002 PIPERACILLIN SOD-TAZOBACTAM PER 1 G: Performed by: UROLOGY

## 2025-03-15 PROCEDURE — 25010000002 HEPARIN (PORCINE) PER 1000 UNITS: Performed by: STUDENT IN AN ORGANIZED HEALTH CARE EDUCATION/TRAINING PROGRAM

## 2025-03-15 PROCEDURE — 85025 COMPLETE CBC W/AUTO DIFF WBC: CPT | Performed by: UROLOGY

## 2025-03-15 PROCEDURE — 94761 N-INVAS EAR/PLS OXIMETRY MLT: CPT

## 2025-03-15 PROCEDURE — 94799 UNLISTED PULMONARY SVC/PX: CPT

## 2025-03-15 PROCEDURE — 25010000002 PIPERACILLIN SOD-TAZOBACTAM PER 1 G: Performed by: INTERNAL MEDICINE

## 2025-03-15 PROCEDURE — 94760 N-INVAS EAR/PLS OXIMETRY 1: CPT

## 2025-03-15 PROCEDURE — 80053 COMPREHEN METABOLIC PANEL: CPT | Performed by: UROLOGY

## 2025-03-15 PROCEDURE — 84100 ASSAY OF PHOSPHORUS: CPT | Performed by: HOSPITALIST

## 2025-03-15 PROCEDURE — 84100 ASSAY OF PHOSPHORUS: CPT | Performed by: STUDENT IN AN ORGANIZED HEALTH CARE EDUCATION/TRAINING PROGRAM

## 2025-03-15 PROCEDURE — 94664 DEMO&/EVAL PT USE INHALER: CPT

## 2025-03-15 PROCEDURE — 83735 ASSAY OF MAGNESIUM: CPT | Performed by: UROLOGY

## 2025-03-15 RX ADMIN — Medication 10 ML: at 09:04

## 2025-03-15 RX ADMIN — MUPIROCIN 1 APPLICATION: 20 OINTMENT TOPICAL at 09:03

## 2025-03-15 RX ADMIN — BUDESONIDE 1 MG: 1 INHALANT ORAL at 19:34

## 2025-03-15 RX ADMIN — ROSUVASTATIN CALCIUM 40 MG: 20 TABLET, FILM COATED ORAL at 21:12

## 2025-03-15 RX ADMIN — Medication 10 ML: at 21:19

## 2025-03-15 RX ADMIN — HEPARIN SODIUM 5000 UNITS: 5000 INJECTION INTRAVENOUS; SUBCUTANEOUS at 05:51

## 2025-03-15 RX ADMIN — IPRATROPIUM BROMIDE AND ALBUTEROL SULFATE 3 ML: .5; 3 SOLUTION RESPIRATORY (INHALATION) at 11:29

## 2025-03-15 RX ADMIN — ARFORMOTEROL TARTRATE 15 MCG: 15 SOLUTION RESPIRATORY (INHALATION) at 19:32

## 2025-03-15 RX ADMIN — LEVOTHYROXINE SODIUM 125 MCG: 125 TABLET ORAL at 09:02

## 2025-03-15 RX ADMIN — Medication 2 PACKET: at 05:51

## 2025-03-15 RX ADMIN — FAMOTIDINE 20 MG: 20 TABLET, FILM COATED ORAL at 17:27

## 2025-03-15 RX ADMIN — MUPIROCIN 1 APPLICATION: 20 OINTMENT TOPICAL at 21:11

## 2025-03-15 RX ADMIN — BUDESONIDE 1 MG: 1 INHALANT ORAL at 08:03

## 2025-03-15 RX ADMIN — IPRATROPIUM BROMIDE AND ALBUTEROL SULFATE 3 ML: .5; 3 SOLUTION RESPIRATORY (INHALATION) at 08:07

## 2025-03-15 RX ADMIN — HEPARIN SODIUM 5000 UNITS: 5000 INJECTION INTRAVENOUS; SUBCUTANEOUS at 14:29

## 2025-03-15 RX ADMIN — HEPARIN SODIUM 5000 UNITS: 5000 INJECTION INTRAVENOUS; SUBCUTANEOUS at 21:11

## 2025-03-15 RX ADMIN — Medication 10 ML: at 09:03

## 2025-03-15 RX ADMIN — PIPERACILLIN SODIUM AND TAZOBACTAM SODIUM 4.5 G: 4; .5 INJECTION, POWDER, LYOPHILIZED, FOR SOLUTION INTRAVENOUS at 04:01

## 2025-03-15 RX ADMIN — FAMOTIDINE 20 MG: 20 TABLET, FILM COATED ORAL at 09:02

## 2025-03-15 RX ADMIN — PIPERACILLIN SODIUM AND TAZOBACTAM SODIUM 4.5 G: 4; .5 INJECTION, POWDER, LYOPHILIZED, FOR SOLUTION INTRAVENOUS at 19:47

## 2025-03-15 RX ADMIN — ARFORMOTEROL TARTRATE 15 MCG: 15 SOLUTION RESPIRATORY (INHALATION) at 08:08

## 2025-03-15 RX ADMIN — PIPERACILLIN SODIUM AND TAZOBACTAM SODIUM 4.5 G: 4; .5 INJECTION, POWDER, LYOPHILIZED, FOR SOLUTION INTRAVENOUS at 11:34

## 2025-03-15 NOTE — PROGRESS NOTES
[facility]  [phone]  Dr. David Coronado    Subjective:  LOS: 4    Chief Complaint: Hematuria    He still gets lightheaded when he gets up to go to the restroom.  Feels weak.  Having diarrhea.  No hematuria reported.    Objective   Vital Signs past 24hrs  Temp range: Temp (24hrs), Av.8 °F (36.6 °C), Min:97.5 °F (36.4 °C), Max:98.2 °F (36.8 °C)    BP range: BP: ()/() 102/69  Pulse range: Heart Rate:  [] 92  Resp rate range: Resp:  [16-23] 16  Device (Oxygen Therapy): nasal cannulaFlow (L/min) (Oxygen Therapy):  [1-4] 1  Oxygen range:SpO2:  [90 %-100 %] 100 %   Mechanical Ventilator:     Physical Exam  Eyes:      Pupils: Pupils are equal, round, and reactive to light.   Cardiovascular:      Rate and Rhythm: Normal rate and regular rhythm.      Heart sounds: No murmur heard.  Pulmonary:      Effort: Pulmonary effort is normal.      Breath sounds: Decreased breath sounds present.   Abdominal:      General: Bowel sounds are normal.      Palpations: Abdomen is soft. There is no mass.      Tenderness: There is no abdominal tenderness.   Musculoskeletal:         General: Swelling (mild) present.   Neurological:      Mental Status: He is alert.       Results Review:    I have reviewed the laboratory and imaging data since the last note by [facility] physician.  My annotations are noted in assessment and plan.      Result Review:  I have personally reviewed the results from last note by [facility] physician to 3/15/2025 14:32 EDT and agree with these findings:  [x]  Laboratory list / accordion  [x]  Microbiology  [x]  Radiology  []  EKG/Telemetry   []  Cardiology/Vascular   []  Pathology  []  Old records  []  Other:      Medication Review:  I have reviewed the current MAR.  My annotations are noted in assessment and plan.    arformoterol, 15 mcg, Nebulization, BID - RT  budesonide, 1 mg, Nebulization, BID  famotidine, 20 mg, Oral, BID AC  heparin (porcine), 5,000 Units, Subcutaneous,  Q8H  ipratropium-albuterol, 3 mL, Nebulization, 4x Daily  levothyroxine, 125 mcg, Oral, Daily  mupirocin, 1 Application, Each Nare, BID  piperacillin-tazobactam, 4.5 g, Intravenous, Q8H  rosuvastatin, 40 mg, Oral, Nightly  sodium chloride, 10 mL, Intravenous, Q12H  sodium chloride, 10 mL, Intravenous, Q12H  sodium chloride, 10 mL, Intravenous, Q12H  sodium chloride, 10 mL, Intravenous, Q12H        norepinephrine, 0.02-0.3 mcg/kg/min, Last Rate: Stopped (03/14/25 2034)      Lines, Drains & Airways       Active LDAs       Name Placement date Placement time Site Days    CVC Triple Lumen 03/11/25 Right Internal jugular 03/11/25  0945  Internal jugular  4    Peripheral IV 03/10/25 2307 Right Antecubital 03/10/25  2307  Antecubital  4    Continuous Bladder Irrigation Triple-lumen 24 Fr 03/12/25  0232  Triple-lumen  3                    PCCM Problems  Hypotension likely hypovolemic due to blood loss  Acute blood loss anemia  Hematuria  Right renal mass  Left nephrolithiasis  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    Plan of Treatment    No further hematuria and blood pressure is no longer requiring pressors.  No evidence of active blood loss at this time.    A-fib and status post watchman.  DAPT on hold currently.  Note plans by cardiology to resume tomorrow.    Already back on subcutaneous heparin for DVT prophylaxis.    COPD without exacerbation.  On Brovana twice a day.    Continue home CPAP use.    Continue home oxygen flows.    Stepdown to telemetry floor.    Requires MRI for renal mass.  Has difficulty laying flat.  Can try with Xanax etc.    Currently on empiric antibiotics for sepsis.  No evidence of infection on cultures.  Will  stop antibiotics after today.    David Coronado MD  03/15/25  14:32 EDT    Isolation status: No active isolations    Dietary Orders (From admission, onward)       Start     Ordered    03/13/25 1200  Dietary Nutrition Supplements Boost Glucose Control (Glucerna Shake); chocolate  Daily With Breakfast & Lunch      Question Answer Comment   Select Supplement: Boost Glucose Control (Glucerna Shake)    Flavor: chocolate        03/13/25 1146    03/12/25 0911  Diet: Cardiac; Healthy Heart (2-3 Na+); Fluid Consistency: Thin (IDDSI 0)  Diet Effective Now        References:    Diet Order Definitions   Question Answer Comment   Diets: Cardiac    Cardiac Diet: Healthy Heart (2-3 Na+)    Fluid Consistency: Thin (IDDSI 0)        03/12/25 0910                    Part of this note may be an electronic transcription/translation of spoken language to printed text using the Dragon Dictation System.

## 2025-03-15 NOTE — PROGRESS NOTES
[facility] DAILY PROGRESS NOTE      Name: Vijay Foote  Age: 84 y.o.  Sex: male  :  1940  MRN: [id number]    Date: 3/15/2025               Patient appears well this morning. Resting in chair. Wife at the bedside. No new complaints overnight. His urine output appears yellow. No visible hematuria. Wife reports that they will be resuming patient's oral anticoagulation within the next 1-2 days.     - Afebrile, hypertensive, otherwise VSS  - WBC - 6.31 (7.33)  - Hgb - 8.3 (8.6)  - Cr - 0.83 (0.97)    Physical Exam:    General Appearance:    Resting, NAD   :      Rocha catheter in place draining yellow urine   Neuro/Psych:   Orientation intact, mood/affect pleasant       Assessment:    Gross hematuria  Right renal mass    Plan:  - Continue indwelling rocha  - MRI not yet completed. Patient did not tolerate.   - Urology following    Tram Frank, APRN  3/15/2025    Plan reviewed and discussed with Dr. Hernandez

## 2025-03-15 NOTE — PROGRESS NOTES
Name: Vijay Foote ADMIT: 3/10/2025   : 1940  PCP: German Mccray MD    MRN: [id number] LOS: 4 days   AGE/SEX: 84 y.o. male  ROOM: [facility]     Subjective   Subjective   No more hematuria. He is having loose stools. Has been on twice a day bowel medications. Very weak needing a lot of help to get to the bathroom and back. Wife at bedside     Objective   Objective   Vital Signs  Temp:  [97.5 °F (36.4 °C)-98.2 °F (36.8 °C)] 98.1 °F (36.7 °C)  Heart Rate:  [] 92  Resp:  [16-23] 16  BP: ()/() 102/69  SpO2:  [90 %-100 %] 100 %  on  Flow (L/min) (Oxygen Therapy):  [1-3] 1;   Device (Oxygen Therapy): nasal cannula  Body mass index is 23.49 kg/m².    Physical Exam  Constitutional:       General: He is not in acute distress.     Appearance: He is ill-appearing. He is not toxic-appearing.   HENT:      Head: Normocephalic and atraumatic.   Cardiovascular:      Rate and Rhythm: Normal rate and regular rhythm.   Pulmonary:      Effort: Pulmonary effort is normal. No respiratory distress.   Abdominal:      Tenderness: There is no abdominal tenderness.   Neurological:      Mental Status: He is alert.      Motor: Weakness present.     Results Review  I reviewed the patient's new clinical results.  Results from last 7 days   Lab Units 03/15/25  1527 03/15/25  0425 25  2046 25  1616   WBC 10*3/mm3 6.53 6.31 7.33 8.57   HEMOGLOBIN g/dL 8.6* 8.3* 8.6* 9.5*   PLATELETS 10*3/mm3 150 151 129* 141     Results from last 7 days   Lab Units 03/15/25  0425 25  0046 25  0437 25  0541   SODIUM mmol/L 139 143 144 142   POTASSIUM mmol/L 3.7 3.9 4.0 4.4   CHLORIDE mmol/L 105 110* 115* 111*   CO2 mmol/L 27.0 23.0 22.0 19.9*   BUN mg/dL 9 11 14 24*   CREATININE mg/dL 0.83 0.97 0.81 0.98   GLUCOSE mg/dL 101* 141* 122* 146*     Lab Results   Component Value Date    ANIONGAP 7.0 03/15/2025     Estimated Creatinine Clearance: 66 mL/min (by C-G formula based on SCr of 0.83 mg/dL).   Lab  "Results   Component Value Date    EGFR 87.4 03/15/2025     Results from last 7 days   Lab Units 03/15/25  0425 03/14/25  0046 03/13/25  0437 03/12/25  0541   ALBUMIN g/dL 2.6* 2.8* 2.4* 2.4*   BILIRUBIN mg/dL 0.5 0.5 0.7 0.6   ALK PHOS U/L 41 50 40 40   AST (SGOT) U/L 22 25 20 19   ALT (SGPT) U/L 12 12 9 9     Results from last 7 days   Lab Units 03/15/25  0425 03/14/25  2046 03/14/25  1024 03/14/25  0046 03/13/25  1501 03/13/25  0437 03/12/25  0541   CALCIUM mg/dL 8.3*  --   --  8.5*  --  8.0* 7.7*   ALBUMIN g/dL 2.6*  --   --  2.8*  --  2.4* 2.4*   MAGNESIUM mg/dL 1.8  --   --  1.8  --  2.2 1.8   PHOSPHORUS mg/dL 2.0* 1.4* 1.7* 1.9*   < > 1.5* 2.5    < > = values in this interval not displayed.     Results from last 7 days   Lab Units 03/11/25  1214 03/11/25  0735 03/10/25  2306 03/10/25  2205 03/10/25  1743   PROCALCITONIN ng/mL  --   --  0.18  --   --    LACTATE mmol/L 1.7 1.2  --  1.6 3.4*     No results found for: \"HGBA1C\", \"POCGLU\"      XR Chest 1 View  Result Date: 3/14/2025   1. Stable findings within the chest. 2. No evidence of bowel obstruction.  This report was finalized on 3/14/2025 12:57 AM by Dr. Lili Vann M.D on Workstation: [id number]      XR Abdomen KUB  Result Date: 3/14/2025   1. Stable findings within the chest. 2. No evidence of bowel obstruction.  This report was finalized on 3/14/2025 12:57 AM by Dr. Lili Vann M.D on Workstation: [id number]      XR Chest 1 View  Result Date: 3/13/2025   1. New small pleural effusions. 2. New basilar lung opacities. 3. Suspect airways disease  This report was finalized on 3/13/2025 5:40 PM by Dr. Manoj Page M.D on Workstation: [id number]        Scheduled Meds  arformoterol, 15 mcg, Nebulization, BID - RT  budesonide, 1 mg, Nebulization, BID  famotidine, 20 mg, Oral, BID AC  heparin (porcine), 5,000 Units, Subcutaneous, Q8H  levothyroxine, 125 mcg, Oral, Daily  mupirocin, 1 Application, Each Nare, BID  piperacillin-tazobactam, 4.5 " g, Intravenous, Q8H  rosuvastatin, 40 mg, Oral, Nightly  sodium chloride, 10 mL, Intravenous, Q12H  sodium chloride, 10 mL, Intravenous, Q12H  sodium chloride, 10 mL, Intravenous, Q12H  sodium chloride, 10 mL, Intravenous, Q12H    Continuous Infusions     PRN Meds    acetaminophen    senna-docusate sodium **AND** polyethylene glycol **AND** bisacodyl **AND** bisacodyl    Calcium Replacement - Follow Nurse / BPA Driven Protocol    LORazepam    Magnesium Standard Dose Replacement - Follow Nurse / BPA Driven Protocol    nitroglycerin    ondansetron ODT **OR** ondansetron    ondansetron    Phosphorus Replacement - Follow Nurse / BPA Driven Protocol    Potassium Replacement - Follow Nurse / BPA Driven Protocol    sodium chloride    sodium chloride    sodium chloride    sodium chloride    sodium chloride    sodium chloride    sodium chloride    traZODone       Diet  Diet: Cardiac; Healthy Heart (2-3 Na+); Fluid Consistency: Thin (IDDSI 0)       Assessment/Plan     Active Hospital Problems    Diagnosis  POA    **Hematuria [R31.9]  Yes    Hemorrhagic shock [R57.8]  Unknown    Renal mass [N28.89]  Unknown    Presence of Watchman left atrial appendage closure device [Z95.818]  Unknown    CHF (congestive heart failure) [I50.9]  Yes    Hypertension [I10]  Yes    Coronary artery disease [I25.10]  Yes    COPD (chronic obstructive pulmonary disease) [J44.9]  Yes      Resolved Hospital Problems   No resolved problems to display.     Patient is a 84 y.o. male     Right renal mass  Gross hematuria, recurrent hematuria  Acute blood loss anemia and shock  Levophed currently off  Has received 5 total units PRBC (last 3/13/2025), 1 unit platelets. Hemoglobin stable  Cystoscopy bedside and then in OR 3/12/2025, clot evacuation. Status post CBI. Now has indwelling catheter  Antibiotics for possible sepsis  MRI to evaluate for possible IVC thrombus    A-fib status post Watchman  History of ICD  Anticoagulation switched to DAPT 6  months-currently on hold due to hematuria. Cardiology following. Recs monitor after restarting Plavix for a few days to make sure he does not rebleed    COPD  AISSATOU  Chronic hypoxic respiratory failure  Flow (L/min) (Oxygen Therapy):  [1-3] 1 (baseline 3 L/min)    Hypertension  Hyperlipidemia    DVT prophylaxis   SCDs    Discharge  TBD. Therapies following  Expected Discharge Date: 3/17/2025; Expected Discharge Time:     Discussed with patient, family, and nursing staff    Boris Espinoza MD  [facility]  03/15/25 15:47 EDT

## 2025-03-15 NOTE — PLAN OF CARE
Goal Outcome Evaluation:  Pt A&Ox4 overnight. Off of pressors able to maintain MAP>65 with some episodes of hypotension while in deep sleep. Pt up to bathroom x1-2assist with watery bowel. Phosporus replaced overnight. Urine output 900cc draining to clear yellow urine. Denies any pain or acute worsening shortness of breath overnight. Slept well on the recliner chair overnight. Pressure points cushion in place. MRI still pending.Plan of care ongoing. Wife updated at the bedside.

## 2025-03-16 LAB
ALBUMIN SERPL-MCNC: 2.6 G/DL (ref 3.5–5.2)
ALBUMIN/GLOB SERPL: 1.1 G/DL
ALP SERPL-CCNC: 42 U/L (ref 39–117)
ALT SERPL W P-5'-P-CCNC: 15 U/L (ref 1–41)
ANION GAP SERPL CALCULATED.3IONS-SCNC: 8 MMOL/L (ref 5–15)
AST SERPL-CCNC: 19 U/L (ref 1–40)
BASOPHILS # BLD AUTO: 0 10*3/MM3 (ref 0–0.2)
BASOPHILS NFR BLD AUTO: 0 % (ref 0–1.5)
BILIRUB SERPL-MCNC: 0.4 MG/DL (ref 0–1.2)
BUN SERPL-MCNC: 8 MG/DL (ref 8–23)
BUN/CREAT SERPL: 9.8 (ref 7–25)
CALCIUM SPEC-SCNC: 8.7 MG/DL (ref 8.6–10.5)
CHLORIDE SERPL-SCNC: 106 MMOL/L (ref 98–107)
CO2 SERPL-SCNC: 26 MMOL/L (ref 22–29)
CREAT SERPL-MCNC: 0.82 MG/DL (ref 0.76–1.27)
DEPRECATED RDW RBC AUTO: 49 FL (ref 37–54)
EGFRCR SERPLBLD CKD-EPI 2021: 87.7 ML/MIN/1.73
EOSINOPHIL # BLD AUTO: 0 10*3/MM3 (ref 0–0.4)
EOSINOPHIL NFR BLD AUTO: 0 % (ref 0.3–6.2)
ERYTHROCYTE [DISTWIDTH] IN BLOOD BY AUTOMATED COUNT: 15.1 % (ref 12.3–15.4)
GLOBULIN UR ELPH-MCNC: 2.4 GM/DL
GLUCOSE SERPL-MCNC: 102 MG/DL (ref 65–99)
HCT VFR BLD AUTO: 27.6 % (ref 37.5–51)
HGB BLD-MCNC: 8.8 G/DL (ref 13–17.7)
IMM GRANULOCYTES # BLD AUTO: 0.04 10*3/MM3 (ref 0–0.05)
IMM GRANULOCYTES NFR BLD AUTO: 0.7 % (ref 0–0.5)
LYMPHOCYTES # BLD AUTO: 0.69 10*3/MM3 (ref 0.7–3.1)
LYMPHOCYTES NFR BLD AUTO: 12.1 % (ref 19.6–45.3)
MAGNESIUM SERPL-MCNC: 1.9 MG/DL (ref 1.6–2.4)
MCH RBC QN AUTO: 28.4 PG (ref 26.6–33)
MCHC RBC AUTO-ENTMCNC: 31.9 G/DL (ref 31.5–35.7)
MCV RBC AUTO: 89 FL (ref 79–97)
MONOCYTES # BLD AUTO: 1.02 10*3/MM3 (ref 0.1–0.9)
MONOCYTES NFR BLD AUTO: 17.8 % (ref 5–12)
NEUTROPHILS NFR BLD AUTO: 3.97 10*3/MM3 (ref 1.7–7)
NEUTROPHILS NFR BLD AUTO: 69.4 % (ref 42.7–76)
NRBC BLD AUTO-RTO: 0 /100 WBC (ref 0–0.2)
PHOSPHATE SERPL-MCNC: 2.3 MG/DL (ref 2.5–4.5)
PLATELET # BLD AUTO: 162 10*3/MM3 (ref 140–450)
PMV BLD AUTO: 9.4 FL (ref 6–12)
POTASSIUM SERPL-SCNC: 3.8 MMOL/L (ref 3.5–5.2)
PROT SERPL-MCNC: 5 G/DL (ref 6–8.5)
RBC # BLD AUTO: 3.1 10*6/MM3 (ref 4.14–5.8)
SODIUM SERPL-SCNC: 140 MMOL/L (ref 136–145)
WBC NRBC COR # BLD AUTO: 5.72 10*3/MM3 (ref 3.4–10.8)

## 2025-03-16 PROCEDURE — 85025 COMPLETE CBC W/AUTO DIFF WBC: CPT | Performed by: UROLOGY

## 2025-03-16 PROCEDURE — 25010000002 HEPARIN (PORCINE) PER 1000 UNITS: Performed by: STUDENT IN AN ORGANIZED HEALTH CARE EDUCATION/TRAINING PROGRAM

## 2025-03-16 PROCEDURE — 94799 UNLISTED PULMONARY SVC/PX: CPT

## 2025-03-16 PROCEDURE — 99232 SBSQ HOSP IP/OBS MODERATE 35: CPT

## 2025-03-16 PROCEDURE — 97530 THERAPEUTIC ACTIVITIES: CPT

## 2025-03-16 PROCEDURE — 94761 N-INVAS EAR/PLS OXIMETRY MLT: CPT

## 2025-03-16 PROCEDURE — 83735 ASSAY OF MAGNESIUM: CPT | Performed by: UROLOGY

## 2025-03-16 PROCEDURE — 80053 COMPREHEN METABOLIC PANEL: CPT | Performed by: UROLOGY

## 2025-03-16 PROCEDURE — 94664 DEMO&/EVAL PT USE INHALER: CPT

## 2025-03-16 PROCEDURE — 84100 ASSAY OF PHOSPHORUS: CPT | Performed by: UROLOGY

## 2025-03-16 PROCEDURE — 97116 GAIT TRAINING THERAPY: CPT

## 2025-03-16 RX ORDER — ALBUTEROL SULFATE 0.83 MG/ML
2.5 SOLUTION RESPIRATORY (INHALATION) EVERY 6 HOURS PRN
Status: DISCONTINUED | OUTPATIENT
Start: 2025-03-16 | End: 2025-03-19 | Stop reason: HOSPADM

## 2025-03-16 RX ADMIN — Medication 10 ML: at 21:20

## 2025-03-16 RX ADMIN — ALBUTEROL SULFATE 2.5 MG: 2.5 SOLUTION RESPIRATORY (INHALATION) at 13:39

## 2025-03-16 RX ADMIN — BUDESONIDE 1 MG: 1 INHALANT ORAL at 08:19

## 2025-03-16 RX ADMIN — ARFORMOTEROL TARTRATE 15 MCG: 15 SOLUTION RESPIRATORY (INHALATION) at 19:10

## 2025-03-16 RX ADMIN — HEPARIN SODIUM 5000 UNITS: 5000 INJECTION INTRAVENOUS; SUBCUTANEOUS at 05:08

## 2025-03-16 RX ADMIN — FAMOTIDINE 20 MG: 20 TABLET, FILM COATED ORAL at 06:34

## 2025-03-16 RX ADMIN — ROSUVASTATIN CALCIUM 40 MG: 20 TABLET, FILM COATED ORAL at 21:07

## 2025-03-16 RX ADMIN — BUDESONIDE 1 MG: 1 INHALANT ORAL at 19:12

## 2025-03-16 RX ADMIN — ARFORMOTEROL TARTRATE 15 MCG: 15 SOLUTION RESPIRATORY (INHALATION) at 08:18

## 2025-03-16 RX ADMIN — HEPARIN SODIUM 5000 UNITS: 5000 INJECTION INTRAVENOUS; SUBCUTANEOUS at 21:06

## 2025-03-16 RX ADMIN — Medication 10 ML: at 21:19

## 2025-03-16 RX ADMIN — HEPARIN SODIUM 5000 UNITS: 5000 INJECTION INTRAVENOUS; SUBCUTANEOUS at 14:06

## 2025-03-16 RX ADMIN — FAMOTIDINE 20 MG: 20 TABLET, FILM COATED ORAL at 17:51

## 2025-03-16 RX ADMIN — LEVOTHYROXINE SODIUM 125 MCG: 125 TABLET ORAL at 08:28

## 2025-03-16 NOTE — SIGNIFICANT NOTE
Patient placed in supine position, head of bed lowered, line removed, sutures intact, occlusive dressing placed and pressure held for 5 minutes, hemostasis achieved.  Patient remained in supine position for 30 minutes.  Bandage assessed again and patient returned to sitting position.

## 2025-03-16 NOTE — PLAN OF CARE
Goal Outcome Evaluation:  Plan of Care Reviewed With: patient  Plan of Care Reviewed With: patient, spouse        Progress: improving  Outcome Evaluation: Patient ambulated with a rolling walker around the unit with CGA and with supplemental O2.  Some fatigued noted afterward but nice improvement in ambulation distance.

## 2025-03-16 NOTE — PROGRESS NOTES
Name: Vijay Foote ADMIT: 3/10/2025   : 1940  PCP: German Mccray MD    MRN: [id number] LOS: 5 days   AGE/SEX: 84 y.o. male  ROOM: [facility]     Subjective   Subjective   Denies any new complaints. Wife at bedside.     Objective   Objective   Vital Signs  Temp:  [97.6 °F (36.4 °C)-98.4 °F (36.9 °C)] 97.6 °F (36.4 °C)  Heart Rate:  [] 84  Resp:  [16-24] 16  BP: ()/(59-96) 107/63  SpO2:  [98 %-100 %] 99 %  on  Flow (L/min) (Oxygen Therapy):  [1-3] 3;   Device (Oxygen Therapy): nasal cannula  Body mass index is 23.49 kg/m².    Physical Exam  Constitutional:       General: He is not in acute distress.     Appearance: He is ill-appearing. He is not toxic-appearing.   HENT:      Head: Normocephalic and atraumatic.   Cardiovascular:      Rate and Rhythm: Normal rate and regular rhythm.   Pulmonary:      Effort: Pulmonary effort is normal. No respiratory distress.      Breath sounds: No wheezing or rhonchi.   Abdominal:      Tenderness: There is no abdominal tenderness.   Genitourinary:     Comments: Yellow urine in bag  Neurological:      Mental Status: He is alert.      Motor: Weakness present.     Results Review  I reviewed the patient's new clinical results.  Results from last 7 days   Lab Units 25  0513 03/15/25  2128 03/15/25  1527 03/15/25  0425   WBC 10*3/mm3 5.72 6.69 6.53 6.31   HEMOGLOBIN g/dL 8.8* 9.4* 8.6* 8.3*   PLATELETS 10*3/mm3 162 152 150 151     Results from last 7 days   Lab Units 25  0513 03/15/25  0425 25  0046 25  0437   SODIUM mmol/L 140 139 143 144   POTASSIUM mmol/L 3.8 3.7 3.9 4.0   CHLORIDE mmol/L 106 105 110* 115*   CO2 mmol/L 26.0 27.0 23.0 22.0   BUN mg/dL 8 9 11 14   CREATININE mg/dL 0.82 0.83 0.97 0.81   GLUCOSE mg/dL 102* 101* 141* 122*     Lab Results   Component Value Date    ANIONGAP 8.0 2025     Estimated Creatinine Clearance: 66.8 mL/min (by C-G formula based on SCr of 0.82 mg/dL).   Lab Results   Component Value Date    EGFR 87.7  "03/16/2025     Results from last 7 days   Lab Units 03/16/25  0513 03/15/25  0425 03/14/25  0046 03/13/25  0437   ALBUMIN g/dL 2.6* 2.6* 2.8* 2.4*   BILIRUBIN mg/dL 0.4 0.5 0.5 0.7   ALK PHOS U/L 42 41 50 40   AST (SGOT) U/L 19 22 25 20   ALT (SGPT) U/L 15 12 12 9     Results from last 7 days   Lab Units 03/16/25  0513 03/15/25  1527 03/15/25  0425 03/14/25  2046 03/14/25  1024 03/14/25  0046 03/13/25  1501 03/13/25  0437   CALCIUM mg/dL 8.7  --  8.3*  --   --  8.5*  --  8.0*   ALBUMIN g/dL 2.6*  --  2.6*  --   --  2.8*  --  2.4*   MAGNESIUM mg/dL 1.9  --  1.8  --   --  1.8  --  2.2   PHOSPHORUS mg/dL 2.3* 2.4* 2.0* 1.4*   < > 1.9*   < > 1.5*    < > = values in this interval not displayed.     Results from last 7 days   Lab Units 03/11/25  1214 03/11/25  0735 03/10/25  2306 03/10/25  2205 03/10/25  1743   PROCALCITONIN ng/mL  --   --  0.18  --   --    LACTATE mmol/L 1.7 1.2  --  1.6 3.4*     No results found for: \"HGBA1C\", \"POCGLU\"      No radiology results for the last day      Scheduled Meds  arformoterol, 15 mcg, Nebulization, BID - RT  budesonide, 1 mg, Nebulization, BID  famotidine, 20 mg, Oral, BID AC  heparin (porcine), 5,000 Units, Subcutaneous, Q8H  levothyroxine, 125 mcg, Oral, Daily  rosuvastatin, 40 mg, Oral, Nightly  sodium chloride, 10 mL, Intravenous, Q12H  sodium chloride, 10 mL, Intravenous, Q12H  sodium chloride, 10 mL, Intravenous, Q12H  sodium chloride, 10 mL, Intravenous, Q12H    Continuous Infusions     PRN Meds    acetaminophen    albuterol    senna-docusate sodium **AND** polyethylene glycol **AND** bisacodyl **AND** bisacodyl    Calcium Replacement - Follow Nurse / BPA Driven Protocol    LORazepam    Magnesium Standard Dose Replacement - Follow Nurse / BPA Driven Protocol    nitroglycerin    ondansetron ODT **OR** ondansetron    ondansetron    Phosphorus Replacement - Follow Nurse / BPA Driven Protocol    Potassium Replacement - Follow Nurse / BPA Driven Protocol    sodium chloride    sodium " chloride    sodium chloride    sodium chloride    sodium chloride    sodium chloride    sodium chloride    traZODone       Diet  Diet: Cardiac; Healthy Heart (2-3 Na+); Fluid Consistency: Thin (IDDSI 0)       Assessment/Plan     Active Hospital Problems    Diagnosis  POA    **Hematuria [R31.9]  Yes    Hemorrhagic shock [R57.8]  Unknown    Renal mass [N28.89]  Unknown    Presence of Watchman left atrial appendage closure device [Z95.818]  Unknown    CHF (congestive heart failure) [I50.9]  Yes    Hypertension [I10]  Yes    Coronary artery disease [I25.10]  Yes    COPD (chronic obstructive pulmonary disease) [J44.9]  Yes      Resolved Hospital Problems   No resolved problems to display.     Patient is a 84 y.o. male     Right renal mass  Gross hematuria, recurrent hematuria  Acute blood loss anemia and shock  Levophed off since afternoon 3/14/2025  Has received 5 total units PRBC (last 3/13/2025), 1 unit platelets. Hemoglobin stable. Changed to daily lab  Cystoscopy bedside and then in OR 3/12/2025, clot evacuation. Status post CBI. Now has indwelling catheter  Antibiotics for possible sepsis  DC central line  MRI to evaluate for possible IVC thrombus    A-fib status post Watchman  History of ICD  Anticoagulation switched to DAPT 6 months-currently on hold due to hematuria. Cardiology following. Recs monitor after restarting Plavix for a few days to make sure he does not rebleed    COPD  AISSATOU  Chronic hypoxic respiratory failure  Flow (L/min) (Oxygen Therapy):  [1-3] 3 (baseline 3 L/min)    Hypertension  Hyperlipidemia    DVT prophylaxis   SCDs    Discharge  TBD. Therapies following  Expected Discharge Date: 3/17/2025; Expected Discharge Time:     Discussed with patient, family, and nursing staff    Boris Espinoza MD  [facility]  03/16/25 14:03 EDT

## 2025-03-16 NOTE — PROGRESS NOTES
[facility]  [phone]  Dr. David Coronado    Subjective:  LOS: 5    Chief Complaint: Hematuria    Wife reports his diarrhea is better.  No further hematuria reported.    Objective   Vital Signs past 24hrs  Temp range: Temp (24hrs), Av °F (36.7 °C), Min:97.7 °F (36.5 °C), Max:98.4 °F (36.9 °C)    BP range: BP: ()/(48-96) 119/83  Pulse range: Heart Rate:  [] 103  Resp rate range: Resp:  [16-24] 16  Device (Oxygen Therapy): nasal cannulaFlow (L/min) (Oxygen Therapy):  [1-3] 3  Oxygen range:SpO2:  [98 %-100 %] 100 %   Mechanical Ventilator:     Physical Exam  Eyes:      Pupils: Pupils are equal, round, and reactive to light.   Cardiovascular:      Rate and Rhythm: Normal rate and regular rhythm.      Heart sounds: No murmur heard.  Pulmonary:      Effort: Pulmonary effort is normal.      Breath sounds: Decreased breath sounds present.   Abdominal:      General: Bowel sounds are normal.      Palpations: Abdomen is soft. There is no mass.      Tenderness: There is no abdominal tenderness.   Musculoskeletal:         General: Swelling (mild) present.   Neurological:      Mental Status: He is alert.       Results Review:    I have reviewed the laboratory and imaging data since the last note by [facility] physician.  My annotations are noted in assessment and plan.      Result Review:  I have personally reviewed the results from last note by [facility] physician to 3/16/2025 10:51 EDT and agree with these findings:  [x]  Laboratory list / accordion  [x]  Microbiology  [x]  Radiology  []  EKG/Telemetry   []  Cardiology/Vascular   []  Pathology  []  Old records  []  Other:      Medication Review:  I have reviewed the current MAR.  My annotations are noted in assessment and plan.    arformoterol, 15 mcg, Nebulization, BID - RT  budesonide, 1 mg, Nebulization, BID  famotidine, 20 mg, Oral, BID AC  heparin (porcine), 5,000 Units, Subcutaneous, Q8H  levothyroxine, 125 mcg, Oral, Daily  rosuvastatin, 40 mg, Oral,  Nightly  sodium chloride, 10 mL, Intravenous, Q12H  sodium chloride, 10 mL, Intravenous, Q12H  sodium chloride, 10 mL, Intravenous, Q12H  sodium chloride, 10 mL, Intravenous, Q12H             Lines, Drains & Airways       Active LDAs       Name Placement date Placement time Site Days    CVC Triple Lumen 03/11/25 Right Internal jugular 03/11/25  0945  Internal jugular  4    Peripheral IV 03/10/25 2307 Right Antecubital 03/10/25  2307  Antecubital  4    Continuous Bladder Irrigation Triple-lumen 24 Fr 03/12/25  0232  Triple-lumen  3                    PCCM Problems  Hypotension likely hypovolemic due to blood loss  Acute blood loss anemia  Hematuria  Right renal mass  Left nephrolithiasis  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  Hemoglobin mild drift.  Defer to urology for frequency of CBC.    A-fib and status post watchman.  DAPT on hold currently.  Note plans by cardiology to resume soon.    On subcutaneous heparin for DVT prophylaxis. Monitor Hb closely.    COPD without exacerbation.  On Brovana twice a day.    Continue home CPAP use.    Continue home oxygen flows.    Requires MRI for renal mass.  Has difficulty laying flat.  Can try with Xanax etc.    Was on empiric antibiotics for sepsis.  No evidence of infection on cultures.  Stopped abx yesterday.    He follows with Dr. Armani Sweeney of [facility] and should continue follow-up after discharge.    Will sign off. Please call as needed.    David Coronado MD  03/16/25  10:51 EDT    Isolation status: No active isolations    Dietary Orders (From admission, onward)       Start     Ordered    03/13/25 1200  Dietary Nutrition Supplements Boost Glucose Control (Glucerna  Shake); chocolate  Daily With Breakfast & Lunch      Question Answer Comment   Select Supplement: Boost Glucose Control (Glucerna Shake)    Flavor: chocolate        03/13/25 1146    03/12/25 0911  Diet: Cardiac; Healthy Heart (2-3 Na+); Fluid Consistency: Thin (IDDSI 0)  Diet Effective Now        References:    Diet Order Definitions   Question Answer Comment   Diets: Cardiac    Cardiac Diet: Healthy Heart (2-3 Na+)    Fluid Consistency: Thin (IDDSI 0)        03/12/25 0910                    Part of this note may be an electronic transcription/translation of spoken language to printed text using the Dragon Dictation System.

## 2025-03-16 NOTE — PROGRESS NOTES
LOS: 5 days   Patient Care Team:  German Mccray MD as PCP - General (Internal Medicine)  Alicia Pickens MD as Consulting Physician (Interventional Cardiology)  Armani Siddiqui MD as Consulting Physician (Pulmonary Disease)    Chief Complaint: follow up Watchman, antiplatelet therapy     Interval History: He was up in the chair on my exam. Heart rate elevated into the low 100's.     Vital Signs:  Temp:  [97.6 °F (36.4 °C)-98.4 °F (36.9 °C)] 97.6 °F (36.4 °C)  Heart Rate:  [] 95  Resp:  [16-24] 16  BP: ()/(59-96) 109/69    Intake/Output Summary (Last 24 hours) at 3/16/2025 1611  Last data filed at 3/16/2025 0500  Gross per 24 hour   Intake --   Output 2250 ml   Net -2250 ml        Physical Exam  Vitals reviewed.   Constitutional:       General: He is not in acute distress.  HENT:      Head: Normocephalic.   Eyes:      Extraocular Movements: Extraocular movements intact.      Pupils: Pupils are equal, round, and reactive to light.   Cardiovascular:      Rate and Rhythm: Rhythm irregularly irregular.      Pulses: Normal pulses.      Heart sounds: Normal heart sounds, S1 normal and S2 normal. Heart sounds not distant. No murmur heard.     No friction rub. No gallop. No S3 or S4 sounds.   Pulmonary:      Effort: Pulmonary effort is normal.      Breath sounds: Normal breath sounds.   Abdominal:      General: Abdomen is flat. Bowel sounds are normal.      Palpations: Abdomen is soft.      Tenderness: There is no abdominal tenderness.   Skin:     General: Skin is warm and dry.   Neurological:      General: No focal deficit present.      Mental Status: He is alert and oriented to person, place, and time.   Psychiatric:         Mood and Affect: Mood normal.         Behavior: Behavior normal.         Results Review:    Results from last 7 days   Lab Units 03/16/25  0513   SODIUM mmol/L 140   POTASSIUM mmol/L 3.8   CHLORIDE mmol/L 106   CO2 mmol/L 26.0   BUN mg/dL 8   CREATININE mg/dL 0.82    GLUCOSE mg/dL 102*   CALCIUM mg/dL 8.7         Results from last 7 days   Lab Units 03/16/25  0513   WBC 10*3/mm3 5.72   HEMOGLOBIN g/dL 8.8*   HEMATOCRIT % 27.6*   PLATELETS 10*3/mm3 162             Results from last 7 days   Lab Units 03/16/25  0513   MAGNESIUM mg/dL 1.9           I reviewed the patient's new clinical results.        Assessment & Plan:  Right renal mass  Hematuria  Severe anemia  Hemoglobin stable, plan to resume Plavix tomorrow if it remains stable.   CKD  Atrial fibrillation   Heart rate mildly elevated. BP borderline, consider resuming beta blocker if this improves.   CAD  AAA  Dyspnea  Noted to have edema and decreased breath sounds. Chest xray now stable and patient is breathing better.     Maria T Jimenez, CECI  03/16/25  16:11 EDT

## 2025-03-16 NOTE — PLAN OF CARE
Problem: Adult Inpatient Plan of Care  Goal: Plan of Care Review  Outcome: Progressing  Goal: Absence of Hospital-Acquired Illness or Injury  Intervention: Identify and Manage Fall Risk  Recent Flowsheet Documentation  Taken 3/15/2025 1800 by Jeanette Earl RN  Safety Promotion/Fall Prevention: safety round/check completed  Taken 3/15/2025 1600 by Jeanette Earl RN  Safety Promotion/Fall Prevention: safety round/check completed  Taken 3/15/2025 1400 by Jeanette Earl RN  Safety Promotion/Fall Prevention: safety round/check completed  Taken 3/15/2025 1200 by Jeanette Earl RN  Safety Promotion/Fall Prevention: safety round/check completed  Taken 3/15/2025 1000 by Jeanette Earl RN  Safety Promotion/Fall Prevention: safety round/check completed  Taken 3/15/2025 0800 by Jeanette Earl RN  Safety Promotion/Fall Prevention: safety round/check completed  Intervention: Prevent Skin Injury  Recent Flowsheet Documentation  Taken 3/15/2025 1800 by Jeanette Earl RN  Body Position: position changed independently  Taken 3/15/2025 1600 by Jeanette Earl RN  Body Position: position changed independently  Taken 3/15/2025 1400 by Jeanette Earl RN  Body Position: position changed independently  Taken 3/15/2025 1200 by Jeanette Earl RN  Body Position: position changed independently  Taken 3/15/2025 0800 by Jeanette Earl RN  Body Position: position changed independently  Intervention: Prevent and Manage VTE (Venous Thromboembolism) Risk  Recent Flowsheet Documentation  Taken 3/15/2025 0800 by Jeanette Earl RN  VTE Prevention/Management:   bilateral   SCDs (sequential compression devices) on  Intervention: Prevent Infection  Recent Flowsheet Documentation  Taken 3/15/2025 1600 by Jeanette Earl RN  Infection Prevention:   environmental surveillance performed   equipment surfaces disinfected   hand hygiene promoted   personal protective equipment utilized   rest/sleep promoted   single patient room  provided   visitors restricted/screened  Taken 3/15/2025 1200 by Jeanette Earl, RN  Infection Prevention:   environmental surveillance performed   equipment surfaces disinfected   hand hygiene promoted   personal protective equipment utilized   rest/sleep promoted   single patient room provided   visitors restricted/screened  Taken 3/15/2025 0800 by Jeanette Earl, RN  Infection Prevention:   environmental surveillance performed   equipment surfaces disinfected   hand hygiene promoted   personal protective equipment utilized   rest/sleep promoted   single patient room provided   visitors restricted/screened  Goal: Optimal Comfort and Wellbeing  Intervention: Provide Person-Centered Care  Recent Flowsheet Documentation  Taken 3/15/2025 0800 by Jeanette Earl, RN  Trust Relationship/Rapport:   care explained   choices provided   emotional support provided   empathic listening provided   questions answered   questions encouraged   reassurance provided   thoughts/feelings acknowledged   Goal Outcome Evaluation:

## 2025-03-16 NOTE — THERAPY TREATMENT NOTE
Acute Care - Physical Therapy Treatment Note  [facility]     Patient Name: Vijay Foote  : 1940  MRN: [id number]  Today's Date: 3/16/2025      Visit Dx:     ICD-10-CM ICD-9-CM   1. Urinary retention  R33.9 788.20   2. Hematuria, unspecified type  R31.9 599.70   3. Tachycardia  R00.0 785.0   4. Elevated lactic acid level  R79.89 276.2   5. Malignant neoplasm of right kidney  C64.1 189.0   6. Abnormal CT of the abdomen  R93.5 793.6   7. Abdominal aortic aneurysm (AAA) without rupture, unspecified part  I71.40 441.4     Patient Active Problem List   Diagnosis    S/P reverse total shoulder arthroplasty, right    Postoperative hypotension    Hyperlipidemia    COPD (chronic obstructive pulmonary disease)    Hematuria    Hypertension    Coronary artery disease    CHF (congestive heart failure)    Renal mass    Presence of Watchman left atrial appendage closure device    Hemorrhagic shock     Past Medical History:   Diagnosis Date    Asthma     Cardiomyopathy     COPD (chronic obstructive pulmonary disease)     Coronary artery disease     Disease of thyroid gland     Hyperlipidemia     Hypertension      Past Surgical History:   Procedure Laterality Date    CARDIAC CATHETERIZATION      CYSTOSCOPY N/A 3/12/2025    Procedure: CYSTOSCOPY, clot evacuation, catheter insertion;  Surgeon: Chance Reyes MD;  Location: [facility];  Service: Urology;  Laterality: N/A;    MASTOIDECTOMY Right     ORIF ANKLE FRACTURE Right     ROTATOR CUFF REPAIR Left     TONSILLECTOMY      TOTAL SHOULDER ARTHROPLASTY W/ DISTAL CLAVICLE EXCISION Right 2022    Procedure: RIGHT REVERSE TOTAL SHOULDER ARTHROPLASTY;  Surgeon: Wali Orellana MD;  Location: [facility];  Service: Orthopedics;  Laterality: Right;    TRIGGER FINGER RELEASE Left      PT Assessment (Last 12 Hours)       PT Evaluation and Treatment       Row Name 25 1547          Physical Therapy Time and Intention    Subjective Information no complaints  -JR      Document Type therapy note (daily note)  -JR     Mode of Treatment physical therapy  -JR     Patient Effort good  -JR       Row Name 03/16/25 1547          General Information    Patient Profile Reviewed yes  -JR       Row Name 03/16/25 1547          Pain    Pretreatment Pain Rating 3/10  -JR     Posttreatment Pain Rating 3/10  -JR     Pain Location --  heel  -JR     Pain Side/Orientation right  -JR     Response to Pain Interventions activity level improved  -       Row Name 03/16/25 1547          Cognition    Orientation Status (Cognition) oriented x 4  -       Row Name 03/16/25 1547          Bed Mobility    Comment, (Bed Mobility) sitting in chair.  -       Row Name 03/16/25 1547          Transfers    Transfers sit-stand transfer;stand-sit transfer  -       Row Name 03/16/25 1547          Sit-Stand Transfer    Sit-Stand [place] (Transfers) contact guard  -     Assistive Device (Sit-Stand Transfers) walker, front-wheeled  -       Row Name 03/16/25 1547          Stand-Sit Transfer    Stand-Sit Austin (Transfers) contact guard  -     Assistive Device (Stand-Sit Transfers) walker, front-wheeled  -       Row Name 03/16/25 1547          Gait/Stairs (Locomotion)    Austin Level (Gait) contact guard  -     Assistive Device (Gait) walker, front-wheeled  -JR     Patient was able to Ambulate yes  -JR     Distance in Feet (Gait) 150  -JR     Bilateral Gait Deviations forward flexed posture  -       Row Name 03/16/25 1547          Safety Issues/Impairments Affecting Functional Mobility    Impairments Affecting Function (Mobility) balance;endurance/activity tolerance  -       Row Name 03/16/25 1547          Balance    Dynamic Standing Balance contact guard  -     Balance Interventions sit to stand;standing;supported;static;dynamic  -     Comment, Balance Good balance with rolling walker.  -       Row Name 03/16/25 1547          Plan of Care Review    Plan of Care Reviewed With  patient  -JR     Progress improving  -JR       Row Name 03/16/25 1547          Vital Signs    Pretreatment Heart Rate (beats/min) 100  -JR     Intratreatment Heart Rate (beats/min) 115  -JR     Posttreatment Heart Rate (beats/min) 99  -JR     Pre SpO2 (%) 100  -JR     O2 Delivery Pre Treatment supplemental O2  -JR     O2 Delivery Intra Treatment supplemental O2  -JR     Post SpO2 (%) 99  -JR     O2 Delivery Post Treatment supplemental O2  -JR       Row Name 03/16/25 1547          Positioning and Restraints    Pre-Treatment Position sitting in chair/recliner  -JR     Post Treatment Position chair  -JR     In Chair notified nsg;reclined;call light within reach;encouraged to call for assist;exit alarm on;with family/caregiver;legs elevated  -JR               User Key  (r) = Recorded By, (t) = Taken By, (c) = Cosigned By      Initials Name Provider Type    Shayne Obrien PT Physical Therapist                    Physical Therapy Education       Title: PT OT SLP Therapies (In Progress)       Topic: Physical Therapy (Done)       Point: Mobility training (Done)       Learning Progress Summary            Patient Acceptance, E,D, VU,DU by  at 3/16/2025 1550    Acceptance, E, NR by AR at 3/14/2025 1044   Family Acceptance, E,D, VU,DU by  at 3/16/2025 1550                      Point: Home exercise program (Done)       Learning Progress Summary            Patient Acceptance, E,D, VU,DU by  at 3/16/2025 1550    Acceptance, E, NR by AR at 3/14/2025 1044   Family Acceptance, E,D, VU,DU by  at 3/16/2025 1550                      Point: Body mechanics (Done)       Learning Progress Summary            Patient Acceptance, E,D, VU,DU by  at 3/16/2025 1550    Acceptance, E, NR by AR at 3/14/2025 1044   Family Acceptance, E,D, VU,DU by  at 3/16/2025 1550                      Point: Precautions (Done)       Learning Progress Summary            Patient Acceptance, E,D, VU,DU by  at 3/16/2025 1550    Acceptance, E, NR by  AR at 3/14/2025 1044   Family Acceptance, E,D, VU,DU by JR at 3/16/2025 1550                                      User Key       Initials Effective Dates Name Provider Type Discipline    AR 06/16/21 -  Maria G Flores PT Physical Therapist PT    JR 06/16/21 -  Shayne Garzon PT Physical Therapist PT                  PT Recommendation and Plan     Plan of Care Reviewed With: patient  Progress: improving  Outcome Evaluation: Patient ambulated with a rolling walker around the unit with CGA and with supplemental O2.  Some fatigued noted afterward but nice improvement in ambulation distance.   Outcome Measures       Row Name 03/16/25 1551             How much help from another person do you currently need...    Turning from your back to your side while in flat bed without using bedrails? 4  -JR      Moving from lying on back to sitting on the side of a flat bed without bedrails? 3  -JR      Moving to and from a bed to a chair (including a wheelchair)? 3  -JR      Standing up from a chair using your arms (e.g., wheelchair, bedside chair)? 3  -JR      Climbing 3-5 steps with a railing? 3  -JR      To walk in hospital room? 3  -JR      AM-PAC 6 Clicks Score (PT) 19  -JR                User Key  (r) = Recorded By, (t) = Taken By, (c) = Cosigned By      Initials Name Provider Type    JR Shayne Garzon, PT Physical Therapist                     Time Calculation:    PT Charges       Row Name 03/16/25 1552             Time Calculation    Start Time 1529  -JR      Stop Time 1550  -JR      Time Calculation (min) 21 min  -JR      PT Received On 03/16/25  -JR      PT - Next Appointment 03/17/25  -JR                User Key  (r) = Recorded By, (t) = Taken By, (c) = Cosigned By      Initials Name Provider Type    Shayne Obrien, PT Physical Therapist                  Therapy Charges for Today       Code Description Service Date Service Provider Modifiers Qty    55189466939 HC GAIT TRAINING EA 15 MIN 3/16/2025 Ryann  Shayne AGUAYO, PT GP 1    68358320556  PT THERAPEUTIC ACT EA 15 MIN 3/16/2025 Shayne Garzon, PT GP 1            PT G-Codes  Outcome Measure Options: AM-PAC 6 Clicks Basic Mobility (PT)  AM-PAC 6 Clicks Score (PT): 19    Shayne Garzon, PT  3/16/2025

## 2025-03-17 ENCOUNTER — APPOINTMENT (OUTPATIENT)
Dept: CARDIOLOGY | Facility: HOSPITAL | Age: 85
End: 2025-03-17
Payer: MEDICARE

## 2025-03-17 ENCOUNTER — APPOINTMENT (OUTPATIENT)
Dept: MRI IMAGING | Facility: HOSPITAL | Age: 85
End: 2025-03-17
Payer: MEDICARE

## 2025-03-17 LAB
ALBUMIN SERPL-MCNC: 2.7 G/DL (ref 3.5–5.2)
ALBUMIN/GLOB SERPL: 1 G/DL
ALP SERPL-CCNC: 43 U/L (ref 39–117)
ALT SERPL W P-5'-P-CCNC: 11 U/L (ref 1–41)
ANION GAP SERPL CALCULATED.3IONS-SCNC: 6.1 MMOL/L (ref 5–15)
AST SERPL-CCNC: 18 U/L (ref 1–40)
BASOPHILS # BLD AUTO: 0.01 10*3/MM3 (ref 0–0.2)
BASOPHILS NFR BLD AUTO: 0.2 % (ref 0–1.5)
BH CV LOWER VASCULAR LEFT COMMON FEMORAL AUGMENT: NORMAL
BH CV LOWER VASCULAR LEFT COMMON FEMORAL COMPETENT: NORMAL
BH CV LOWER VASCULAR LEFT COMMON FEMORAL COMPRESS: NORMAL
BH CV LOWER VASCULAR LEFT COMMON FEMORAL PHASIC: NORMAL
BH CV LOWER VASCULAR LEFT COMMON FEMORAL SPONT: NORMAL
BH CV LOWER VASCULAR LEFT DISTAL FEMORAL COMPRESS: NORMAL
BH CV LOWER VASCULAR LEFT GASTRONEMIUS COMPRESS: NORMAL
BH CV LOWER VASCULAR LEFT GREATER SAPH AK COMPRESS: NORMAL
BH CV LOWER VASCULAR LEFT GREATER SAPH BK COMPRESS: NORMAL
BH CV LOWER VASCULAR LEFT LESSER SAPH COMPRESS: NORMAL
BH CV LOWER VASCULAR LEFT MID FEMORAL AUGMENT: NORMAL
BH CV LOWER VASCULAR LEFT MID FEMORAL COMPETENT: NORMAL
BH CV LOWER VASCULAR LEFT MID FEMORAL COMPRESS: NORMAL
BH CV LOWER VASCULAR LEFT MID FEMORAL PHASIC: NORMAL
BH CV LOWER VASCULAR LEFT MID FEMORAL SPONT: NORMAL
BH CV LOWER VASCULAR LEFT PERONEAL COMPRESS: NORMAL
BH CV LOWER VASCULAR LEFT POPLITEAL AUGMENT: NORMAL
BH CV LOWER VASCULAR LEFT POPLITEAL COMPETENT: NORMAL
BH CV LOWER VASCULAR LEFT POPLITEAL COMPRESS: NORMAL
BH CV LOWER VASCULAR LEFT POPLITEAL PHASIC: NORMAL
BH CV LOWER VASCULAR LEFT POPLITEAL SPONT: NORMAL
BH CV LOWER VASCULAR LEFT POSTERIOR TIBIAL COMPRESS: NORMAL
BH CV LOWER VASCULAR LEFT PROFUNDA FEMORAL COMPRESS: NORMAL
BH CV LOWER VASCULAR LEFT PROXIMAL FEMORAL COMPRESS: NORMAL
BH CV LOWER VASCULAR LEFT SAPHENOFEMORAL JUNCTION COMPRESS: NORMAL
BH CV LOWER VASCULAR RIGHT COMMON FEMORAL AUGMENT: NORMAL
BH CV LOWER VASCULAR RIGHT COMMON FEMORAL COMPETENT: NORMAL
BH CV LOWER VASCULAR RIGHT COMMON FEMORAL COMPRESS: NORMAL
BH CV LOWER VASCULAR RIGHT COMMON FEMORAL PHASIC: NORMAL
BH CV LOWER VASCULAR RIGHT COMMON FEMORAL SPONT: NORMAL
BH CV LOWER VASCULAR RIGHT DISTAL FEMORAL COMPRESS: NORMAL
BH CV LOWER VASCULAR RIGHT GASTRONEMIUS COMPRESS: NORMAL
BH CV LOWER VASCULAR RIGHT GREATER SAPH AK COMPRESS: NORMAL
BH CV LOWER VASCULAR RIGHT GREATER SAPH BK COMPRESS: NORMAL
BH CV LOWER VASCULAR RIGHT LESSER SAPH COMPRESS: NORMAL
BH CV LOWER VASCULAR RIGHT MID FEMORAL AUGMENT: NORMAL
BH CV LOWER VASCULAR RIGHT MID FEMORAL COMPETENT: NORMAL
BH CV LOWER VASCULAR RIGHT MID FEMORAL COMPRESS: NORMAL
BH CV LOWER VASCULAR RIGHT MID FEMORAL PHASIC: NORMAL
BH CV LOWER VASCULAR RIGHT MID FEMORAL SPONT: NORMAL
BH CV LOWER VASCULAR RIGHT PERONEAL COMPRESS: NORMAL
BH CV LOWER VASCULAR RIGHT POPLITEAL AUGMENT: NORMAL
BH CV LOWER VASCULAR RIGHT POPLITEAL COMPETENT: NORMAL
BH CV LOWER VASCULAR RIGHT POPLITEAL COMPRESS: NORMAL
BH CV LOWER VASCULAR RIGHT POPLITEAL PHASIC: NORMAL
BH CV LOWER VASCULAR RIGHT POPLITEAL SPONT: NORMAL
BH CV LOWER VASCULAR RIGHT POSTERIOR TIBIAL COMPRESS: NORMAL
BH CV LOWER VASCULAR RIGHT PROFUNDA FEMORAL COMPRESS: NORMAL
BH CV LOWER VASCULAR RIGHT PROXIMAL FEMORAL COMPRESS: NORMAL
BH CV LOWER VASCULAR RIGHT SAPHENOFEMORAL JUNCTION COMPRESS: NORMAL
BILIRUB SERPL-MCNC: 0.4 MG/DL (ref 0–1.2)
BUN SERPL-MCNC: 8 MG/DL (ref 8–23)
BUN/CREAT SERPL: 9.4 (ref 7–25)
CALCIUM SPEC-SCNC: 9.2 MG/DL (ref 8.6–10.5)
CHLORIDE SERPL-SCNC: 106 MMOL/L (ref 98–107)
CO2 SERPL-SCNC: 28.9 MMOL/L (ref 22–29)
CREAT SERPL-MCNC: 0.85 MG/DL (ref 0.76–1.27)
DEPRECATED RDW RBC AUTO: 48.7 FL (ref 37–54)
EGFRCR SERPLBLD CKD-EPI 2021: 86.8 ML/MIN/1.73
EOSINOPHIL # BLD AUTO: 0 10*3/MM3 (ref 0–0.4)
EOSINOPHIL NFR BLD AUTO: 0 % (ref 0.3–6.2)
ERYTHROCYTE [DISTWIDTH] IN BLOOD BY AUTOMATED COUNT: 14.9 % (ref 12.3–15.4)
GLOBULIN UR ELPH-MCNC: 2.7 GM/DL
GLUCOSE SERPL-MCNC: 101 MG/DL (ref 65–99)
HCT VFR BLD AUTO: 27.7 % (ref 37.5–51)
HGB BLD-MCNC: 8.8 G/DL (ref 13–17.7)
IMM GRANULOCYTES # BLD AUTO: 0.05 10*3/MM3 (ref 0–0.05)
IMM GRANULOCYTES NFR BLD AUTO: 0.9 % (ref 0–0.5)
LYMPHOCYTES # BLD AUTO: 0.71 10*3/MM3 (ref 0.7–3.1)
LYMPHOCYTES NFR BLD AUTO: 12.4 % (ref 19.6–45.3)
MAGNESIUM SERPL-MCNC: 2 MG/DL (ref 1.6–2.4)
MCH RBC QN AUTO: 28.9 PG (ref 26.6–33)
MCHC RBC AUTO-ENTMCNC: 31.8 G/DL (ref 31.5–35.7)
MCV RBC AUTO: 90.8 FL (ref 79–97)
MONOCYTES # BLD AUTO: 1.06 10*3/MM3 (ref 0.1–0.9)
MONOCYTES NFR BLD AUTO: 18.5 % (ref 5–12)
NEUTROPHILS NFR BLD AUTO: 3.91 10*3/MM3 (ref 1.7–7)
NEUTROPHILS NFR BLD AUTO: 68 % (ref 42.7–76)
NRBC BLD AUTO-RTO: 0 /100 WBC (ref 0–0.2)
PHOSPHATE SERPL-MCNC: 2.6 MG/DL (ref 2.5–4.5)
PLATELET # BLD AUTO: 187 10*3/MM3 (ref 140–450)
PMV BLD AUTO: 9.5 FL (ref 6–12)
POTASSIUM SERPL-SCNC: 4.3 MMOL/L (ref 3.5–5.2)
PROT SERPL-MCNC: 5.4 G/DL (ref 6–8.5)
RBC # BLD AUTO: 3.05 10*6/MM3 (ref 4.14–5.8)
SODIUM SERPL-SCNC: 141 MMOL/L (ref 136–145)
WBC NRBC COR # BLD AUTO: 5.74 10*3/MM3 (ref 3.4–10.8)

## 2025-03-17 PROCEDURE — 25010000002 FUROSEMIDE PER 20 MG: Performed by: INTERNAL MEDICINE

## 2025-03-17 PROCEDURE — 85025 COMPLETE CBC W/AUTO DIFF WBC: CPT | Performed by: UROLOGY

## 2025-03-17 PROCEDURE — 83735 ASSAY OF MAGNESIUM: CPT | Performed by: UROLOGY

## 2025-03-17 PROCEDURE — 25510000002 GADOBENATE DIMEGLUMINE 529 MG/ML SOLUTION: Performed by: HOSPITALIST

## 2025-03-17 PROCEDURE — 94761 N-INVAS EAR/PLS OXIMETRY MLT: CPT

## 2025-03-17 PROCEDURE — 25010000002 HEPARIN (PORCINE) PER 1000 UNITS: Performed by: STUDENT IN AN ORGANIZED HEALTH CARE EDUCATION/TRAINING PROGRAM

## 2025-03-17 PROCEDURE — 99232 SBSQ HOSP IP/OBS MODERATE 35: CPT | Performed by: NURSE PRACTITIONER

## 2025-03-17 PROCEDURE — A9577 INJ MULTIHANCE: HCPCS | Performed by: HOSPITALIST

## 2025-03-17 PROCEDURE — 94799 UNLISTED PULMONARY SVC/PX: CPT

## 2025-03-17 PROCEDURE — 94664 DEMO&/EVAL PT USE INHALER: CPT

## 2025-03-17 PROCEDURE — 84100 ASSAY OF PHOSPHORUS: CPT | Performed by: UROLOGY

## 2025-03-17 PROCEDURE — 80053 COMPREHEN METABOLIC PANEL: CPT | Performed by: UROLOGY

## 2025-03-17 PROCEDURE — 74183 MRI ABD W/O CNTR FLWD CNTR: CPT

## 2025-03-17 PROCEDURE — 93970 EXTREMITY STUDY: CPT

## 2025-03-17 PROCEDURE — 93970 EXTREMITY STUDY: CPT | Performed by: SURGERY

## 2025-03-17 RX ORDER — DIAZEPAM 5 MG/1
5 TABLET ORAL ONCE
Status: COMPLETED | OUTPATIENT
Start: 2025-03-17 | End: 2025-03-17

## 2025-03-17 RX ORDER — FUROSEMIDE 10 MG/ML
40 INJECTION INTRAMUSCULAR; INTRAVENOUS ONCE
Status: COMPLETED | OUTPATIENT
Start: 2025-03-17 | End: 2025-03-17

## 2025-03-17 RX ORDER — CLOPIDOGREL BISULFATE 75 MG/1
75 TABLET ORAL DAILY
Status: DISCONTINUED | OUTPATIENT
Start: 2025-03-17 | End: 2025-03-19 | Stop reason: HOSPADM

## 2025-03-17 RX ORDER — HYDROXYZINE HYDROCHLORIDE 25 MG/1
25 TABLET, FILM COATED ORAL 3 TIMES DAILY PRN
Status: DISCONTINUED | OUTPATIENT
Start: 2025-03-17 | End: 2025-03-19 | Stop reason: HOSPADM

## 2025-03-17 RX ADMIN — FAMOTIDINE 20 MG: 20 TABLET, FILM COATED ORAL at 17:09

## 2025-03-17 RX ADMIN — CLOPIDOGREL BISULFATE 75 MG: 75 TABLET ORAL at 14:19

## 2025-03-17 RX ADMIN — DIAZEPAM 5 MG: 5 TABLET ORAL at 08:31

## 2025-03-17 RX ADMIN — Medication 10 ML: at 21:31

## 2025-03-17 RX ADMIN — ROSUVASTATIN CALCIUM 40 MG: 20 TABLET, FILM COATED ORAL at 21:30

## 2025-03-17 RX ADMIN — BUDESONIDE 1 MG: 1 INHALANT ORAL at 06:49

## 2025-03-17 RX ADMIN — HEPARIN SODIUM 5000 UNITS: 5000 INJECTION INTRAVENOUS; SUBCUTANEOUS at 05:14

## 2025-03-17 RX ADMIN — Medication 10 ML: at 08:31

## 2025-03-17 RX ADMIN — ALBUTEROL SULFATE 2.5 MG: 2.5 SOLUTION RESPIRATORY (INHALATION) at 17:39

## 2025-03-17 RX ADMIN — ARFORMOTEROL TARTRATE 15 MCG: 15 SOLUTION RESPIRATORY (INHALATION) at 20:12

## 2025-03-17 RX ADMIN — LEVOTHYROXINE SODIUM 125 MCG: 125 TABLET ORAL at 08:31

## 2025-03-17 RX ADMIN — HYDROXYZINE HYDROCHLORIDE 25 MG: 25 TABLET, FILM COATED ORAL at 18:53

## 2025-03-17 RX ADMIN — IPRATROPIUM BROMIDE 0.5 MG: 0.5 SOLUTION RESPIRATORY (INHALATION) at 20:12

## 2025-03-17 RX ADMIN — HEPARIN SODIUM 5000 UNITS: 5000 INJECTION INTRAVENOUS; SUBCUTANEOUS at 14:19

## 2025-03-17 RX ADMIN — BUDESONIDE 1 MG: 1 INHALANT ORAL at 20:12

## 2025-03-17 RX ADMIN — FAMOTIDINE 20 MG: 20 TABLET, FILM COATED ORAL at 08:31

## 2025-03-17 RX ADMIN — HEPARIN SODIUM 5000 UNITS: 5000 INJECTION INTRAVENOUS; SUBCUTANEOUS at 21:30

## 2025-03-17 RX ADMIN — ARFORMOTEROL TARTRATE 15 MCG: 15 SOLUTION RESPIRATORY (INHALATION) at 06:48

## 2025-03-17 RX ADMIN — FUROSEMIDE 40 MG: 10 INJECTION, SOLUTION INTRAMUSCULAR; INTRAVENOUS at 21:30

## 2025-03-17 RX ADMIN — GADOBENATE DIMEGLUMINE 13 ML: 529 INJECTION, SOLUTION INTRAVENOUS at 16:15

## 2025-03-17 NOTE — PROGRESS NOTES
Nutrition Services    Patient Name:  Vijay Foote  YOB: 1940  MRN: [id number]  Admit Date:  3/10/2025    Nutrition Services    Patient Name: iVjay Foote  YOB: 1940  MRN: [id number]  Admission date: 3/10/2025    PROGRESS NOTE      Encounter Information: Checking in on PO intake. RD visited pt at bedside. Pt says he is eating okay. Pt's wife thinks he is eating a little less than he does at home. Pt drinking ONS, requesting discontinuation of healthy heart restriction.        PO Diet: Diet: Cardiac; Healthy Heart (2-3 Na+); Fluid Consistency: Thin (IDDSI 0)   PO Supplements: Boost Glucose Control BID (Provides 380 kcals, 32 g protein if consumed)      PO Intake:  50%       Current nutrition support: -   Nutrition support review: -       Labs (reviewed below): Reviewed, management per attending         GI Function:  Loose BM 3/16       Nutrition Intervention Updates: Continue ONS. Discontinue healthy heart restriction. Encourage good PO intake.        Results from last 7 days   Lab Units 03/17/25  0615 03/16/25  0513 03/15/25  0425   SODIUM mmol/L 141 140 139   POTASSIUM mmol/L 4.3 3.8 3.7   CHLORIDE mmol/L 106 106 105   CO2 mmol/L 28.9 26.0 27.0   BUN mg/dL 8 8 9   CREATININE mg/dL 0.85 0.82 0.83   CALCIUM mg/dL 9.2 8.7 8.3*   BILIRUBIN mg/dL 0.4 0.4 0.5   ALK PHOS U/L 43 42 41   ALT (SGPT) U/L 11 15 12   AST (SGOT) U/L 18 19 22   GLUCOSE mg/dL 101* 102* 101*     Results from last 7 days   Lab Units 03/17/25  0615 03/16/25  0513 03/15/25  1527 03/15/25  0425   MAGNESIUM mg/dL 2.0 1.9  --  1.8   PHOSPHORUS mg/dL 2.6 2.3*   < > 2.0*   HEMOGLOBIN g/dL 8.8* 8.8*   < > 8.3*   HEMATOCRIT % 27.7* 27.6*   < > 25.7*    < > = values in this interval not displayed.     COVID19   Date Value Ref Range Status   08/09/2022 Not Detected Not Detected - Ref. Range Final     Lab Results   Component Value Date    HGBA1C 6.2 (H) 09/19/2024       Wt Readings from Last 10 Encounters:   03/11/25 0921 68  kg (150 lb)   03/10/25 1735 68 kg (150 lb)   08/11/22 1124 77.1 kg (170 lb)   08/02/22 1604 77.5 kg (170 lb 12.8 oz)       RD to follow up per protocol.    Electronically signed by:  Yaquelin Rouse RD  03/17/25 08:04 EDT

## 2025-03-17 NOTE — DISCHARGE PLACEMENT REQUEST
"Vijay Foote (84 y.o. Male)       Date of Birth   1940    Social Security Number       Address   [address] [place] [address]    Home Phone   [phone]    MRN   [id number]       Restorationist   Catholic    Marital Status                               Admission Date   3/10/2025    Admission Type   Emergency    Admitting Provider   Kervin Khan MD    Attending Provider   Boris Espinoza MD    Department, Room/Bed   [facility], N329/1       Discharge Date       Discharge Disposition       Discharge Destination                                 Attending Provider: Boris Espinoza MD    Allergies: No Known Allergies    Isolation: None   Infection: None   Code Status: CPR    Ht: 170.2 cm (67\")   Wt: 68 kg (150 lb)    Admission Cmt: None   Principal Problem: Hematuria [R31.9]                   Active Insurance as of 3/10/2025       Primary Coverage       Payor Plan Insurance Group Employer/Plan Group    MEDICARE MEDICARE A & B        Payor Plan Address Payor Plan Phone Number Payor Plan Fax Number Effective Dates    [address] [phone]  7/1/2007 - None Entered    [facility] [id number]         Subscriber Name Subscriber Birth Date Member ID       VIJAY FOOTE 1940 [id number]               Secondary Coverage       Payor Plan Insurance Group Employer/Plan Group    AARP  SUP AARP HEALTH CARE OPTIONS        Payor Plan Address Payor Plan Phone Number Payor Plan Fax Number Effective Dates    [facility] [phone]  1/1/2019 - None Entered    [address]       [facility] [id number]         Subscriber Name Subscriber Birth Date Member ID       VIJAY FOOTE 1940 [id number]                     Emergency Contacts        (Rel.) Home Phone Work Phone Mobile Phone    RODOLFOJANETH (Spouse) [phone] -- [phone]                "

## 2025-03-17 NOTE — PROGRESS NOTES
Name: Vijay Foote ADMIT: 3/10/2025   : 1940  PCP: German Mccray MD    MRN: [id number] LOS: 6 days   AGE/SEX: 84 y.o. male  ROOM: [facility]     Subjective   Subjective   Bilateral ankle swelling     Objective   Objective   Vital Signs  Temp:  [97.6 °F (36.4 °C)-98.3 °F (36.8 °C)] 97.6 °F (36.4 °C)  Heart Rate:  [] 92  Resp:  [16-31] 20  BP: ()/(56-80) 88/72  SpO2:  [91 %-100 %] 100 %  on  Flow (L/min) (Oxygen Therapy):  [3] 3;   Device (Oxygen Therapy): humidified;nasal cannula  Body mass index is 23.49 kg/m².    Physical Exam  Constitutional:       General: He is not in acute distress.     Appearance: He is ill-appearing. He is not toxic-appearing.   HENT:      Head: Normocephalic and atraumatic.   Cardiovascular:      Rate and Rhythm: Normal rate and regular rhythm.   Pulmonary:      Effort: Pulmonary effort is normal. No respiratory distress.      Breath sounds: No wheezing or rhonchi.   Abdominal:      Tenderness: There is no abdominal tenderness.   Genitourinary:     Comments: Yellow urine in bag  Musculoskeletal:      Right ankle: Swelling present.      Left ankle: Swelling present.      Comments: 1+   Neurological:      Mental Status: He is alert and oriented to person, place, and time.      Motor: Weakness present.     Results Review  I reviewed the patient's new clinical results.  Results from last 7 days   Lab Units 25  0615 25  0513 03/15/25  2128 03/15/25  1527   WBC 10*3/mm3 5.74 5.72 6.69 6.53   HEMOGLOBIN g/dL 8.8* 8.8* 9.4* 8.6*   PLATELETS 10*3/mm3 187 162 152 150     Results from last 7 days   Lab Units 25  0615 25  0513 03/15/25  0425 25  0046   SODIUM mmol/L 141 140 139 143   POTASSIUM mmol/L 4.3 3.8 3.7 3.9   CHLORIDE mmol/L 106 106 105 110*   CO2 mmol/L 28.9 26.0 27.0 23.0   BUN mg/dL 8 8 9 11   CREATININE mg/dL 0.85 0.82 0.83 0.97   GLUCOSE mg/dL 101* 102* 101* 141*     Lab Results   Component Value Date    ANIONGAP 6.1 2025  "    Estimated Creatinine Clearance: 64.4 mL/min (by C-G formula based on SCr of 0.85 mg/dL).   Lab Results   Component Value Date    EGFR 86.8 03/17/2025     Results from last 7 days   Lab Units 03/17/25  0615 03/16/25  0513 03/15/25  0425 03/14/25  0046   ALBUMIN g/dL 2.7* 2.6* 2.6* 2.8*   BILIRUBIN mg/dL 0.4 0.4 0.5 0.5   ALK PHOS U/L 43 42 41 50   AST (SGOT) U/L 18 19 22 25   ALT (SGPT) U/L 11 15 12 12     Results from last 7 days   Lab Units 03/17/25  0615 03/16/25  0513 03/15/25  1527 03/15/25  0425 03/14/25  1024 03/14/25  0046   CALCIUM mg/dL 9.2 8.7  --  8.3*  --  8.5*   ALBUMIN g/dL 2.7* 2.6*  --  2.6*  --  2.8*   MAGNESIUM mg/dL 2.0 1.9  --  1.8  --  1.8   PHOSPHORUS mg/dL 2.6 2.3* 2.4* 2.0*   < > 1.9*    < > = values in this interval not displayed.     Results from last 7 days   Lab Units 03/11/25  1214 03/11/25  0735 03/10/25  2306 03/10/25  2205 03/10/25  1743   PROCALCITONIN ng/mL  --   --  0.18  --   --    LACTATE mmol/L 1.7 1.2  --  1.6 3.4*     No results found for: \"HGBA1C\", \"POCGLU\"      No radiology results for the last day      Scheduled Meds  arformoterol, 15 mcg, Nebulization, BID - RT  budesonide, 1 mg, Nebulization, BID  clopidogrel, 75 mg, Oral, Daily  famotidine, 20 mg, Oral, BID AC  heparin (porcine), 5,000 Units, Subcutaneous, Q8H  levothyroxine, 125 mcg, Oral, Daily  rosuvastatin, 40 mg, Oral, Nightly  sodium chloride, 10 mL, Intravenous, Q12H  sodium chloride, 10 mL, Intravenous, Q12H  sodium chloride, 10 mL, Intravenous, Q12H  sodium chloride, 10 mL, Intravenous, Q12H    Continuous Infusions     PRN Meds    acetaminophen    albuterol    senna-docusate sodium **AND** polyethylene glycol **AND** bisacodyl **AND** bisacodyl    Calcium Replacement - Follow Nurse / BPA Driven Protocol    LORazepam    Magnesium Standard Dose Replacement - Follow Nurse / BPA Driven Protocol    nitroglycerin    ondansetron ODT **OR** ondansetron    ondansetron    Phosphorus Replacement - Follow Nurse / BPA " Driven Protocol    Potassium Replacement - Follow Nurse / BPA Driven Protocol    sodium chloride    sodium chloride    sodium chloride    sodium chloride    sodium chloride    sodium chloride    sodium chloride    traZODone       Diet  Diet: Regular/House; Fluid Consistency: Thin (IDDSI 0)       Assessment/Plan     Active Hospital Problems    Diagnosis  POA    **Hematuria [R31.9]  Yes    Hemorrhagic shock [R57.8]  Unknown    Renal mass [N28.89]  Unknown    Presence of Watchman left atrial appendage closure device [Z95.818]  Unknown    CHF (congestive heart failure) [I50.9]  Yes    Hypertension [I10]  Yes    Coronary artery disease [I25.10]  Yes    COPD (chronic obstructive pulmonary disease) [J44.9]  Yes      Resolved Hospital Problems   No resolved problems to display.     Patient is a 84 y.o. male     Right renal mass  Gross hematuria, recurrent hematuria  Acute blood loss anemia and shock  Levophed off since afternoon 3/14/2025  Has received 5 total units PRBC (last 3/13/2025), 1 unit platelets. Hemoglobin stable. Monitoring daily  Cystoscopy bedside and then in OR 3/12/2025, clot evacuation. Status post CBI. Now has indwelling catheter  Antibiotics for possible sepsis (Zosyn completed 3/15/2025)  MRI to evaluate for possible IVC thrombus (premedicating with Valium). he has ankle swelling today will check duplex. Albumin is low also    A-fib status post Watchman  History of ICD  Anticoagulation switched to DAPT 6 months-currently on hold due to hematuria. Cardiology restarting Plavix today    COPD  AISSATOU  Chronic hypoxic respiratory failure  Flow (L/min) (Oxygen Therapy):  [3] 3 (baseline 3 L/min)    Hypertension  Hyperlipidemia    DVT prophylaxis   SCDs    Discharge  TBD.   Expected Discharge Date: 3/17/2025; Expected Discharge Time:     Discussed with patient, family, and nursing staff    Boris Espinoza MD  [facility]  03/17/25 13:32 EDT

## 2025-03-17 NOTE — PROGRESS NOTES
[facility]  Cardiology Progress Note    Patient Identification:  Name: Vijay Foote  Age: 84 y.o.  Sex: male  :  1940  MRN: [id number]                 Follow Up / Chief Complaint: Follow-up for hematuria/anemia.  Watchman's, antiplatelet therapy    Interval History: Resting in bed, wife at bedside. No chest pain, shortness of breath or palpitations.            Objective:    Past Medical History:  Past Medical History:   Diagnosis Date    Asthma     Cardiomyopathy     COPD (chronic obstructive pulmonary disease)     Coronary artery disease     Disease of thyroid gland     Hyperlipidemia     Hypertension      Past Surgical History:  Past Surgical History:   Procedure Laterality Date    CARDIAC CATHETERIZATION      CYSTOSCOPY N/A 3/12/2025    Procedure: CYSTOSCOPY, clot evacuation, catheter insertion;  Surgeon: Chance Reyes MD;  Location: [facility];  Service: Urology;  Laterality: N/A;    MASTOIDECTOMY Right     ORIF ANKLE FRACTURE Right     ROTATOR CUFF REPAIR Left     TONSILLECTOMY      TOTAL SHOULDER ARTHROPLASTY W/ DISTAL CLAVICLE EXCISION Right 2022    Procedure: RIGHT REVERSE TOTAL SHOULDER ARTHROPLASTY;  Surgeon: Wali Orellana MD;  Location: [facility] [place];  Service: Orthopedics;  Laterality: Right;    TRIGGER FINGER RELEASE Left         Social History:   Social History     Tobacco Use    Smoking status: Former     Current packs/day: 0.00     Types: Cigarettes     Start date:      Quit date:      Years since quitting: 15.2    Smokeless tobacco: Not on file   Substance Use Topics    Alcohol use: Yes     Comment: SOCIAL      Family History:  Family History   Problem Relation Age of Onset    Malig Hyperthermia Neg Hx           Allergies:  No Known Allergies  Scheduled Meds:  arformoterol, 15 mcg, BID - RT  budesonide, 1 mg, BID  famotidine, 20 mg, BID AC  heparin (porcine), 5,000 Units, Q8H  levothyroxine, 125 mcg, Daily  rosuvastatin, 40 mg, Nightly  sodium  chloride, 10 mL, Q12H  sodium chloride, 10 mL, Q12H  sodium chloride, 10 mL, Q12H  sodium chloride, 10 mL, Q12H            INTAKE AND OUTPUT:    Intake/Output Summary (Last 24 hours) at 3/17/2025 1242  Last data filed at 3/17/2025 0900  Gross per 24 hour   Intake 210 ml   Output 1125 ml   Net -915 ml       Review of Systems:   GI: No nausea or vomiting  Cardiac: No chest pain  Pulmonary: No shortness of breath    Constitutional:  Temp:  [97.6 °F (36.4 °C)-98.3 °F (36.8 °C)] 97.6 °F (36.4 °C)  Heart Rate:  [] 92  Resp:  [16-31] 20  BP: ()/(56-80) 88/72      Physical Exam:  General:  Alert, cooperative, appears in no acute distress  Respiratory: Clear to auscultation.  Normal respiratory effort and rate.  Cardiovascular: S1S2 irregularly irregular rhythm. No murmur, rub or gallop. Ble swelling  Gastrointestinal: soft, non tender. Bowel sounds present.   Extremities: ALBERTO x4. No pretibial pitting edema. Adequate musculoskeletal strength.   Neuro: AAO x3 CN II-XII grossly intact  .        Cardiographics           Lab Review       Results from last 7 days   Lab Units 03/17/25  0615   MAGNESIUM mg/dL 2.0     Results from last 7 days   Lab Units 03/17/25  0615   SODIUM mmol/L 141   POTASSIUM mmol/L 4.3   BUN mg/dL 8   CREATININE mg/dL 0.85   CALCIUM mg/dL 9.2     @[id number]@  Results from last 7 days   Lab Units 03/17/25  0615 03/16/25  0513 03/15/25  2128   WBC 10*3/mm3 5.74 5.72 6.69   HEMOGLOBIN g/dL 8.8* 8.8* 9.4*   HEMATOCRIT % 27.7* 27.6* 28.7*   PLATELETS 10*3/mm3 187 162 152             Assessment/plan:  1.  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    2.  Hematuria  3.  Severe anemia   4.  Hypertension  5.  Atrial fib with recent watchman done 2/24/2025  6.  Cardiomyopathy with biventricular pacing device: EF 30 to 35%. Lasix given on 3/14/25.  7.  CAD: Cardiac cath 8/7/2024 showed mild nonobstructive disease with patent  "proximal LAD stent.  8.  3.6 cm abnormal aortic aneurysm    Blood pressure low normal.  Hgb stable. Restart plavix and monitor.  Agree with duplex venous lower extremity doppler    )3/17/2025  CECI Padilla/Transcription:   \"Dictated utilizing Dragon dictation\".     "

## 2025-03-17 NOTE — PROGRESS NOTES
POD1 s/p RALP    Follow up for renal mass, gross hematuria.    No problems over the weekend. Remains on heparin. His shortness of breath has improved substantially.     NAD, A&Ox3  Abd soft, nondistended  Ayala intact, urine yellow and clear.    WBC 5.74, Hb 8.8  Cr 0.85  Bld Cx: no growth    Plan:  - cardiology plans to restart Plavix today  - continue indwelling catheter  - renal mass protocol MRI. Plan to premedicate with 5 mg valium  - will follow

## 2025-03-17 NOTE — CASE MANAGEMENT/SOCIAL WORK
Continued Stay Note  [facility]     Patient Name: Vijay Foote  MRN: [id number]  Today's Date: 3/17/2025    Admit Date: 3/10/2025    Plan: Referral to [facility] home health pending acceptance and will need HH orders. Rolling walker from [facility]   Discharge Plan       Row Name 03/17/25 1333       Plan    Plan Referral to [facility] home health pending acceptance and will need HH orders. Rolling walker from [facility]    Post Acute Provider List DME Supplier;Home Health    Provided Post Acute Provider Quality & Resource List? Yes    Delivered To Patient    Method of Delivery In person    Plan Comments Spoke with patient and spouse at [facility]. reviewed therapy recommendations for Home health and gave choice list and cms compare. Referrals made to patients choice of [facility] and [facility].                               Lashanda Dominguez RN

## 2025-03-18 LAB
ALBUMIN SERPL-MCNC: 2.5 G/DL (ref 3.5–5.2)
ALBUMIN/GLOB SERPL: 1 G/DL
ALP SERPL-CCNC: 44 U/L (ref 39–117)
ALT SERPL W P-5'-P-CCNC: 11 U/L (ref 1–41)
ANION GAP SERPL CALCULATED.3IONS-SCNC: 9.1 MMOL/L (ref 5–15)
AST SERPL-CCNC: 19 U/L (ref 1–40)
BASOPHILS # BLD AUTO: 0 10*3/MM3 (ref 0–0.2)
BASOPHILS NFR BLD AUTO: 0 % (ref 0–1.5)
BILIRUB SERPL-MCNC: 0.4 MG/DL (ref 0–1.2)
BUN SERPL-MCNC: 8 MG/DL (ref 8–23)
BUN/CREAT SERPL: 9.6 (ref 7–25)
CALCIUM SPEC-SCNC: 9.1 MG/DL (ref 8.6–10.5)
CHLORIDE SERPL-SCNC: 106 MMOL/L (ref 98–107)
CO2 SERPL-SCNC: 27.9 MMOL/L (ref 22–29)
CREAT SERPL-MCNC: 0.83 MG/DL (ref 0.76–1.27)
DEPRECATED RDW RBC AUTO: 50.4 FL (ref 37–54)
EGFRCR SERPLBLD CKD-EPI 2021: 87.4 ML/MIN/1.73
EOSINOPHIL # BLD AUTO: 0 10*3/MM3 (ref 0–0.4)
EOSINOPHIL NFR BLD AUTO: 0 % (ref 0.3–6.2)
ERYTHROCYTE [DISTWIDTH] IN BLOOD BY AUTOMATED COUNT: 15 % (ref 12.3–15.4)
GLOBULIN UR ELPH-MCNC: 2.6 GM/DL
GLUCOSE SERPL-MCNC: 96 MG/DL (ref 65–99)
HCT VFR BLD AUTO: 31 % (ref 37.5–51)
HGB BLD-MCNC: 9.6 G/DL (ref 13–17.7)
IMM GRANULOCYTES # BLD AUTO: 0.06 10*3/MM3 (ref 0–0.05)
IMM GRANULOCYTES NFR BLD AUTO: 0.9 % (ref 0–0.5)
LYMPHOCYTES # BLD AUTO: 0.8 10*3/MM3 (ref 0.7–3.1)
LYMPHOCYTES NFR BLD AUTO: 11.7 % (ref 19.6–45.3)
MAGNESIUM SERPL-MCNC: 2.2 MG/DL (ref 1.6–2.4)
MCH RBC QN AUTO: 28.6 PG (ref 26.6–33)
MCHC RBC AUTO-ENTMCNC: 31 G/DL (ref 31.5–35.7)
MCV RBC AUTO: 92.3 FL (ref 79–97)
MONOCYTES # BLD AUTO: 1.26 10*3/MM3 (ref 0.1–0.9)
MONOCYTES NFR BLD AUTO: 18.5 % (ref 5–12)
NEUTROPHILS NFR BLD AUTO: 4.7 10*3/MM3 (ref 1.7–7)
NEUTROPHILS NFR BLD AUTO: 68.9 % (ref 42.7–76)
NRBC BLD AUTO-RTO: 0 /100 WBC (ref 0–0.2)
PHOSPHATE SERPL-MCNC: 2.3 MG/DL (ref 2.5–4.5)
PLATELET # BLD AUTO: 194 10*3/MM3 (ref 140–450)
PMV BLD AUTO: 9.4 FL (ref 6–12)
POTASSIUM SERPL-SCNC: 4.1 MMOL/L (ref 3.5–5.2)
PROT SERPL-MCNC: 5.1 G/DL (ref 6–8.5)
RBC # BLD AUTO: 3.36 10*6/MM3 (ref 4.14–5.8)
SODIUM SERPL-SCNC: 143 MMOL/L (ref 136–145)
WBC NRBC COR # BLD AUTO: 6.82 10*3/MM3 (ref 3.4–10.8)

## 2025-03-18 PROCEDURE — 94664 DEMO&/EVAL PT USE INHALER: CPT

## 2025-03-18 PROCEDURE — 83735 ASSAY OF MAGNESIUM: CPT | Performed by: UROLOGY

## 2025-03-18 PROCEDURE — 99232 SBSQ HOSP IP/OBS MODERATE 35: CPT | Performed by: INTERNAL MEDICINE

## 2025-03-18 PROCEDURE — 94799 UNLISTED PULMONARY SVC/PX: CPT

## 2025-03-18 PROCEDURE — 97110 THERAPEUTIC EXERCISES: CPT

## 2025-03-18 PROCEDURE — 25010000002 HEPARIN (PORCINE) PER 1000 UNITS: Performed by: STUDENT IN AN ORGANIZED HEALTH CARE EDUCATION/TRAINING PROGRAM

## 2025-03-18 PROCEDURE — 85025 COMPLETE CBC W/AUTO DIFF WBC: CPT | Performed by: UROLOGY

## 2025-03-18 PROCEDURE — 25010000002 DIGOXIN PER 500 MCG: Performed by: INTERNAL MEDICINE

## 2025-03-18 PROCEDURE — 94761 N-INVAS EAR/PLS OXIMETRY MLT: CPT

## 2025-03-18 PROCEDURE — 84100 ASSAY OF PHOSPHORUS: CPT | Performed by: UROLOGY

## 2025-03-18 PROCEDURE — 80053 COMPREHEN METABOLIC PANEL: CPT | Performed by: UROLOGY

## 2025-03-18 RX ORDER — FUROSEMIDE 40 MG/1
40 TABLET ORAL DAILY
Status: DISCONTINUED | OUTPATIENT
Start: 2025-03-18 | End: 2025-03-19 | Stop reason: HOSPADM

## 2025-03-18 RX ORDER — METOPROLOL TARTRATE 25 MG/1
12.5 TABLET, FILM COATED ORAL EVERY 12 HOURS SCHEDULED
Status: DISCONTINUED | OUTPATIENT
Start: 2025-03-18 | End: 2025-03-19

## 2025-03-18 RX ORDER — DIGOXIN 0.25 MG/ML
250 INJECTION INTRAMUSCULAR; INTRAVENOUS ONCE
Status: COMPLETED | OUTPATIENT
Start: 2025-03-18 | End: 2025-03-18

## 2025-03-18 RX ADMIN — METOPROLOL TARTRATE 12.5 MG: 25 TABLET, FILM COATED ORAL at 21:50

## 2025-03-18 RX ADMIN — BUDESONIDE 1 MG: 1 INHALANT ORAL at 20:13

## 2025-03-18 RX ADMIN — Medication 10 ML: at 21:50

## 2025-03-18 RX ADMIN — Medication 10 ML: at 09:07

## 2025-03-18 RX ADMIN — Medication 10 ML: at 09:06

## 2025-03-18 RX ADMIN — METOPROLOL TARTRATE 12.5 MG: 25 TABLET, FILM COATED ORAL at 09:46

## 2025-03-18 RX ADMIN — IPRATROPIUM BROMIDE 0.5 MG: 0.5 SOLUTION RESPIRATORY (INHALATION) at 08:32

## 2025-03-18 RX ADMIN — IPRATROPIUM BROMIDE 0.5 MG: 0.5 SOLUTION RESPIRATORY (INHALATION) at 20:13

## 2025-03-18 RX ADMIN — FUROSEMIDE 40 MG: 40 TABLET ORAL at 09:46

## 2025-03-18 RX ADMIN — IPRATROPIUM BROMIDE 0.5 MG: 0.5 SOLUTION RESPIRATORY (INHALATION) at 11:48

## 2025-03-18 RX ADMIN — CLOPIDOGREL BISULFATE 75 MG: 75 TABLET ORAL at 09:06

## 2025-03-18 RX ADMIN — HEPARIN SODIUM 5000 UNITS: 5000 INJECTION INTRAVENOUS; SUBCUTANEOUS at 06:12

## 2025-03-18 RX ADMIN — ROSUVASTATIN CALCIUM 40 MG: 20 TABLET, FILM COATED ORAL at 21:50

## 2025-03-18 RX ADMIN — HEPARIN SODIUM 5000 UNITS: 5000 INJECTION INTRAVENOUS; SUBCUTANEOUS at 21:50

## 2025-03-18 RX ADMIN — FAMOTIDINE 20 MG: 20 TABLET, FILM COATED ORAL at 06:12

## 2025-03-18 RX ADMIN — BUDESONIDE 1 MG: 1 INHALANT ORAL at 08:32

## 2025-03-18 RX ADMIN — ARFORMOTEROL TARTRATE 15 MCG: 15 SOLUTION RESPIRATORY (INHALATION) at 08:32

## 2025-03-18 RX ADMIN — HEPARIN SODIUM 5000 UNITS: 5000 INJECTION INTRAVENOUS; SUBCUTANEOUS at 16:14

## 2025-03-18 RX ADMIN — Medication 1 PACKET: at 10:55

## 2025-03-18 RX ADMIN — DIGOXIN 250 MCG: 0.25 INJECTION INTRAMUSCULAR; INTRAVENOUS at 09:47

## 2025-03-18 RX ADMIN — IPRATROPIUM BROMIDE 0.5 MG: 0.5 SOLUTION RESPIRATORY (INHALATION) at 16:04

## 2025-03-18 RX ADMIN — ARFORMOTEROL TARTRATE 15 MCG: 15 SOLUTION RESPIRATORY (INHALATION) at 20:13

## 2025-03-18 RX ADMIN — LEVOTHYROXINE SODIUM 125 MCG: 125 TABLET ORAL at 09:06

## 2025-03-18 RX ADMIN — FAMOTIDINE 20 MG: 20 TABLET, FILM COATED ORAL at 16:15

## 2025-03-18 NOTE — THERAPY TREATMENT NOTE
Patient Name: Vijay Foote  : 1940    MRN: [id number]                              Today's Date: 3/18/2025       Admit Date: 3/10/2025    Visit Dx:     ICD-10-CM ICD-9-CM   1. Weakness generalized  R53.1 780.79   2. Hematuria, unspecified type  R31.9 599.70   3. Urinary retention  R33.9 788.20   4. Tachycardia  R00.0 785.0   5. Elevated lactic acid level  R79.89 276.2   6. Malignant neoplasm of right kidney  C64.1 189.0   7. Abnormal CT of the abdomen  R93.5 793.6   8. Abdominal aortic aneurysm (AAA) without rupture, unspecified part  I71.40 441.4   9. Chronic obstructive pulmonary disease, unspecified COPD type  J44.9 496     Patient Active Problem List   Diagnosis    S/P reverse total shoulder arthroplasty, right    Postoperative hypotension    Hyperlipidemia    COPD (chronic obstructive pulmonary disease)    Hematuria    Hypertension    Coronary artery disease    CHF (congestive heart failure)    Renal mass    Presence of Watchman left atrial appendage closure device    Hemorrhagic shock     Past Medical History:   Diagnosis Date    Asthma     Cardiomyopathy     COPD (chronic obstructive pulmonary disease)     Coronary artery disease     Disease of thyroid gland     Hyperlipidemia     Hypertension      Past Surgical History:   Procedure Laterality Date    CARDIAC CATHETERIZATION      CYSTOSCOPY N/A 3/12/2025    Procedure: CYSTOSCOPY, clot evacuation, catheter insertion;  Surgeon: Chance Reyes MD;  Location: [facility];  Service: Urology;  Laterality: N/A;    MASTOIDECTOMY Right     ORIF ANKLE FRACTURE Right     ROTATOR CUFF REPAIR Left     TONSILLECTOMY      TOTAL SHOULDER ARTHROPLASTY W/ DISTAL CLAVICLE EXCISION Right 2022    Procedure: RIGHT REVERSE TOTAL SHOULDER ARTHROPLASTY;  Surgeon: Wali Orellana MD;  Location: [facility];  Service: Orthopedics;  Laterality: Right;    TRIGGER FINGER RELEASE Left       General Information       Row Name 25 1424          Physical  Therapy Time and Intention    Document Type therapy note (daily note)  -AR     Mode of Treatment physical therapy  -AR       Row Name 03/18/25 1424          General Information    Patient Profile Reviewed yes  -AR     Existing Precautions/Restrictions fall  -AR       Row Name 03/18/25 1424          Cognition    Orientation Status (Cognition) oriented x 3  -AR               User Key  (r) = Recorded By, (t) = Taken By, (c) = Cosigned By      Initials Name Provider Type    AR Maria G Flores, PT Physical Therapist                   Mobility       Row Name 03/18/25 1424          Bed Mobility    Supine-Sit [place] (Bed Mobility) standby assist  -AR     Sit-Supine [place] (Bed Mobility) not tested  -AR     Assistive Device (Bed Mobility) head of bed elevated;bed rails  -AR       Row Name 03/18/25 1424          Sit-Stand Transfer    Sit-Stand [place] (Transfers) contact guard  -AR     Assistive Device (Sit-Stand Transfers) walker, front-wheeled  -AR       Row Name 03/18/25 1424          Gait/Stairs (Locomotion)    [place] Level (Gait) contact guard  -AR     Assistive Device (Gait) walker, front-wheeled  -AR     Distance in Feet (Gait) 150  -AR     Deviations/Abnormal Patterns (Gait) gait speed decreased  -AR     Bilateral Gait Deviations forward flexed posture  -AR     Comment, (Gait/Stairs) cues for safety w/ RW  -AR               User Key  (r) = Recorded By, (t) = Taken By, (c) = Cosigned By      Initials Name Provider Type    Maria G Siddiqui, CELI Physical Therapist                   Obj/Interventions       Row Name 03/18/25 1425          Balance    Balance Assessment standing dynamic balance  -AR     Dynamic Standing Balance contact guard  -AR     Position/Device Used, Standing Balance walker, rolling  -AR               User Key  (r) = Recorded By, (t) = Taken By, (c) = Cosigned By      Initials Name Provider Type    Maria G Siddiqui, PT Physical Therapist                   Goals/Plan    No  documentation.                  Clinical Impression       Row Name 03/18/25 1425          Pain    Pretreatment Pain Rating 3/10  -AR     Posttreatment Pain Rating 3/10  -AR     Pain Location --  catheter sit  -AR       Row Name 03/18/25 1425          Plan of Care Review    Plan of Care Reviewed With patient;spouse  -AR     Outcome Evaluation Slowly improving activity tolerance and independence w/ mobility duringPT.  Able to ambulate 150' w/ CGA using RW, on 3L NC.  PLanning for DC to home w/ home PT, RW delivered.  -AR       Row Name 03/18/25 1425          Therapy Assessment/Plan (PT)    Rehab Potential (PT) good  -AR     Criteria for Skilled Interventions Met (PT) yes  -AR     Therapy Frequency (PT) 5 times/wk  -AR       Row Name 03/18/25 1425          Vital Signs    O2 Delivery Pre Treatment supplemental O2  -AR       Row Name 03/18/25 1425          Positioning and Restraints    Pre-Treatment Position in bed  -AR     Post Treatment Position chair  -AR     In Chair notified nsg;reclined;sitting;call light within reach;encouraged to call for assist;with family/caregiver  no alarm, RN aware, also multiple alarm boxes on unit that  have no batteries/don't work, no black  cord  -AR               User Key  (r) = Recorded By, (t) = Taken By, (c) = Cosigned By      Initials Name Provider Type    Maria G Siddiqui, PT Physical Therapist                   Outcome Measures       Row Name 03/18/25 1427 03/18/25 0831       How much help from another person do you currently need...    Turning from your back to your side while in flat bed without using bedrails? 4  -AR 4  -AD    Moving from lying on back to sitting on the side of a flat bed without bedrails? 3  -AR 3  -AD    Moving to and from a bed to a chair (including a wheelchair)? 3  -AR 3  -AD    Standing up from a chair using your arms (e.g., wheelchair, bedside chair)? 3  -AR 3  -AD    Climbing 3-5 steps with a railing? 3  -AR 3  -AD    To walk in hospital room?  3  -AR 3  -AD    AM-PAC 6 Clicks Score (PT) 19  -AR 19  -AD    Highest Level of Mobility Goal 6 --> Walk 10 steps or more  -AR 6 --> Walk 10 steps or more  -AD      Row Name 03/18/25 1427          Functional Assessment    Outcome Measure Options AM-PAC 6 Clicks Basic Mobility (PT)  -AR               User Key  (r) = Recorded By, (t) = Taken By, (c) = Cosigned By      Initials Name Provider Type    AR Maria G Flores, CELI Physical Therapist    Julianna Morejon, RN Registered Nurse                                 Physical Therapy Education       Title: PT OT SLP Therapies (In Progress)       Topic: Physical Therapy (In Progress)       Point: Mobility training (In Progress)       Learning Progress Summary            Patient Acceptance, E, NR by AR at 3/18/2025 1427    Acceptance, E,D, VU,DU by JR at 3/16/2025 1550    Acceptance, E, NR by AR at 3/14/2025 1044   Family Acceptance, E,D, VU,DU by JR at 3/16/2025 1550                      Point: Home exercise program (In Progress)       Learning Progress Summary            Patient Acceptance, E, NR by AR at 3/18/2025 1427    Acceptance, E,D, VU,DU by JR at 3/16/2025 1550    Acceptance, E, NR by AR at 3/14/2025 1044   Family Acceptance, E,D, VU,DU by JR at 3/16/2025 1550                      Point: Body mechanics (In Progress)       Learning Progress Summary            Patient Acceptance, E, NR by AR at 3/18/2025 1427    Acceptance, E,D, VU,DU by JR at 3/16/2025 1550    Acceptance, E, NR by AR at 3/14/2025 1044   Family Acceptance, E,D, VU,DU by JR at 3/16/2025 1550                      Point: Precautions (In Progress)       Learning Progress Summary            Patient Acceptance, E, NR by AR at 3/18/2025 1427    Acceptance, E,D, VU,DU by JR at 3/16/2025 1550    Acceptance, E, NR by AR at 3/14/2025 1044   Family Acceptance, E,D, VU,DU by JR at 3/16/2025 1550                                      User Key       Initials Effective Dates Name Provider Type Discipline    AR  06/16/21 -  Maria G Flores, PT Physical Therapist PT    JR 06/16/21 -  Shayne Garzon PT Physical Therapist PT                  PT Recommendation and Plan  Planned Therapy Interventions (PT): balance training, bed mobility training, gait training, home exercise program, neuromuscular re-education, transfer training, strengthening, stair training, patient/family education  Outcome Evaluation: Slowly improving activity tolerance and independence w/ mobility duringPT.  Able to ambulate 150' w/ CGA using RW, on 3L NC.  PLanning for DC to home w/ home PT, RW delivered.     Time Calculation:         PT Charges       Row Name 03/18/25 1424             Time Calculation    Start Time 1335  -AR      Stop Time 1400  -AR      Time Calculation (min) 25 min  -AR      PT Received On 03/18/25  -AR      PT - Next Appointment 03/19/25  -AR                User Key  (r) = Recorded By, (t) = Taken By, (c) = Cosigned By      Initials Name Provider Type    AR Maria G Flores, PT Physical Therapist                  Therapy Charges for Today       Code Description Service Date Service Provider Modifiers Qty    26528677755 HC PT THER PROC EA 15 MIN 3/18/2025 Maria G Flores, PT GP 2            PT G-Codes  Outcome Measure Options: AM-PAC 6 Clicks Basic Mobility (PT)  AM-PAC 6 Clicks Score (PT): 19  PT Discharge Summary  Anticipated Discharge Disposition (PT): home with home health    Maria G Flores PT  3/18/2025

## 2025-03-18 NOTE — PROGRESS NOTES
"Follow up for renal mass, gross hematuria.    Transitioned to plavix yesterday. Rocha in place draining well. Patient resting comfortably this AM.    NAD  Rocha intact, urine yellow and clear.    Lab Results   Component Value Date    HGB 8.8 (L) 03/17/2025     Lab Results   Component Value Date    CREATININE 0.85 03/17/2025     Lab Results   Component Value Date    WBC 5.74 03/17/2025     MRI Abdomen With & Without Contrast  Result Date: 3/17/2025  1. Exam is significantly limited by artifact. Diffusion weighted imaging is nondiagnostic. 2. A 9.6 cm enhancing solid mass arising exophytically from the right kidney inferior pole likely representing renal cell carcinoma. Mass widely abuts the posterior right hepatic lobe as well as the posterior and lateral most abdominal fascial surfaces. 3. Blooming/susceptibility artifact in the IVC at site of calcification on recent CT. No enhancing soft tissue adjacent to the blooming/susceptibility artifact to suggest IVC tumor thrombus. Main right renal vein nearest the IVC is patent. 4. Hemorrhagic/proteinaceous products within a few right renal calyces. Unchanged mild right sided hydronephrosis. 5. No abdominal lymphadenopathy. 6. A 3.6 cm infrarenal abdominal aortic aneurysm. 7. New small bilateral pleural effusions.   This report was finalized on 3/17/2025 5:08 PM by Dr. Tk Cardona M.D on Workstation: [id number]          Plan:  - MRI abd 3/17/25 without clear evidence of RV or IVC tumor thombus  - continue indwelling catheter for 10 days from date of clot evacuation and complex rocha placement 3/12/25 prior to voiding trial --- per Dr. Reyes \"large posterior membranous/prostatic urethral false passage\"  - will follow    Armen Mendes MD   "

## 2025-03-18 NOTE — PROGRESS NOTES
Name: Vijay Foote ADMIT: 3/10/2025   : 1940  PCP: German Mccray MD    MRN: [id number] LOS: 7 days   AGE/SEX: 84 y.o. male  ROOM: [facility]     Subjective   Subjective        Objective   Objective   Vital Signs  Temp:  [98 °F (36.7 °C)-98.4 °F (36.9 °C)] 98.2 °F (36.8 °C)  Heart Rate:  [] 81  Resp:  [18-21] 18  BP: ()/(56-75) 120/70  SpO2:  [92 %-100 %] 98 %  on  Flow (L/min) (Oxygen Therapy):  [3] 3;   Device (Oxygen Therapy): nasal cannula  Body mass index is 26.07 kg/m².    Physical Exam  Constitutional:       General: He is not in acute distress.     Appearance: He is ill-appearing. He is not toxic-appearing.   HENT:      Head: Normocephalic and atraumatic.   Cardiovascular:      Rate and Rhythm: Normal rate and regular rhythm.   Pulmonary:      Effort: Pulmonary effort is normal. No respiratory distress.      Breath sounds: No wheezing or rhonchi.   Abdominal:      Tenderness: There is no abdominal tenderness.   Genitourinary:     Comments: Yellow urine in bag  Musculoskeletal:      Right ankle: Swelling present.      Left ankle: Swelling present.      Comments: trace   Neurological:      Mental Status: He is alert and oriented to person, place, and time.      Motor: Weakness present.     Results Review  I reviewed the patient's new clinical results.  Results from last 7 days   Lab Units 25  0740 25  0615 03/16/25  0513 03/15/25  2128   WBC 10*3/mm3 6.82 5.74 5.72 6.69   HEMOGLOBIN g/dL 9.6* 8.8* 8.8* 9.4*   PLATELETS 10*3/mm3 194 187 162 152     Results from last 7 days   Lab Units 25  0740 25  0615 25  0513 03/15/25  0425   SODIUM mmol/L 143 141 140 139   POTASSIUM mmol/L 4.1 4.3 3.8 3.7   CHLORIDE mmol/L 106 106 106 105   CO2 mmol/L 27.9 28.9 26.0 27.0   BUN mg/dL 8 8 8 9   CREATININE mg/dL 0.83 0.85 0.82 0.83   GLUCOSE mg/dL 96 101* 102* 101*     Lab Results   Component Value Date    ANIONGAP 9.1 2025     Estimated Creatinine Clearance: 73.3  "mL/min (by C-G formula based on SCr of 0.83 mg/dL).   Lab Results   Component Value Date    EGFR 87.4 03/18/2025     Results from last 7 days   Lab Units 03/18/25  0740 03/17/25  0615 03/16/25  0513 03/15/25  0425   ALBUMIN g/dL 2.5* 2.7* 2.6* 2.6*   BILIRUBIN mg/dL 0.4 0.4 0.4 0.5   ALK PHOS U/L 44 43 42 41   AST (SGOT) U/L 19 18 19 22   ALT (SGPT) U/L 11 11 15 12     Results from last 7 days   Lab Units 03/18/25  0740 03/17/25  0615 03/16/25  0513 03/15/25  1527 03/15/25  0425   CALCIUM mg/dL 9.1 9.2 8.7  --  8.3*   ALBUMIN g/dL 2.5* 2.7* 2.6*  --  2.6*   MAGNESIUM mg/dL 2.2 2.0 1.9  --  1.8   PHOSPHORUS mg/dL 2.3* 2.6 2.3* 2.4* 2.0*           No results found for: \"HGBA1C\", \"POCGLU\"      MRI Abdomen With & Without Contrast  Result Date: 3/17/2025  1. Exam is significantly limited by artifact. Diffusion weighted imaging is nondiagnostic. 2. A 9.6 cm enhancing solid mass arising exophytically from the right kidney inferior pole likely representing renal cell carcinoma. Mass widely abuts the posterior right hepatic lobe as well as the posterior and lateral most abdominal fascial surfaces. 3. Blooming/susceptibility artifact in the IVC at site of calcification on recent CT. No enhancing soft tissue adjacent to the blooming/susceptibility artifact to suggest IVC tumor thrombus. Main right renal vein nearest the IVC is patent. 4. Hemorrhagic/proteinaceous products within a few right renal calyces. Unchanged mild right sided hydronephrosis. 5. No abdominal lymphadenopathy. 6. A 3.6 cm infrarenal abdominal aortic aneurysm. 7. New small bilateral pleural effusions.   This report was finalized on 3/17/2025 5:08 PM by Dr. Tk Cardona M.D on Workstation: [id number]          Scheduled Meds  arformoterol, 15 mcg, Nebulization, BID - RT  budesonide, 1 mg, Nebulization, BID  clopidogrel, 75 mg, Oral, Daily  famotidine, 20 mg, Oral, BID AC  furosemide, 40 mg, Oral, Daily  heparin (porcine), 5,000 Units, Subcutaneous, " Q8H  ipratropium, 0.5 mg, Nebulization, 4x Daily - RT  levothyroxine, 125 mcg, Oral, Daily  metoprolol tartrate, 12.5 mg, Oral, Q12H  rosuvastatin, 40 mg, Oral, Nightly  sodium chloride, 10 mL, Intravenous, Q12H    Continuous Infusions     PRN Meds    acetaminophen    albuterol    senna-docusate sodium **AND** polyethylene glycol **AND** bisacodyl **AND** bisacodyl    Calcium Replacement - Follow Nurse / BPA Driven Protocol    hydrOXYzine    LORazepam    Magnesium Standard Dose Replacement - Follow Nurse / BPA Driven Protocol    nitroglycerin    ondansetron ODT **OR** ondansetron    ondansetron    Phosphorus Replacement - Follow Nurse / BPA Driven Protocol    Potassium Replacement - Follow Nurse / BPA Driven Protocol    sodium chloride    sodium chloride    sodium chloride    sodium chloride    sodium chloride    sodium chloride    sodium chloride    traZODone       Diet  Diet: Regular/House; Fluid Consistency: Thin (IDDSI 0)       Assessment/Plan     Active Hospital Problems    Diagnosis  POA    **Hematuria [R31.9]  Yes    Hemorrhagic shock [R57.8]  Unknown    Renal mass [N28.89]  Unknown    Presence of Watchman left atrial appendage closure device [Z95.818]  Unknown    CHF (congestive heart failure) [I50.9]  Yes    Hypertension [I10]  Yes    Coronary artery disease [I25.10]  Yes    COPD (chronic obstructive pulmonary disease) [J44.9]  Yes      Resolved Hospital Problems   No resolved problems to display.     Patient is a 84 y.o. male     Right renal mass  Gross hematuria, recurrent hematuria  Acute blood loss anemia and shock  Levophed off since afternoon 3/14/2025  Has received 5 total units PRBC (last 3/13/2025), 1 unit platelets. Hemoglobin stable. Monitoring daily  Cystoscopy bedside and then in OR 3/12/2025, clot evacuation. Status post CBI. Now has indwelling catheter-urology says continue for 10 days from the date of clot evacuation prior to voiding trial  Antibiotics for possible sepsis (Zosyn completed  3/15/2025)  MRI for urology completed.   Bilateral ankle swelling seems a little less today. Duplex no clot    A-fib status post Watchman  History of ICD  Anticoagulation switched to DAPT 6 months-currently on hold due to hematuria. Cardiology restarted Plavix yesterday and monitoring for recurrence of bleeding    COPD  AISSATOU  Chronic hypoxic respiratory failure  Flow (L/min) (Oxygen Therapy):  [3] 3 (baseline 3 L/min)    Hypertension  Hyperlipidemia    DVT prophylaxis   SCDs    Discharge  Home soon 1 to 2 days when okay with cardiology and urology  Expected Discharge Date: 3/19/2025; Expected Discharge Time:     Discussed with patient and family    Boris Espinoza MD  [facility]  03/18/25 13:57 EDT

## 2025-03-18 NOTE — PLAN OF CARE
Goal Outcome Evaluation:  Plan of Care Reviewed With: patient, spouse           Outcome Evaluation: Slowly improving activity tolerance and independence w/ mobility duringPT.  Able to ambulate 150' w/ CGA using RW, on 3L NC.  PLanning for DC to home w/ home PT, RW delivered.    Anticipated Discharge Disposition (PT): home with home health

## 2025-03-18 NOTE — CASE MANAGEMENT/SOCIAL WORK
Continued Stay Note  [facility]     Patient Name: Vijay Foote  MRN: [id number]  Today's Date: 3/18/2025    Admit Date: 3/10/2025    Plan: [facility] home health accepted.   Discharge Plan       Row Name 03/18/25 1024       Plan    Plan A home health accepted.    Plan Comments A homehealth accepted. orders received.                      Expected Discharge Date and Time       Expected Discharge Date Expected Discharge Time    Mar 19, 2025               Lashanda Dominguez RN

## 2025-03-18 NOTE — PROGRESS NOTES
LOS: 7 days   Patient Care Team:  German Mccray MD as PCP - General (Internal Medicine)  Alicia Pickens MD as Consulting Physician (Interventional Cardiology)  Armani Siddiqui MD as Consulting Physician (Pulmonary Disease)    Chief Complaint: Follow-up hematuria, antiplatelet therapy, watchman, persistent atrial fibrillation.    Interval History: Doing better.  He was resumed on Plavix yesterday.  No hematuria as of this morning.  No chest pain or shortness of breath currently.  His heart rate has been a little bit more elevated.    Vital Signs:  Temp:  [98 °F (36.7 °C)-98.4 °F (36.9 °C)] 98.2 °F (36.8 °C)  Heart Rate:  [] 81  Resp:  [18-21] 18  BP: ()/(56-75) 120/70    Intake/Output Summary (Last 24 hours) at 3/18/2025 1544  Last data filed at 3/18/2025 0600  Gross per 24 hour   Intake --   Output 5550 ml   Net -5550 ml       Physical Exam:   General Appearance:    No acute distress, alert and oriented x4   Lungs:     Clear to auscultation bilaterally     Heart:    Irregularly irregular rhythm and mildly tachycardic rate.     Abdomen:     Soft, nontender, nondistended.    Extremities:   No clubbing, cyanosis, or edema.     Results Review:    Results from last 7 days   Lab Units 03/18/25  0740   SODIUM mmol/L 143   POTASSIUM mmol/L 4.1   CHLORIDE mmol/L 106   CO2 mmol/L 27.9   BUN mg/dL 8   CREATININE mg/dL 0.83   GLUCOSE mg/dL 96   CALCIUM mg/dL 9.1         Results from last 7 days   Lab Units 03/18/25  0740   WBC 10*3/mm3 6.82   HEMOGLOBIN g/dL 9.6*   HEMATOCRIT % 31.0*   PLATELETS 10*3/mm3 194             Results from last 7 days   Lab Units 03/18/25  0740   MAGNESIUM mg/dL 2.2           I reviewed the patient's new clinical results.        Assessment:  1.  Gross hematuria  2.  Right renal mass  3.  Persistent atrial fibrillation  4.  Status post watchman on 2/24/2025  5.  Cardiomyopathy, EF 30 to 35%  6.  Status post biventricular pacemaker  7.  Coronary artery disease, status  post prior stent to the RCA and LAD  8.  Chronic hypoxic respiratory failure (on 3 L at baseline)    Plan:  -His blood pressure has been on the softer side.  I did adjust some of his medications.  His heart rate has been slightly tachycardic.  I gave him a dose of IV digoxin and resumed his metoprolol 12.5 mg twice daily (he normally takes 25 mg twice daily).  I also resumed his Lasix at 40 mg/day.  He will hold his lisinopril for now.    -Plavix resumed as of yesterday.  No hematuria as of this morning.  Continue to monitor.    -Hemoglobin is stable.    -Status post watchman on 2/24/2025.    Davis Santana MD  03/18/25  15:44 EDT

## 2025-03-19 ENCOUNTER — READMISSION MANAGEMENT (OUTPATIENT)
Dept: CALL CENTER | Facility: HOSPITAL | Age: 85
End: 2025-03-19
Payer: MEDICARE

## 2025-03-19 VITALS
SYSTOLIC BLOOD PRESSURE: 96 MMHG | DIASTOLIC BLOOD PRESSURE: 62 MMHG | HEART RATE: 79 BPM | HEIGHT: 67 IN | BODY MASS INDEX: 25.43 KG/M2 | TEMPERATURE: 97.4 F | OXYGEN SATURATION: 100 % | WEIGHT: 162.04 LBS | RESPIRATION RATE: 18 BRPM

## 2025-03-19 LAB
ALBUMIN SERPL-MCNC: 3.1 G/DL (ref 3.5–5.2)
ALBUMIN/GLOB SERPL: 1 G/DL
ALP SERPL-CCNC: 54 U/L (ref 39–117)
ALT SERPL W P-5'-P-CCNC: 15 U/L (ref 1–41)
ANION GAP SERPL CALCULATED.3IONS-SCNC: 8 MMOL/L (ref 5–15)
AST SERPL-CCNC: 22 U/L (ref 1–40)
BILIRUB SERPL-MCNC: 0.4 MG/DL (ref 0–1.2)
BUN SERPL-MCNC: 9 MG/DL (ref 8–23)
BUN/CREAT SERPL: 9.8 (ref 7–25)
CALCIUM SPEC-SCNC: 9.2 MG/DL (ref 8.6–10.5)
CHLORIDE SERPL-SCNC: 104 MMOL/L (ref 98–107)
CO2 SERPL-SCNC: 29 MMOL/L (ref 22–29)
CREAT SERPL-MCNC: 0.92 MG/DL (ref 0.76–1.27)
DEPRECATED RDW RBC AUTO: 50 FL (ref 37–54)
EGFRCR SERPLBLD CKD-EPI 2021: 83.1 ML/MIN/1.73
ERYTHROCYTE [DISTWIDTH] IN BLOOD BY AUTOMATED COUNT: 15.2 % (ref 12.3–15.4)
GLOBULIN UR ELPH-MCNC: 3 GM/DL
GLUCOSE BLDC GLUCOMTR-MCNC: 124 MG/DL (ref 70–130)
GLUCOSE SERPL-MCNC: 127 MG/DL (ref 65–99)
HCT VFR BLD AUTO: 34.1 % (ref 37.5–51)
HGB BLD-MCNC: 10.4 G/DL (ref 13–17.7)
MAGNESIUM SERPL-MCNC: 2.2 MG/DL (ref 1.6–2.4)
MCH RBC QN AUTO: 27.9 PG (ref 26.6–33)
MCHC RBC AUTO-ENTMCNC: 30.5 G/DL (ref 31.5–35.7)
MCV RBC AUTO: 91.4 FL (ref 79–97)
PHOSPHATE SERPL-MCNC: 2.3 MG/DL (ref 2.5–4.5)
PLATELET # BLD AUTO: 203 10*3/MM3 (ref 140–450)
PMV BLD AUTO: 9 FL (ref 6–12)
POTASSIUM SERPL-SCNC: 3.9 MMOL/L (ref 3.5–5.2)
PROT SERPL-MCNC: 6.1 G/DL (ref 6–8.5)
RBC # BLD AUTO: 3.73 10*6/MM3 (ref 4.14–5.8)
SODIUM SERPL-SCNC: 141 MMOL/L (ref 136–145)
WBC NRBC COR # BLD AUTO: 7.27 10*3/MM3 (ref 3.4–10.8)

## 2025-03-19 PROCEDURE — 84100 ASSAY OF PHOSPHORUS: CPT | Performed by: UROLOGY

## 2025-03-19 PROCEDURE — 85027 COMPLETE CBC AUTOMATED: CPT | Performed by: INTERNAL MEDICINE

## 2025-03-19 PROCEDURE — 83735 ASSAY OF MAGNESIUM: CPT | Performed by: UROLOGY

## 2025-03-19 PROCEDURE — 82948 REAGENT STRIP/BLOOD GLUCOSE: CPT

## 2025-03-19 PROCEDURE — 94799 UNLISTED PULMONARY SVC/PX: CPT

## 2025-03-19 PROCEDURE — 94664 DEMO&/EVAL PT USE INHALER: CPT

## 2025-03-19 PROCEDURE — 80053 COMPREHEN METABOLIC PANEL: CPT | Performed by: UROLOGY

## 2025-03-19 PROCEDURE — 99232 SBSQ HOSP IP/OBS MODERATE 35: CPT | Performed by: INTERNAL MEDICINE

## 2025-03-19 PROCEDURE — 94761 N-INVAS EAR/PLS OXIMETRY MLT: CPT

## 2025-03-19 PROCEDURE — 25010000002 HEPARIN (PORCINE) PER 1000 UNITS: Performed by: STUDENT IN AN ORGANIZED HEALTH CARE EDUCATION/TRAINING PROGRAM

## 2025-03-19 RX ORDER — METOPROLOL TARTRATE 25 MG/1
25 TABLET, FILM COATED ORAL EVERY 12 HOURS SCHEDULED
Status: DISCONTINUED | OUTPATIENT
Start: 2025-03-19 | End: 2025-03-19 | Stop reason: HOSPADM

## 2025-03-19 RX ORDER — ARFORMOTEROL TARTRATE 15 UG/2ML
15 SOLUTION RESPIRATORY (INHALATION)
Qty: 120 ML | Refills: 0 | Status: SHIPPED | OUTPATIENT
Start: 2025-03-19

## 2025-03-19 RX ORDER — FAMOTIDINE 20 MG/1
20 TABLET, FILM COATED ORAL
Qty: 60 TABLET | Refills: 0 | Status: SHIPPED | OUTPATIENT
Start: 2025-03-19

## 2025-03-19 RX ADMIN — FAMOTIDINE 20 MG: 20 TABLET, FILM COATED ORAL at 05:57

## 2025-03-19 RX ADMIN — METOPROLOL TARTRATE 25 MG: 25 TABLET, FILM COATED ORAL at 09:27

## 2025-03-19 RX ADMIN — IPRATROPIUM BROMIDE 0.5 MG: 0.5 SOLUTION RESPIRATORY (INHALATION) at 11:04

## 2025-03-19 RX ADMIN — LEVOTHYROXINE SODIUM 125 MCG: 125 TABLET ORAL at 09:27

## 2025-03-19 RX ADMIN — IPRATROPIUM BROMIDE 0.5 MG: 0.5 SOLUTION RESPIRATORY (INHALATION) at 07:23

## 2025-03-19 RX ADMIN — ARFORMOTEROL TARTRATE 15 MCG: 15 SOLUTION RESPIRATORY (INHALATION) at 07:24

## 2025-03-19 RX ADMIN — FUROSEMIDE 40 MG: 40 TABLET ORAL at 09:29

## 2025-03-19 RX ADMIN — CLOPIDOGREL BISULFATE 75 MG: 75 TABLET ORAL at 09:26

## 2025-03-19 RX ADMIN — BUDESONIDE 1 MG: 1 INHALANT ORAL at 07:25

## 2025-03-19 RX ADMIN — HEPARIN SODIUM 5000 UNITS: 5000 INJECTION INTRAVENOUS; SUBCUTANEOUS at 05:57

## 2025-03-19 RX ADMIN — Medication 10 ML: at 09:31

## 2025-03-19 NOTE — PLAN OF CARE
Goal Outcome Evaluation:  Plan of Care Reviewed With: patient        Progress: improving  Outcome Evaluation: vss, no complaints of pain. cath care completed. pt's urine is back to clear yellow during the shift. pt rested well during shift. labs in am. poss d/c home today.

## 2025-03-19 NOTE — DISCHARGE SUMMARY
Patient Name: Vijay Foote  : 1940  MRN: [id number]    Date of Admission: 3/10/2025  Date of Discharge:  3/19/2025  Primary Care Physician: German Mccray MD      Chief Complaint:   Abdominal Pain and Blood in Urine      Discharge Diagnoses     Active Hospital Problems    Diagnosis  POA    **Hematuria [R31.9]  Yes    Hemorrhagic shock [R57.8]  Unknown    Renal mass [N28.89]  Unknown    Presence of Watchman left atrial appendage closure device [Z95.818]  Unknown    CHF (congestive heart failure) [I50.9]  Yes    Hypertension [I10]  Yes    Coronary artery disease [I25.10]  Yes    COPD (chronic obstructive pulmonary disease) [J44.9]  Yes      Resolved Hospital Problems   No resolved problems to display.        Hospital Course     Mr. Foote is a 84 y.o. male with a history of renal cell carcinoma, atrial fibrillation s/p watchman, history of ICD, COPD on home oxygen who presented to [facility] initially complaining of hematuria.  Please see the admitting history and physical for further details.  He was admitted to the hospital for further evaluation and treatment.      Patient was admitted to the floor but ultimately developed worsening bleeding and shock and was transferred to the ICU.  He required numerous blood transfusion and vasopressor support.  While in the ICU, he underwent a emergent cystoscopy with clot evacuation.  Cystoscopy revealed large posterior membranous/prostatic urethral false passage, 100 mL of dark dense clot in the bladder which was evacuated.  The patient was connected to CBI postoperatively.  The patient also completed a course of empiric antibiotics. Patient underwent an MRI abdomen/pelvis to evaluate for possible IVC thrombus.  MRI did not show any clear evidence of tumor thrombus.  It did redemonstrate the right kidney mass.  Full report as below.  Hemoglobin was improved on day of discharge.  Ultimately, urology was agreeable with discharge home.  The  patient is being discharged with an indwelling Ayala catheter and urology will arrange outpatient follow-up in their clinic.  Plan is for nephroureterectomy in April.    Prior to admission, the patient was on dual antiplatelet therapy due to recent Watchman procedure.  This was held during the bleeding episode.  Cardiology was consulted for further evaluation.  Plavix was ultimately restarted and the patient was monitored to ensure no persistent hematuria.  He had mild hematuria which self resolved.  Cardiology would like the patient to continue monotherapy with Plavix at discharge and continue holding the aspirin.  While in the ICU, the patient did complete an empiric course of antibiotic therapy.  Cultures were no growth at time of discharge.  Overall, the patient had good clinical improvement.  All consulting services were agreeable discharge home.  The patient should follow-up with consulting providers as above.  Recommend PCP follow-up within 1 week of hospital discharge.  Patient is being discharged in satisfactory condition.    Day of Discharge     Subjective:  No acute events overnight.  Patient states he is feeling well today.  No further episodes of hematuria.  No chest pain or shortness of breath.  Hemoglobin stable.  Patient eager for discharge home.    Physical Exam:  Temp:  [97.6 °F (36.4 °C)-97.9 °F (36.6 °C)] 97.7 °F (36.5 °C)  Heart Rate:  [70-96] 79  Resp:  [16-25] 18  BP: ()/(47-78) 96/61  Body mass index is 25.38 kg/m².  Physical Exam  General: Alert, no acute distress.  Sitting up in chair at bedside.  ENT: No conjunctival injection or scleral icterus. Moist mucous membranes.  Neuro: Eyes open and moving in all directions, strength normal in all extremities, no focal deficits.   Lungs: Clear to auscultation bilaterally. No wheeze or crackles. No distress.   Heart: RRR, no murmurs. No edema.  Abdomen: Soft, non-tender, non-distended. Normal bowel sounds.  Ayala catheter in place with yellow  urine output.  Ext: Warm and well-perfused. No edema.   Skin: No rashes or lesions. IV site without swelling or erythema.     Consultants     Consult Orders (all) (From admission, onward)       Start     Ordered    03/11/25 0843  Inpatient Pulmonology Consult  STAT        Specialty:  Pulmonary Disease  Provider:  Dony Spears,     03/11/25 0842    03/11/25 0827  Inpatient Cardiology Consult  Once        Specialty:  Cardiology  Provider:  Davis Santana MD    03/11/25 0827    03/11/25 0702  Urology (on-call MD unless specified)  IN AM        Specialty:  Urology  Provider:  Neno Garcia MD    03/10/25 2228    03/11/25 0452  Inpatient Nutrition Consult  Once        Provider:  (Not yet assigned)    03/11/25 0451    03/10/25 2228  [facility] (on-call MD unless specified) Details  Once        Specialty:  Hospitalist  Provider:  (Not yet assigned)    03/10/25 2228                  Procedures     CYSTOSCOPY, clot evacuation, catheter insertion    Imaging Results (All)       Procedure Component Value Units Date/Time    MRI Abdomen With & Without Contrast [[id number]] Collected: 03/17/25 1630     Updated: 03/17/25 1711    Narrative:      MRI ABDOMEN WITH AND WITHOUT CONTRAST     HISTORY: Right renal mass     TECHNIQUE: Multiplanar multisequence MRI of the abdomen was performed  before and after the administration of IV Multihance.      COMPARISON: CT abdomen pelvis 3/10/2025     FINDINGS: Exam is significant limited by artifact. Diffusion weighted  imaging is nondiagnostic. T2 weighted imaging is not fat-saturated.  Arterial phase is early/angiographic, limiting evaluation.     A 9.6 x 8 cm enhancing solid mass arising exophytically from the right  kidney inferior pole (series 602/image 66). Mass widely abuts the  posterior right hepatic lobe (series 602/image 66). Mass widely abuts  the posterior and lateral most abdominal fascial surfaces (series  602/image 66). Main right renal vein nearest the IVC is  patent without  evidence of tumor thrombus. Blooming/susceptibility artifact in the IVC  at site of calcification on recent CT (series 602/image 49). No  enhancing soft tissue adjacent to the blooming/susceptibility artifact.  Early right renal artery bifurcation versus two right renal arteries  (series 601 images 50 through 57). Single left renal artery. Patent left  renal vein. Nonenhancing right renal cyst. Additional subcentimeter  nonenhancing likely left renal cysts are incompletely characterized  given T2 weighted imaging. Foci of intrinsic T1 hyperintense signal  within a few right renal calyces (series 600/images 37 and 52).  Unchanged mild right-sided hydronephrosis, similar to prior CT. No  abdominal lymphadenopathy.     A 3.6 cm infrarenal abdominal aortic aneurysm (series 602/image 79). New  small bilateral pleural effusions. Spleen, adrenal glands, pancreas,  liver, gallbladder/biliary tree and bowel are normal in limited MR  appearance. Exam protocol and artifact limit evaluation.                Impression:      1. Exam is significantly limited by artifact. Diffusion weighted imaging  is nondiagnostic.  2. A 9.6 cm enhancing solid mass arising exophytically from the right  kidney inferior pole likely representing renal cell carcinoma. Mass  widely abuts the posterior right hepatic lobe as well as the posterior  and lateral most abdominal fascial surfaces.  3. Blooming/susceptibility artifact in the IVC at site of calcification  on recent CT. No enhancing soft tissue adjacent to the  blooming/susceptibility artifact to suggest IVC tumor thrombus. Main  right renal vein nearest the IVC is patent.  4. Hemorrhagic/proteinaceous products within a few right renal calyces.  Unchanged mild right sided hydronephrosis.  5. No abdominal lymphadenopathy.  6. A 3.6 cm infrarenal abdominal aortic aneurysm.  7. New small bilateral pleural effusions.        This report was finalized on 3/17/2025 5:08 PM by   Tk Cardona M.D on Workstation: [id number]       XR Chest 1 View [[id number]] Collected: 03/14/25 0055     Updated: 03/14/25 0100    Narrative:      SINGLE VIEW OF THE CHEST; KUB     HISTORY: Shortness of breath.     COMPARISON: March 13, 2025     FINDINGS:  Chest: Right internal jugular vein central venous line and left-sided  pacemaker are again noted. There is cardiomegaly. There is vascular  congestion. Bibasilar consolidation and bilateral effusions are noted.  No pneumothorax is seen. Patient status post right shoulder  arthroplasty.     KUB: Visualized bowel gas pattern is nonobstructive. No definite free  air is seen. There is a catheter noted within the pelvis. Left renal  stone is noted.          Impression:         1. Stable findings within the chest.  2. No evidence of bowel obstruction.     This report was finalized on 3/14/2025 12:57 AM by Dr. Lili Vann M.D on Workstation: [id number]       XR Abdomen KUB [[id number]] Collected: 03/14/25 0055     Updated: 03/14/25 0100    Narrative:      SINGLE VIEW OF THE CHEST; KUB     HISTORY: Shortness of breath.     COMPARISON: March 13, 2025     FINDINGS:  Chest: Right internal jugular vein central venous line and left-sided  pacemaker are again noted. There is cardiomegaly. There is vascular  congestion. Bibasilar consolidation and bilateral effusions are noted.  No pneumothorax is seen. Patient status post right shoulder  arthroplasty.     KUB: Visualized bowel gas pattern is nonobstructive. No definite free  air is seen. There is a catheter noted within the pelvis. Left renal  stone is noted.          Impression:         1. Stable findings within the chest.  2. No evidence of bowel obstruction.     This report was finalized on 3/14/2025 12:57 AM by Dr. Lili Vann M.D on Workstation: [id number]       XR Chest 1 View [[id number]] Collected: 03/13/25 1738     Updated: 03/13/25 1743    Narrative:      XR CHEST 1 VW-        INDICATION:  Shortness of air     COMPARISON: Chest radiograph March 11, 2025     TECHNIQUE: 1 view chest     FINDINGS:      Calcified pulmonary nodule in the right upper lung, consistent with  prior granulomatous infection. Increased lung markings. Heterogeneous  basilar opacities. Blunting costophrenic angles. Stable mediastinum.  Left-sided cardiac resynchronization therapy defibrillator with a right  atrial, right ventricular and coronary sinus lead. Right IJ catheter tip  is in the distal SVC. Total right shoulder arthroplasty.       Impression:         1. New small pleural effusions.  2. New basilar lung opacities.  3. Suspect airways disease     This report was finalized on 3/13/2025 5:40 PM by Dr. Manoj Page M.D on Workstation: [id number]       FL Retrograde Pyelogram In OR [[id number]] Collected: 03/12/25 0652     Updated: 03/12/25 0657    Narrative:      PORTABLE RETROGRADE PYELOGRAM     HISTORY: Very large suspicious right renal mass noted on CT scan  performed 2 days ago. Multiple large left renal stones. Enlarged  prostate.     A single image in the operating room demonstrates a large stone in the  upper portion of the left renal pelvis measuring up to 2.1 cm as also  noted on the CT scan. There is a Ayala catheter within the bladder and  the operative report describes inability to exchange the catheter are  perform retrograde evaluation.     1 image was obtained and the fluoroscopy time measures 6 seconds. The  dose Kerma measures 0.3 mGy.     This report was finalized on 3/12/2025 6:54 AM by Dr. Prudencio Mays M.D  on Workstation: [id number]       XR Chest Post CVA Port [[id number]] Collected: 03/11/25 1027     Updated: 03/11/25 1033    Narrative:      XR CHEST POST CVA PORT-     INDICATIONS: Central line placement     TECHNIQUE: FRONTAL VIEWS OF THE CHEST     COMPARISON: 8/2/2022     FINDINGS:     Right IJ catheter extends to the superior vena cava. Left-sided  pacemaker and cardiac leads are seen. The  heart size is normal.  Pulmonary vasculature is unremarkable. No focal pulmonary consolidation,  pleural effusion, or pneumothorax. Old granulomatous disease is  apparent. No acute osseous process.       Impression:         Central line placement. No pneumothorax.           This report was finalized on 3/11/2025 10:30 AM by Dr. Hayden Cooney M.D on Workstation: [id number]       CT Abdomen Pelvis Without Contrast [[id number]] Collected: 03/10/25 2156     Updated: 03/10/25 2206    Narrative:      CT ABDOMEN AND PELVIS WITHOUT CONTRAST:     HISTORY: Lower abdominal pain with hematuria and difficulty urinating,  history of kidney stones, history of renal neoplasm     TECHNIQUE: Helical CT was performed of the abdomen and pelvis from the  lung bases through the lesser trochanters without IV contrast.  Reformatted images were provided in the coronal and sagittal planes.  Radiation dose reduction techniques were utilized, including automated  exposure control, and exposure modulation based on body size.     COMPARISON: None available.     FINDINGS:     Lung bases: No acute findings, elevation of the right hemidiaphragm.     Abdomen: The liver is unremarkable. There is cholelithiasis, but no CT  evidence of cholecystitis or biliary obstruction. The spleen and  pancreas appear normal. No evidence of acute abnormality in either  adrenal gland. Left kidney demonstrates multiple calculi, including an  up to 2.3 cm developing staghorn calculus. There is no hydronephrosis or  ureterolithiasis. There is a very large right renal lower pole  solid-appearing mass, about 9 cm in overall size. Hyperdense material in  the right renal collecting system and ureter may represent blood.     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 There is no suspicious  retroperitoneal adenopathy.      There is no evidence of bowel obstruction. There is an up to 3.6  cm  abdominal aortic aneurysm.     Pelvis: There is a Ayala catheter in place and there is prostatic  hypertrophy, but there appears to be blood within the urinary bladder as  well. There is no pelvic inflammatory change or other abnormal fluid  collection. There is extensive sigmoid diverticulosis but no CT evidence  of diverticulitis. There is no CT evidence of hernia or bowel  obstruction.     There are spinal degenerative changes, but there is no acute bony  abnormality.       Impression:         Large right renal mass without hydronephrosis no dense material in the  right renal collecting system, ureter and urinary bladder likely  represent hematuria.     Multiple left renal calculi without hydronephrosis or ureterolithiasis.     Area of hyperdensity within the inferior vena cava adjacent to the renal  midpole question caval tumor thrombus.     3.6 cm abdominal aortic aneurysm.     Extensive sigmoid diverticulosis without evidence of diverticulitis.              This report was finalized on 3/10/2025 10:03 PM by Dr. Vijay Stinson M.D on Workstation: [id number]             Results for orders placed during the hospital encounter of 03/10/25    Duplex Venous Lower Extremity - Bilateral CAR    Interpretation Summary    Normal bilateral lower extremity venous duplex scan.      Pertinent Labs     Results from last 7 days   Lab Units 03/19/25  0948 03/18/25  0740 03/17/25  0615 03/16/25  0513   WBC 10*3/mm3 7.27 6.82 5.74 5.72   HEMOGLOBIN g/dL 10.4* 9.6* 8.8* 8.8*   PLATELETS 10*3/mm3 203 194 187 162     Results from last 7 days   Lab Units 03/19/25  0948 03/18/25  0740 03/17/25  0615 03/16/25  0513   SODIUM mmol/L 141 143 141 140   POTASSIUM mmol/L 3.9 4.1 4.3 3.8   CHLORIDE mmol/L 104 106 106 106   CO2 mmol/L 29.0 27.9 28.9 26.0   BUN mg/dL 9 8 8 8   CREATININE mg/dL 0.92 0.83 0.85 0.82   GLUCOSE mg/dL 127* 96 101* 102*   EGFR mL/min/1.73 83.1 87.4 86.8 87.7     Results from last 7 days   Lab Units  "03/19/25  0948 03/18/25  0740 03/17/25  0615 03/16/25  0513   ALBUMIN g/dL 3.1* 2.5* 2.7* 2.6*   BILIRUBIN mg/dL 0.4 0.4 0.4 0.4   ALK PHOS U/L 54 44 43 42   AST (SGOT) U/L 22 19 18 19   ALT (SGPT) U/L 15 11 11 15     Results from last 7 days   Lab Units 03/19/25  0948 03/18/25  0740 03/17/25  0615 03/16/25  0513   CALCIUM mg/dL 9.2 9.1 9.2 8.7   ALBUMIN g/dL 3.1* 2.5* 2.7* 2.6*   MAGNESIUM mg/dL 2.2 2.2 2.0 1.9   PHOSPHORUS mg/dL 2.3* 2.3* 2.6 2.3*               Invalid input(s): \"LDLCALC\"          Test Results Pending at Discharge     Pending Results       Procedure [Order ID] Specimen - Date/Time    Urinalysis With Microscopic If Indicated (No Culture) - Indwelling Urethral Catheter [[id number]] Updated: 03/10/25 2304    Specimen: Urine from Indwelling Urethral Catheter               Discharge Details        Discharge Medications        New Medications        Instructions Start Date   arformoterol 15 MCG/2ML nebulizer solution  Commonly known as: BROVANA   15 mcg, Nebulization, 2 Times Daily - RT      famotidine 20 MG tablet  Commonly known as: PEPCID   20 mg, Oral, 2 Times Daily Before Meals             Changes to Medications        Instructions Start Date   albuterol sulfate  (90 Base) MCG/ACT inhaler  Commonly known as: PROVENTIL HFA;VENTOLIN HFA;PROAIR HFA  What changed: Another medication with the same name was removed. Continue taking this medication, and follow the directions you see here.   2 puffs, Every 4 Hours PRN             Continue These Medications        Instructions Start Date   budesonide 1 MG/2ML nebulizer solution  Commonly known as: PULMICORT   1 mg, 2 Times Daily      Chlorhexidine Gluconate Cloth 2 % pads   Apply externally      clopidogrel 75 MG tablet  Commonly known as: PLAVIX   75 mg, Oral, Daily      Fasenra Pen 30 MG/ML solution auto-injector  Generic drug: Benralizumab   Inject  under the skin into the appropriate area as directed. INJECTION EVERY 8 WEEKS      furosemide 40 MG " tablet  Commonly known as: LASIX   40 mg, Daily      ipratropium 0.02 % nebulizer solution  Commonly known as: ATROVENT   500 mcg, 4 Times Daily - RT      levothyroxine 125 MCG tablet  Commonly known as: SYNTHROID, LEVOTHROID   125 mcg, Daily      metoprolol tartrate 25 MG tablet  Commonly known as: LOPRESSOR   25 mg, 2 Times Daily      mupirocin 2 % nasal ointment  Commonly known as: BACTROBAN   Nasal, 2 Times Daily      rosuvastatin 40 MG tablet  Commonly known as: CRESTOR   40 mg, Nightly             Stop These Medications      aspirin 81 MG chewable tablet     lisinopril 2.5 MG tablet  Commonly known as: PRINIVIL,ZESTRIL              No Known Allergies    Discharge Disposition:  Home or Self Care      Discharge Diet:  Diet Order   Procedures    Diet: Regular/House; Fluid Consistency: Thin (IDDSI 0)       Discharge Activity: Activity as tolerated      CODE STATUS:    Code Status and Medical Interventions: CPR (Attempt to Resuscitate); Full   Ordered at: 03/10/25 4606     Code Status (Patient has no pulse and is not breathing):    CPR (Attempt to Resuscitate)     Medical Interventions (Patient has pulse or is breathing):    Full       Future Appointments   Date Time Provider Department Center   4/2/2025  1:00 PM  [facility]  [facility]     Additional Instructions for the Follow-ups that You Need to Schedule       Ambulatory Referral to Home Health   As directed      Face to Face Visit Date: 3/18/2025   Follow-up provider for Plan of Care?: I will be treating the patient on an ongoing basis.  Please send me the Plan of Care for signature.   Follow-up provider: BRYAN SMITH [[id number]]   Reason/Clinical Findings: s/p hospitization, weakness,   Describe mobility limitations that make leaving home difficult: tires easliy   Nursing/Therapeutic Services Requested: Skilled Nursing Physical Therapy   Skilled nursing orders: Medication education COPD management Cardiopulmonary assessments   PT orders: Other (add  comment) (Treat and eval) Strengthening   Frequency: 1 Week 1               Contact information for follow-up providers       German Mccray MD .    Specialty: Internal Medicine  Contact information:  [address]  [facility] [address] [phone]                       Contact information for after-discharge care       Home Medical Care       [facility] .    Services: Home Rehabilitation, Home Nursing  Contact information:  [address], [address]  [facility] [address] [phone]                                   Additional Instructions for the Follow-ups that You Need to Schedule       Ambulatory Referral to Home Health   As directed      Face to Face Visit Date: 3/18/2025   Follow-up provider for Plan of Care?: I will be treating the patient on an ongoing basis.  Please send me the Plan of Care for signature.   Follow-up provider: GERMAN MCCRAY [[id number]]   Reason/Clinical Findings: s/p hospitization, weakness,   Describe mobility limitations that make leaving home difficult: tires easliy   Nursing/Therapeutic Services Requested: Skilled Nursing Physical Therapy   Skilled nursing orders: Medication education COPD management Cardiopulmonary assessments   PT orders: Other (add comment) (Treat and eval) Strengthening   Frequency: 1 Week 1            Time Spent on Discharge:  Greater than 30 minutes      Rosita Garcia MD  [facility]  03/19/25  13:44 EDT

## 2025-03-19 NOTE — PROGRESS NOTES
LOS: 8 days   Patient Care Team:  German Mccray MD as PCP - General (Internal Medicine)  Alicia Pickens MD as Consulting Physician (Interventional Cardiology)  Armani Siddiqui MD as Consulting Physician (Pulmonary Disease)    Chief Complaint: Follow-up hematuria, antiplatelet therapy, watchman, persistent atrial fibrillation.    Interval History: Doing better.  Heart rate is better.  Some mild hematuria overnight, although cleared now.  No chest pain or shortness of breath.    Vital Signs:  Temp:  [97.6 °F (36.4 °C)-98 °F (36.7 °C)] 97.7 °F (36.5 °C)  Heart Rate:  [70-96] 96  Resp:  [16-25] 20  BP: ()/(47-78) 96/61    Intake/Output Summary (Last 24 hours) at 3/19/2025 1016  Last data filed at 3/19/2025 0502  Gross per 24 hour   Intake --   Output 4000 ml   Net -4000 ml       Physical Exam:   General Appearance:    No acute distress, alert and oriented x4   Lungs:     Clear to auscultation bilaterally     Heart:    Irregularly irregular rhythm and mildly tachycardic rate.     Abdomen:     Soft, nontender, nondistended.    Extremities:   No clubbing, cyanosis, or edema.     Results Review:    Results from last 7 days   Lab Units 03/18/25  0740   SODIUM mmol/L 143   POTASSIUM mmol/L 4.1   CHLORIDE mmol/L 106   CO2 mmol/L 27.9   BUN mg/dL 8   CREATININE mg/dL 0.83   GLUCOSE mg/dL 96   CALCIUM mg/dL 9.1         Results from last 7 days   Lab Units 03/19/25  0948   WBC 10*3/mm3 7.27   HEMOGLOBIN g/dL 10.4*   HEMATOCRIT % 34.1*   PLATELETS 10*3/mm3 203             Results from last 7 days   Lab Units 03/18/25  0740   MAGNESIUM mg/dL 2.2           I reviewed the patient's new clinical results.        Assessment:  1.  Gross hematuria  2.  Right renal mass  3.  Persistent atrial fibrillation  4.  Status post watchman on 2/24/2025  5.  Cardiomyopathy, EF 30 to 35%  6.  Status post biventricular pacemaker  7.  Coronary artery disease, status post prior stent to the RCA and LAD  8.  Chronic hypoxic  respiratory failure (on 3 L at baseline)    Plan:  -Heart rate is much better.  I increased his metoprolol back to his home dose of 25 mg twice daily.  He also feels better since resuming the Lasix yesterday.  I would continue to hold his lisinopril for now until we see how his blood pressure is going to run at home.    -Plavix resumed 3/17/2025.  Mild hematuria overnight which is now resolved.    -Planning on nephrectomy in April.    -Status post watchman on 2/24/2025.  Continue Plavix.  Given the hematuria, I would hold the aspirin for now.    -He is going to follow-up with [facility] within the next several weeks.  Okay to discharge today from a cardiology standpoint.    Davis Santana MD  03/19/25  10:16 EDT

## 2025-03-19 NOTE — PROGRESS NOTES
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 Plavix restarted. He reports bleeding around the catheter for most of the day with only one episode of blood in the catheter.    NAD, A&Ox3  Good UO  Abd soft, nondistended  Ayala intact, urine yellow and clear.    Cr 0.83  WBC 6.8, Hb 9.6    MRI personally viewed. No evidence of tumor thrombus.    Plan:  - ambulate  - encouraged increasing food intake, particularly protein  - continue indwelling catheter. We will plan to leave it intact upon discharge  - we discussed the MRI results. We are cleared to move forward with nephroureterectomy in April  - safe for discharge at any time from urology standpoint.

## 2025-03-19 NOTE — CASE MANAGEMENT/SOCIAL WORK
Continued Stay Note  [facility]     Patient Name: Vijay Foote  MRN: [id number]  Today's Date: 3/19/2025    Admit Date: 3/10/2025    Plan: [facility] home health accepted.   Discharge Plan       Row Name 03/19/25 1312       Plan    Plan Comments Notified Saqib MENSAH that patient will dc today with f/c. verbalizes understanding.                      Expected Discharge Date and Time       Expected Discharge Date Expected Discharge Time    Mar 19, 2025               Lashanda Dominguez RN

## 2025-03-20 NOTE — CASE MANAGEMENT/SOCIAL WORK
Case Management Discharge Note      Final Note: Home with [facility]    Provided Post Acute Provider List?: Yes  Post Acute Provider List: DME Supplier, Home Health  Provided Post Acute Provider Quality & Resource List?: Yes  Post Acute Provider Quality and Resource List: Home Health, Inpatient Rehab, Nursing Home  Delivered To: Patient  Method of Delivery: In person    Selected Continued Care - Discharged on 3/19/2025 Admission date: 3/10/2025 - Discharge disposition: Home or Self Care      Destination    No services have been selected for the patient.                Durable Medical Equipment    No services have been selected for the patient.                Dialysis/Infusion    No services have been selected for the patient.                Home Medical Care Coordination complete.      Service Provider Services Address Phone Fax Patient Preferred    [facility] Home Nursing [address] [place] [address], [address] [id number] [phone] --              Therapy    No services have been selected for the patient.                Community Resources    No services have been selected for the patient.                Community & DME    No services have been selected for the patient.                         Final Discharge Disposition Code: 06 - home with home health care

## 2025-03-20 NOTE — OUTREACH NOTE
Prep Survey      Flowsheet Row Responses   [facility] patient discharged from? [facility]   Is LACE score < 7 ? No   Eligibility Readm Mgmt   Discharge diagnosis Hematuria discharged with an indwelling Ayala catheter   Does the patient have one of the following disease processes/diagnoses(primary or secondary)? Other   Does the patient have Home health ordered? Yes   What is the Home health agency?  [facility]   Is there a DME ordered? Yes   What DME was ordered? [facility]   continuous home o2 at 3/n/c from [facility]   Prep survey completed? Yes            KANWAL BROWN - Registered Nurse

## 2025-03-24 ENCOUNTER — READMISSION MANAGEMENT (OUTPATIENT)
Dept: CALL CENTER | Facility: HOSPITAL | Age: 85
End: 2025-03-24
Payer: MEDICARE

## 2025-03-24 NOTE — OUTREACH NOTE
Medical Week 1 Survey      Flowsheet Row Responses   [facility] patient discharged from? [facility]   Does the patient have one of the following disease processes/diagnoses(primary or secondary)? Other   Week 1 attempt successful? Yes   Call start time 1906   Call end time 1912   Discharge diagnosis Hematuria discharged with an indwelling Ayala catheter   Person spoke with today (if not patient) and relationship Murtaza/Spouse   Meds reviewed with patient/caregiver? Yes   Is the patient having any side effects they believe may be caused by any medication additions or changes? No   Does the patient have all medications ordered at discharge? Yes   Is the patient taking all medications as directed (includes completed medication regime)? Yes   Does the patient have a primary care provider?  Yes   Does the patient have an appointment with their PCP within 7 days of discharge? Yes   Has the patient kept scheduled appointments due by today? N/A   Comments States he has appt with Pulmonary on 4/2, preadmission testing next Wed and will have a nephrectomy on 4/18.   What is the Home health agency?  [facility]   Has home health visited the patient within 72 hours of discharge? Yes   What DME was ordered? [facility]   Has all DME been delivered? Yes   Psychosocial issues? No   Did the patient receive a copy of their discharge instructions? Yes   Nursing interventions Reviewed instructions with patient   What is the patient's perception of their health status since discharge? Same   Is the patient/caregiver able to teach back signs and symptoms related to disease process for when to call PCP? Yes   Is the patient/caregiver able to teach back signs and symptoms related to disease process for when to call 911? Yes   Is the patient/caregiver able to teach back the hierarchy of who to call/visit for symptoms/problems? PCP, Specialist, Home health nurse, Urgent Care, ED, 911 Yes   If the patient is a current smoker, are they able  to teach back resources for cessation? Not a smoker   Additional teach back comments States he is about the same.  Still weak, blood in urine and soa.  He is using 3L of oxgen with sats staying in the 90s.   Week 1 call completed? Yes   Wrap up additional comments Pt is following up with [facility] closely.  Has several f/u appts made and scheduled for nephrectomy on 4/18.   Call end time 1912            Bernice BARBA - Licensed Nurse

## 2025-03-25 ENCOUNTER — NURSE TRIAGE (OUTPATIENT)
Dept: CALL CENTER | Facility: HOSPITAL | Age: 85
End: 2025-03-25
Payer: MEDICARE

## 2025-03-25 NOTE — TELEPHONE ENCOUNTER
"He was discharged from [facility] on 03/19/2025- He has an indwelling rocha cath- it had an anchor, trouble getting the correct anchor. He had a band around the leg, he has severe edema around the leg.  She did remove the band device and the swelling is looking better. Will send a CM to ask them to see if can order a device for the leg.In the mean time may use paper tape to chevron around the device to hold it.    Reason for Disposition   Requesting regular office appointment    Additional Information   Negative: [1] Caller is not with the adult (patient) AND [2] reporting urgent symptoms   Negative: Lab result questions   Negative: Medication questions   Negative: Caller can't be reached by phone   Negative: Caller has already spoken to PCP or another triager   Negative: RN needs further essential information from caller in order to complete triage    Answer Assessment - Initial Assessment Questions  1. REASON FOR CALL or QUESTION: \"What is your reason for calling today?\" or \"How can I best help you?\" or \"What question do you have that I can help answer?\"      See note.    Protocols used: Information Only Call - No Triage-ADULT-    "

## 2025-04-03 ENCOUNTER — READMISSION MANAGEMENT (OUTPATIENT)
Dept: CALL CENTER | Facility: HOSPITAL | Age: 85
End: 2025-04-03
Payer: MEDICARE

## 2025-04-03 NOTE — OUTREACH NOTE
Medical Week 2 Survey      Flowsheet Row Responses   [facility] patient discharged from? [place]   Does the patient have one of the following disease processes/diagnoses(primary or secondary)? Other   Week 2 attempt successful? Yes   Call start time 1656   Discharge diagnosis Hematuria discharged with an indwelling Ayala catheter   Call end time 1659   Person spoke with today (if not patient) and relationship Murtaza, spouse   Meds reviewed with patient/caregiver? Yes   Is the patient having any side effects they believe may be caused by any medication additions or changes? No   Does the patient have all medications ordered at discharge? Yes   Prescription comments steroids added by pulmonologist   Is the patient taking all medications as directed (includes completed medication regime)? Yes   Does the patient have a primary care provider?  Yes   Does the patient have an appointment with their PCP within 7 days of discharge? Yes   Has the patient kept scheduled appointments due by today? Yes   What is [facility] health agency[facility]   Has home health visited the patient within 72 hours of discharge? Yes   Psychosocial issues? No   Did the patient receive a copy of their discharge instructions? Yes   Nursing interventions Reviewed instructions with patient   What is the patient's perception of their health status since discharge? Improving   Is the patient/caregiver able to teach back signs and symptoms related to disease process for when to call PCP? Yes   Is the patient/caregiver able to teach back signs and symptoms related to disease process for when to call 911? Yes   Is the patient/caregiver able to teach back the hierarchy of who to call/visit for symptoms/problems? PCP, Specialist, Home health nurse, Urgent Care, ED, 911 Yes   Week 2 Call Completed? Yes   Call end time 1659            Yana PADGETT - Registered Nurse

## 2025-04-10 ENCOUNTER — HOSPITAL ENCOUNTER (OUTPATIENT)
Facility: HOSPITAL | Age: 85
Setting detail: HOSPITAL OUTPATIENT SURGERY
Discharge: HOME | End: 2025-04-10
Attending: UROLOGY | Admitting: UROLOGY
Payer: MEDICARE

## 2025-04-10 ENCOUNTER — ANESTHESIA (OUTPATIENT)
Dept: PERIOP | Facility: HOSPITAL | Age: 85
End: 2025-04-10
Payer: MEDICARE

## 2025-04-10 ENCOUNTER — ANESTHESIA EVENT (OUTPATIENT)
Dept: PERIOP | Facility: HOSPITAL | Age: 85
End: 2025-04-10
Payer: MEDICARE

## 2025-04-10 ENCOUNTER — APPOINTMENT (OUTPATIENT)
Dept: GENERAL RADIOLOGY | Facility: HOSPITAL | Age: 85
End: 2025-04-10
Payer: MEDICARE

## 2025-04-10 VITALS
OXYGEN SATURATION: 100 % | RESPIRATION RATE: 16 BRPM | SYSTOLIC BLOOD PRESSURE: 111 MMHG | HEART RATE: 99 BPM | TEMPERATURE: 97.6 F | DIASTOLIC BLOOD PRESSURE: 57 MMHG

## 2025-04-10 DIAGNOSIS — C64.1 UROTHELIAL CARCINOMA OF KIDNEY, RIGHT: Primary | ICD-10-CM

## 2025-04-10 DIAGNOSIS — C80.1 CARCINOMA: ICD-10-CM

## 2025-04-10 PROCEDURE — 25810000003 LACTATED RINGERS PER 1000 ML: Performed by: ANESTHESIOLOGY

## 2025-04-10 PROCEDURE — 25010000002 PROPOFOL 10 MG/ML EMULSION: Performed by: NURSE ANESTHETIST, CERTIFIED REGISTERED

## 2025-04-10 PROCEDURE — 25010000002 CEFAZOLIN PER 500 MG: Performed by: UROLOGY

## 2025-04-10 PROCEDURE — 88305 TISSUE EXAM BY PATHOLOGIST: CPT | Performed by: UROLOGY

## 2025-04-10 PROCEDURE — 25010000002 LIDOCAINE 2% SOLUTION: Performed by: NURSE ANESTHETIST, CERTIFIED REGISTERED

## 2025-04-10 PROCEDURE — 25010000002 FAMOTIDINE 10 MG/ML SOLUTION: Performed by: ANESTHESIOLOGY

## 2025-04-10 RX ORDER — PROMETHAZINE HYDROCHLORIDE 25 MG/1
25 TABLET ORAL ONCE AS NEEDED
Status: DISCONTINUED | OUTPATIENT
Start: 2025-04-10 | End: 2025-04-10 | Stop reason: HOSPADM

## 2025-04-10 RX ORDER — CEPHALEXIN 500 MG/1
500 CAPSULE ORAL 2 TIMES DAILY
Qty: 6 CAPSULE | Refills: 0 | Status: SHIPPED | OUTPATIENT
Start: 2025-04-10 | End: 2025-04-13

## 2025-04-10 RX ORDER — ONDANSETRON 2 MG/ML
4 INJECTION INTRAMUSCULAR; INTRAVENOUS ONCE AS NEEDED
Status: DISCONTINUED | OUTPATIENT
Start: 2025-04-10 | End: 2025-04-10 | Stop reason: HOSPADM

## 2025-04-10 RX ORDER — PROMETHAZINE HYDROCHLORIDE 25 MG/1
25 SUPPOSITORY RECTAL ONCE AS NEEDED
Status: DISCONTINUED | OUTPATIENT
Start: 2025-04-10 | End: 2025-04-10 | Stop reason: HOSPADM

## 2025-04-10 RX ORDER — SODIUM CHLORIDE 0.9 % (FLUSH) 0.9 %
3-10 SYRINGE (ML) INJECTION AS NEEDED
Status: DISCONTINUED | OUTPATIENT
Start: 2025-04-10 | End: 2025-04-10 | Stop reason: HOSPADM

## 2025-04-10 RX ORDER — SODIUM CHLORIDE, SODIUM LACTATE, POTASSIUM CHLORIDE, CALCIUM CHLORIDE 600; 310; 30; 20 MG/100ML; MG/100ML; MG/100ML; MG/100ML
9 INJECTION, SOLUTION INTRAVENOUS CONTINUOUS
Status: DISCONTINUED | OUTPATIENT
Start: 2025-04-10 | End: 2025-04-10 | Stop reason: HOSPADM

## 2025-04-10 RX ORDER — OXYCODONE AND ACETAMINOPHEN 7.5; 325 MG/1; MG/1
1 TABLET ORAL EVERY 4 HOURS PRN
Status: DISCONTINUED | OUTPATIENT
Start: 2025-04-10 | End: 2025-04-10 | Stop reason: HOSPADM

## 2025-04-10 RX ORDER — NALOXONE HCL 0.4 MG/ML
0.2 VIAL (ML) INJECTION AS NEEDED
Status: DISCONTINUED | OUTPATIENT
Start: 2025-04-10 | End: 2025-04-10 | Stop reason: HOSPADM

## 2025-04-10 RX ORDER — LIDOCAINE HYDROCHLORIDE 20 MG/ML
INJECTION, SOLUTION INFILTRATION; PERINEURAL AS NEEDED
Status: DISCONTINUED | OUTPATIENT
Start: 2025-04-10 | End: 2025-04-10 | Stop reason: SURG

## 2025-04-10 RX ORDER — DIPHENHYDRAMINE HYDROCHLORIDE 50 MG/ML
12.5 INJECTION, SOLUTION INTRAMUSCULAR; INTRAVENOUS
Status: DISCONTINUED | OUTPATIENT
Start: 2025-04-10 | End: 2025-04-10 | Stop reason: HOSPADM

## 2025-04-10 RX ORDER — HYDROCODONE BITARTRATE AND ACETAMINOPHEN 5; 325 MG/1; MG/1
1 TABLET ORAL ONCE AS NEEDED
Status: DISCONTINUED | OUTPATIENT
Start: 2025-04-10 | End: 2025-04-10 | Stop reason: HOSPADM

## 2025-04-10 RX ORDER — DROPERIDOL 2.5 MG/ML
0.62 INJECTION, SOLUTION INTRAMUSCULAR; INTRAVENOUS
Status: DISCONTINUED | OUTPATIENT
Start: 2025-04-10 | End: 2025-04-10 | Stop reason: HOSPADM

## 2025-04-10 RX ORDER — CLOPIDOGREL BISULFATE 75 MG/1
75 TABLET ORAL DAILY
Start: 2025-04-16

## 2025-04-10 RX ORDER — MAGNESIUM HYDROXIDE 1200 MG/15ML
LIQUID ORAL AS NEEDED
Status: DISCONTINUED | OUTPATIENT
Start: 2025-04-10 | End: 2025-04-10 | Stop reason: HOSPADM

## 2025-04-10 RX ORDER — FLUMAZENIL 0.1 MG/ML
0.2 INJECTION INTRAVENOUS AS NEEDED
Status: DISCONTINUED | OUTPATIENT
Start: 2025-04-10 | End: 2025-04-10 | Stop reason: HOSPADM

## 2025-04-10 RX ORDER — SODIUM CHLORIDE 0.9 % (FLUSH) 0.9 %
3 SYRINGE (ML) INJECTION EVERY 12 HOURS SCHEDULED
Status: DISCONTINUED | OUTPATIENT
Start: 2025-04-10 | End: 2025-04-10 | Stop reason: HOSPADM

## 2025-04-10 RX ORDER — PROPOFOL 10 MG/ML
VIAL (ML) INTRAVENOUS AS NEEDED
Status: DISCONTINUED | OUTPATIENT
Start: 2025-04-10 | End: 2025-04-10 | Stop reason: SURG

## 2025-04-10 RX ORDER — HYDRALAZINE HYDROCHLORIDE 20 MG/ML
5 INJECTION INTRAMUSCULAR; INTRAVENOUS
Status: DISCONTINUED | OUTPATIENT
Start: 2025-04-10 | End: 2025-04-10 | Stop reason: HOSPADM

## 2025-04-10 RX ORDER — FENTANYL CITRATE 50 UG/ML
50 INJECTION, SOLUTION INTRAMUSCULAR; INTRAVENOUS
Status: DISCONTINUED | OUTPATIENT
Start: 2025-04-10 | End: 2025-04-10 | Stop reason: HOSPADM

## 2025-04-10 RX ORDER — HYDROMORPHONE HYDROCHLORIDE 1 MG/ML
0.5 INJECTION, SOLUTION INTRAMUSCULAR; INTRAVENOUS; SUBCUTANEOUS
Status: DISCONTINUED | OUTPATIENT
Start: 2025-04-10 | End: 2025-04-10 | Stop reason: HOSPADM

## 2025-04-10 RX ORDER — LABETALOL HYDROCHLORIDE 5 MG/ML
5 INJECTION, SOLUTION INTRAVENOUS
Status: DISCONTINUED | OUTPATIENT
Start: 2025-04-10 | End: 2025-04-10 | Stop reason: HOSPADM

## 2025-04-10 RX ORDER — ATROPINE SULFATE 0.4 MG/ML
0.4 INJECTION, SOLUTION INTRAMUSCULAR; INTRAVENOUS; SUBCUTANEOUS ONCE AS NEEDED
Status: DISCONTINUED | OUTPATIENT
Start: 2025-04-10 | End: 2025-04-10 | Stop reason: HOSPADM

## 2025-04-10 RX ORDER — FAMOTIDINE 10 MG/ML
20 INJECTION, SOLUTION INTRAVENOUS ONCE
Status: COMPLETED | OUTPATIENT
Start: 2025-04-10 | End: 2025-04-10

## 2025-04-10 RX ORDER — EPHEDRINE SULFATE 50 MG/ML
5 INJECTION, SOLUTION INTRAVENOUS ONCE AS NEEDED
Status: DISCONTINUED | OUTPATIENT
Start: 2025-04-10 | End: 2025-04-10 | Stop reason: HOSPADM

## 2025-04-10 RX ORDER — HYDROCODONE BITARTRATE AND ACETAMINOPHEN 5; 325 MG/1; MG/1
1 TABLET ORAL EVERY 6 HOURS PRN
Qty: 5 TABLET | Refills: 0 | Status: SHIPPED | OUTPATIENT
Start: 2025-04-10

## 2025-04-10 RX ORDER — IPRATROPIUM BROMIDE AND ALBUTEROL SULFATE 2.5; .5 MG/3ML; MG/3ML
3 SOLUTION RESPIRATORY (INHALATION) ONCE AS NEEDED
Status: DISCONTINUED | OUTPATIENT
Start: 2025-04-10 | End: 2025-04-10 | Stop reason: HOSPADM

## 2025-04-10 RX ADMIN — CEFAZOLIN 2000 MG: 2 INJECTION, POWDER, FOR SOLUTION INTRAMUSCULAR; INTRAVENOUS at 11:50

## 2025-04-10 RX ADMIN — PROPOFOL 90 MG: 10 INJECTION, EMULSION INTRAVENOUS at 12:00

## 2025-04-10 RX ADMIN — SODIUM CHLORIDE, POTASSIUM CHLORIDE, SODIUM LACTATE AND CALCIUM CHLORIDE 9 ML/HR: 600; 310; 30; 20 INJECTION, SOLUTION INTRAVENOUS at 11:06

## 2025-04-10 RX ADMIN — LIDOCAINE HYDROCHLORIDE 60 MG: 20 INJECTION, SOLUTION INFILTRATION; PERINEURAL at 12:00

## 2025-04-10 RX ADMIN — FAMOTIDINE 20 MG: 10 INJECTION, SOLUTION INTRAVENOUS at 11:16

## 2025-04-10 NOTE — H&P
FIRST UROLOGY CONSULT      Patient Identification:  NAME:  Vijay Foote  Age:  84 y.o.   Sex:  male   :  1940   MRN:  [id number]     Chief complaint: Upper tract urothelial carcinoma    History of present illness:      This is a 84 year old man with a history of a right upper tract urothelial carcinoma who is scheduled for right robotic nephroureterectomy on . He presents today for cystoscopy and bladder biopsy. We discussed the R/B/O of the procedure in detail today, including the risks of pain, infection, bleeding, damage to adjacent structures, need for additional procedures, need for a catheter, as well as the risks of anesthesia. The patient expressed understanding and wishes to proceed.    Past medical history:  Past Medical History:   Diagnosis Date    Asthma     Cardiomyopathy     COPD (chronic obstructive pulmonary disease)     Coronary artery disease     Disease of thyroid gland     Hyperlipidemia     Hypertension        Past surgical history:  Past Surgical History:   Procedure Laterality Date    CARDIAC CATHETERIZATION      CYSTOSCOPY N/A 3/12/2025    Procedure: CYSTOSCOPY, clot evacuation, catheter insertion;  Surgeon: Chance Reyes MD;  Location: [facility];  Service: Urology;  Laterality: N/A;    MASTOIDECTOMY Right     ORIF ANKLE FRACTURE Right     ROTATOR CUFF REPAIR Left     TONSILLECTOMY      TOTAL SHOULDER ARTHROPLASTY W/ DISTAL CLAVICLE EXCISION Right 2022    Procedure: RIGHT REVERSE TOTAL SHOULDER ARTHROPLASTY;  Surgeon: Wali Orellana MD;  Location: [facility];  Service: Orthopedics;  Laterality: Right;    TRIGGER FINGER RELEASE Left        Allergies:  Patient has no known allergies.    Home medications:  Medications Prior to Admission   Medication Sig Dispense Refill Last Dose/Taking    albuterol sulfate  (90 Base) MCG/ACT inhaler Inhale 2 puffs Every 4 (Four) Hours As Needed for Wheezing.   2025 at 10:00 PM    arformoterol (BROVANA) 15  MCG/2ML nebulizer solution Take 2 mL by nebulization 2 (Two) Times a Day. 120 mL 0 4/9/2025 at 10:00 PM    budesonide (PULMICORT) 1 MG/2ML nebulizer solution Take 2 mL by nebulization 2 (Two) Times a Day.   4/9/2025 at 10:00 PM    famotidine (PEPCID) 20 MG tablet Take 1 tablet by mouth 2 (Two) Times a Day Before Meals. 60 tablet 0 4/9/2025 at 12:00 PM    furosemide (LASIX) 40 MG tablet Take 1 tablet by mouth Daily.   4/9/2025 at 12:00 PM    ipratropium (ATROVENT) 0.02 % nebulizer solution Take 2.5 mL by nebulization 4 (Four) Times a Day.   4/9/2025 at 10:00 PM    levothyroxine (SYNTHROID, LEVOTHROID) 125 MCG tablet Take 1 tablet by mouth Daily.   4/9/2025 at 10:00 AM    metoprolol tartrate (LOPRESSOR) 25 MG tablet Take 1 tablet by mouth 2 (Two) Times a Day.   4/9/2025 at 10:00 PM    rosuvastatin (CRESTOR) 40 MG tablet Take 1 tablet by mouth Every Night.   4/9/2025 at 10:00 PM    Benralizumab (Fasenra Pen) 30 MG/ML solution auto-injector Inject  under the skin into the appropriate area as directed. INJECTION EVERY 8 WEEKS   3/24/2025    Chlorhexidine Gluconate Cloth 2 % pads Apply  topically.   More than a month    clopidogrel (PLAVIX) 75 MG tablet Take 1 tablet by mouth Daily.   4/3/2025    mupirocin (BACTROBAN) 2 % nasal ointment into the nostril(s) as directed by provider 2 (Two) Times a Day.   More than a month        Hospital medications:  ceFAZolin, 2,000 mg, Intravenous, Once  sodium chloride, 3 mL, Intravenous, Q12H      lactated ringers, 9 mL/hr, Last Rate: 9 mL/hr (04/10/25 1106)        fentanyl    sodium chloride    Family history:  Family History   Problem Relation Age of Onset    Malig Hyperthermia Neg Hx        Social history:  Social History     Tobacco Use    Smoking status: Former     Current packs/day: 0.00     Types: Cigarettes     Start date: 1960     Quit date: 2010     Years since quitting: 15.2   Vaping Use    Vaping status: Never Used   Substance Use Topics    Alcohol use: Yes     Comment:  SOCIAL    Drug use: Not Currently       Objective:  TMax 24 hours:   Temp (24hrs), Av.9 °F (36.6 °C), Min:97.9 °F (36.6 °C), Max:97.9 °F (36.6 °C)      Vitals Ranges:   Temp:  [97.9 °F (36.6 °C)] 97.9 °F (36.6 °C)  Heart Rate:  [67-86] 81  Resp:  [16] 16  BP: ()/(46-67) 105/67    Intake/Output Last 3 shifts:  No intake/output data recorded.     Physical Exam:    General Appearance:    Alert, cooperative, NAD                   Neuro/Psych:   Orientation intact, mood/affect pleasant       Results review:   I reviewed the patient's new clinical results.    Data review:  Lab Results (last 24 hours)       ** No results found for the last 24 hours. **             Imaging:  Imaging Results (Last 24 Hours)       ** No results found for the last 24 hours. **               Assessment:     Right upper tract urothelial carcinoma    Plan:     Cystoscopy, bladder biopsy    Shayne Clemente Jr., MD  04/10/25  11:32 EDT

## 2025-04-10 NOTE — ANESTHESIA PROCEDURE NOTES
Airway  Reason: elective    Date/Time: 4/10/2025 12:02 PM  Airway not difficult    General Information and Staff    Patient location during procedure: OR  Anesthesiologist: Jose Valle MD  CRNA/CAA: Donna Casey CRNA    Indications and Patient Condition  Indications for airway management: airway protection    Preoxygenated: yes  MILS maintained throughout    Mask difficulty assessment: 0 - not attempted    Final Airway Details    Final airway type: supraglottic airway      Successful airway: LMA  Size: 4   Number of attempts at approach: 1  Assessment: lips, teeth, and gum same as pre-op and atraumatic intubation

## 2025-04-10 NOTE — ANESTHESIA POSTPROCEDURE EVALUATION
Patient: Vijay Foote    Procedure Summary       Date: 04/10/25 Room / Location: [facility] OR 01 / [facility] MAIN OR    Anesthesia Start: 1156 Anesthesia Stop: 1236    Procedure: CYSTOSCOPY WITH BLADDER BIOPSY (Left) Diagnosis:     Surgeons: Shayne Clemente Jr., MD Provider: Jose Valle MD    Anesthesia Type: general ASA Status: 4            Anesthesia Type: general    Vitals  Vitals Value Taken Time   /75 04/10/25 13:00   Temp 36.4 °C (97.6 °F) 04/10/25 13:00   Pulse 82 04/10/25 13:11   Resp 16 04/10/25 13:00   SpO2 100 % 04/10/25 13:11   Vitals shown include unfiled device data.        Post Anesthesia Care and Evaluation    Patient location during evaluation: bedside  Pain management: adequate    Airway patency: patent  Anesthetic complications: No anesthetic complications    Cardiovascular status: acceptable  Respiratory status: acceptable  Hydration status: acceptable    Comments: */75   Pulse 86   Temp 36.4 °C (97.6 °F) (Oral)   Resp 16   SpO2 95%

## 2025-04-10 NOTE — ANESTHESIA PREPROCEDURE EVALUATION
Anesthesia Evaluation     history of anesthetic complications:  prolonged sedation  NPO Solid Status: > 8 hours  NPO Liquid Status: > 2 hours           Airway   Mallampati: II  Neck ROM: full  no difficulty expected  Dental - normal exam     Pulmonary - normal exam   (+) a smoker Former, COPD, asthma,home oxygen (3L/min at all times)  (-) sleep apnea    PE comment: nonlabored  Cardiovascular - normal exam  Exercise tolerance: poor (<4 METS)    ECG reviewed  Rhythm: regular  Rate: normal    (+) pacemaker ICD, hypertension, CAD, cardiac stents (10-12yrs ago per pt's wife) , CHF , hyperlipidemia  (-) valvular problems/murmurs, past MI, dysrhythmias, angina    ROS comment: Presence of Watchman left atrial appendage closure device  Cardiomyopathy--LV EF 30-35%        EKG:  - ABNORMAL ECG -  Atrial fibrillation  ventricular paced beats  Nonspecific  intraventricular conduction delay  ST elevation secondary to IVCD  Prolonged QT interval  When compared with ECG of 11-Aug-2022 22:21:15,  Significant rate increase  Significant repolarization change      Neuro/Psych- negative ROS  (-) seizures, TIA, CVA  GI/Hepatic/Renal/Endo    (+) renal disease (Renal Mass)-, thyroid problem   (-) GERD, liver disease, diabetes    Musculoskeletal (-) negative ROS    Abdominal    Substance History      OB/GYN          Other                      Anesthesia Plan    ASA 4     general     intravenous induction     Anesthetic plan, risks, benefits, and alternatives have been provided, discussed and informed consent has been obtained with: patient.    CODE STATUS:

## 2025-04-10 NOTE — OP NOTE
Operative Report     [facility] OR    Patient: Vijay Foote  Age:      84 y.o.  :     1940  Sex:      male    Medical Record:  [id number]    Date of Operation/Procedure:  4/10/2025    Pre-operative Diagnosis:  Upper tract urothelial carcinoma    Post-operative Diagnosis:  Same    Surgeon:  Bryn    Name of Operation/Procedure:  Procedure(s) and Anesthesia Type:     * CYSTOSCOPY WITH BLADDER BIOPSY - General    Findings/Complications:  No complications.    Erythema over the posterior wall, mostly likely catheter-related changes, but biopsies were sent from this site.    Description of procedure: After informed consent was obtained, the patient was taken to the OR and general anesthesia was induced. He was placed in lithotomy position, prepped and draped in the standard fashion. All pressure points were padded. He received IV antibiotics prior to the procedure. The rigid cystoscope was passed into the bladder. The bladder was then inspected in its entirety with findings as above. Biopsies were taken of erythematous areas over the posterior wall with a cold cup biopsy forcep. All biopsy sites were fulgurated with a bugbee electrode. The bladder was filled and emptied several times and seen to be hemostatic. The patient was then awakened from anesthesia in the OR and taken to the recovery room in stable condition.    Estimated Blood Loss: minimal    Specimens:   Order Name Source Comment Collection Info Order Time   TISSUE PATHOLOGY EXAM Urinary Bladder  Collected By: Shayne Clemente Jr., MD 4/10/2025 12:20 PM     Release to patient   Routine Release            Fluids/Drains: none    Shayne Clemente Jr., MD  4/10/2025  12:23 EDT

## 2025-04-11 LAB
CYTO UR: NORMAL
LAB AP CASE REPORT: NORMAL
PATH REPORT.FINAL DX SPEC: NORMAL
PATH REPORT.GROSS SPEC: NORMAL

## 2025-04-14 ENCOUNTER — READMISSION MANAGEMENT (OUTPATIENT)
Dept: CALL CENTER | Facility: HOSPITAL | Age: 85
End: 2025-04-14
Payer: MEDICARE

## 2025-04-14 NOTE — OUTREACH NOTE
Medical Week 3 Survey      Flowsheet Row Responses   [facility] patient discharged from? [place]   Does the patient have one of the following disease processes/diagnoses(primary or secondary)? Other   Week 3 attempt successful? No   Unsuccessful attempts Attempt 1   Renard Fernandez Registered Nurse

## 2025-04-16 ENCOUNTER — PRE-ADMISSION TESTING (OUTPATIENT)
Dept: PREADMISSION TESTING | Facility: HOSPITAL | Age: 85
End: 2025-04-16
Payer: MEDICARE

## 2025-04-16 VITALS
DIASTOLIC BLOOD PRESSURE: 55 MMHG | SYSTOLIC BLOOD PRESSURE: 104 MMHG | OXYGEN SATURATION: 99 % | TEMPERATURE: 97.1 F | RESPIRATION RATE: 18 BRPM | BODY MASS INDEX: 24.45 KG/M2 | HEART RATE: 85 BPM | HEIGHT: 64 IN | WEIGHT: 143.2 LBS

## 2025-04-16 LAB
ABO GROUP BLD: NORMAL
ANION GAP SERPL CALCULATED.3IONS-SCNC: 8.7 MMOL/L (ref 5–15)
BLD GP AB SCN SERPL QL: NEGATIVE
BUN SERPL-MCNC: 27 MG/DL (ref 8–23)
BUN/CREAT SERPL: 34.6 (ref 7–25)
CALCIUM SPEC-SCNC: 10.6 MG/DL (ref 8.6–10.5)
CHLORIDE SERPL-SCNC: 99 MMOL/L (ref 98–107)
CO2 SERPL-SCNC: 32.3 MMOL/L (ref 22–29)
CREAT SERPL-MCNC: 0.78 MG/DL (ref 0.76–1.27)
DEPRECATED RDW RBC AUTO: 45.5 FL (ref 37–54)
EGFRCR SERPLBLD CKD-EPI 2021: 89 ML/MIN/1.73
ERYTHROCYTE [DISTWIDTH] IN BLOOD BY AUTOMATED COUNT: 14.5 % (ref 12.3–15.4)
GLUCOSE SERPL-MCNC: 123 MG/DL (ref 65–99)
HCT VFR BLD AUTO: 33.6 % (ref 37.5–51)
HGB BLD-MCNC: 10.4 G/DL (ref 13–17.7)
INR PPP: 1.3 (ref 0.9–1.1)
MCH RBC QN AUTO: 26.6 PG (ref 26.6–33)
MCHC RBC AUTO-ENTMCNC: 31 G/DL (ref 31.5–35.7)
MCV RBC AUTO: 85.9 FL (ref 79–97)
PLATELET # BLD AUTO: 170 10*3/MM3 (ref 140–450)
PMV BLD AUTO: 9.1 FL (ref 6–12)
POTASSIUM SERPL-SCNC: 4.3 MMOL/L (ref 3.5–5.2)
PROTHROMBIN TIME: 16.2 SECONDS (ref 11.7–14.2)
QT INTERVAL: 484 MS
QTC INTERVAL: 552 MS
RBC # BLD AUTO: 3.91 10*6/MM3 (ref 4.14–5.8)
RH BLD: NEGATIVE
SODIUM SERPL-SCNC: 140 MMOL/L (ref 136–145)
T&S EXPIRATION DATE: NORMAL
WBC NRBC COR # BLD AUTO: 6.58 10*3/MM3 (ref 3.4–10.8)

## 2025-04-16 PROCEDURE — 85027 COMPLETE CBC AUTOMATED: CPT

## 2025-04-16 PROCEDURE — 85610 PROTHROMBIN TIME: CPT

## 2025-04-16 PROCEDURE — 80048 BASIC METABOLIC PNL TOTAL CA: CPT

## 2025-04-16 PROCEDURE — 86901 BLOOD TYPING SEROLOGIC RH(D): CPT

## 2025-04-16 PROCEDURE — 86900 BLOOD TYPING SEROLOGIC ABO: CPT

## 2025-04-16 PROCEDURE — 86850 RBC ANTIBODY SCREEN: CPT

## 2025-04-16 PROCEDURE — 36415 COLL VENOUS BLD VENIPUNCTURE: CPT

## 2025-04-16 PROCEDURE — 93005 ELECTROCARDIOGRAM TRACING: CPT

## 2025-04-16 RX ORDER — CHLORAL HYDRATE 500 MG
1000 CAPSULE ORAL
COMMUNITY

## 2025-04-16 RX ORDER — METRONIDAZOLE 500 MG/1
250 TABLET ORAL 2 TIMES DAILY PRN
COMMUNITY

## 2025-04-16 RX ORDER — ALBUTEROL SULFATE 90 UG/1
2 INHALANT RESPIRATORY (INHALATION) EVERY 6 HOURS PRN
COMMUNITY
Start: 2025-02-26

## 2025-04-16 RX ORDER — ERGOCALCIFEROL (VITAMIN D2) 10 MCG
400 TABLET ORAL DAILY
COMMUNITY

## 2025-04-16 NOTE — DISCHARGE INSTRUCTIONS
Take the following medications the morning of surgery with a small sip of water:  USE NEBULIZER AM OF SURGERY. LEVOTHYROXINE PEPCID, METOPROLOL, BRING INHALER      If you are on prescription narcotic pain medication to control your pain you may also take that medication the morning of surgery.    General Instructions:  Do not eat or drink anything after midnight the night before surgery.  Infants may have breast milk up to four hours before surgery.  Infants drinking formula may drink formula up to six hours before surgery.   Patients who avoid smoking, chewing tobacco and alcohol for 4 weeks prior to surgery have a reduced risk of post-operative complications.  Quit smoking as many days before surgery as you can.  Do not smoke, use chewing tobacco or drink alcohol the day of surgery.   If applicable bring your C-PAP/ BI-PAP machine in with you to preop day of surgery.  Bring any papers given to you in the doctor’s office.  Wear clean comfortable clothes.  Do not wear contact lenses, false eyelashes or make-up.  Bring a case for your glasses.   Bring crutches or walker if applicable.  Remove all piercings.  Leave jewelry and any other valuables at home.  Hair extensions with metal clips must be removed prior to surgery.  The Pre-Admission Testing nurse will instruct you to bring medications if unable to obtain an accurate list in Pre-Admission Testing.    Day of surgery you will need to let the preoperative nurse know the last time you took each of your medications.  To ensure a safe environment for patients and staff, we kindly ask that children under the age of 16 not accompany patients.  If you must bring a dependent child or dependent adult please ensure a responsible adult, other than yourself, is present to supervise them.      If you were given a blood bank ID arm band remember to bring it with you the day of surgery.    Preventing a Surgical Site Infection:  For 2 to 3 days before surgery, avoid shaving with  a razor because the razor can irritate skin and make it easier to develop an infection.    Any areas of open skin can increase the risk of a post-operative wound infection by allowing bacteria to enter and travel throughout the body.  Notify your surgeon if you have any skin wounds / rashes even if it is not near the expected surgical site.  The area will need assessed to determine if surgery should be delayed until it is healed.  The night prior to surgery shower using a fresh bar of anti-bacterial soap (such as Dial) and clean washcloth.  Sleep in a clean bed with clean clothing.  Do not allow pets to sleep with you.  Shower on the morning of surgery using a fresh bar of anti-bacterial soap (such as Dial) and clean washcloth.  Dry with a clean towel and dress in clean clothing.  Ask your surgeon if you will be receiving antibiotics prior to surgery.  Make sure you, your family, and all healthcare providers clean their hands with soap and water or an alcohol based hand  before caring for you or your wound.    Day of surgery:  Your arrival time is approximately two hours before your scheduled surgery time.  Please note if you have an early arrival time the surgery doors do not open before 5:00 AM.  Upon arrival, a Pre-op nurse and Anesthesiologist will review your health history, obtain vital signs, and answer questions you may have.  The only belongings needed at this time will be your home medications and if applicable your C-PAP/BI-PAP machine.  A Pre-op nurse will start an IV and you may receive medication in preparation for surgery, including something to help you relax.      Please be aware that surgery does come with discomfort.  We want to make every effort to control your discomfort so please discuss any uncontrolled symptoms with your nurse.   Your doctor will most likely have prescribed pain medications.      If you are going home after surgery you will receive individualized written care  instructions before being discharged.  A responsible adult must drive you to and from the hospital on the day of your surgery and ideally stay with you through the night.  Discharge prescriptions can be filled by the hospital pharmacy during regular pharmacy hours.  If you are having surgery late in the day/evening your prescription may be e-prescribed to your pharmacy.  Please verify your pharmacy hours or chose a 24 hour pharmacy to avoid not having access to your prescription because your pharmacy has closed for the day.    If you are staying overnight following surgery, you will be transported to your hospital room following the recovery period.  [facility] has all private rooms.    If you have any questions please call Pre-Admission Testing at [phone].  Deductibles and co-payments are collected on the day of service. Please be prepared to pay the required co-pay, deductible or deposit on the day of service as defined by your plan.    Call your surgeon immediately if you experience any of the following symptoms:  Sore Throat  Shortness of Breath or difficulty breathing  Cough  Chills  Body soreness or muscle pain  Headache  Fever  New loss of taste or smell  Do not arrive for your surgery ill.  Your procedure will need to be rescheduled to another time.  You will need to call your physician before the day of surgery to avoid any unnecessary exposure to hospital staff as well as other patients.

## 2025-04-24 ENCOUNTER — HOSPITAL ENCOUNTER (INPATIENT)
Facility: HOSPITAL | Age: 85
LOS: 3 days | Discharge: HOME HEALTH | DRG: 656 | End: 2025-04-27
Attending: UROLOGY | Admitting: UROLOGY
Payer: MEDICARE

## 2025-04-24 ENCOUNTER — APPOINTMENT (OUTPATIENT)
Dept: GENERAL RADIOLOGY | Facility: HOSPITAL | Age: 85
DRG: 656 | End: 2025-04-24
Payer: MEDICARE

## 2025-04-24 ENCOUNTER — ANESTHESIA (OUTPATIENT)
Dept: PERIOP | Facility: HOSPITAL | Age: 85
End: 2025-04-24
Payer: MEDICARE

## 2025-04-24 ENCOUNTER — ANESTHESIA EVENT (OUTPATIENT)
Dept: PERIOP | Facility: HOSPITAL | Age: 85
End: 2025-04-24
Payer: MEDICARE

## 2025-04-24 DIAGNOSIS — C68.9 UROTHELIAL CARCINOMA: ICD-10-CM

## 2025-04-24 PROBLEM — N28.89 RIGHT RENAL MASS: Status: ACTIVE | Noted: 2025-04-24

## 2025-04-24 LAB
ABO GROUP BLD: NORMAL
ANION GAP SERPL CALCULATED.3IONS-SCNC: 7 MMOL/L (ref 5–15)
BLD GP AB SCN SERPL QL: NEGATIVE
BUN SERPL-MCNC: 24 MG/DL (ref 8–23)
BUN/CREAT SERPL: 22.2 (ref 7–25)
CALCIUM SPEC-SCNC: 9.9 MG/DL (ref 8.6–10.5)
CHLORIDE SERPL-SCNC: 105 MMOL/L (ref 98–107)
CO2 SERPL-SCNC: 27 MMOL/L (ref 22–29)
CREAT SERPL-MCNC: 1.08 MG/DL (ref 0.76–1.27)
DEPRECATED RDW RBC AUTO: 44.1 FL (ref 37–54)
EGFRCR SERPLBLD CKD-EPI 2021: 68.5 ML/MIN/1.73
ERYTHROCYTE [DISTWIDTH] IN BLOOD BY AUTOMATED COUNT: 14.4 % (ref 12.3–15.4)
GLUCOSE SERPL-MCNC: 202 MG/DL (ref 65–99)
HCT VFR BLD AUTO: 32.2 % (ref 37.5–51)
HGB BLD-MCNC: 10.3 G/DL (ref 13–17.7)
MCH RBC QN AUTO: 27.2 PG (ref 26.6–33)
MCHC RBC AUTO-ENTMCNC: 32 G/DL (ref 31.5–35.7)
MCV RBC AUTO: 85 FL (ref 79–97)
PLATELET # BLD AUTO: 203 10*3/MM3 (ref 140–450)
PMV BLD AUTO: 8.4 FL (ref 6–12)
POTASSIUM SERPL-SCNC: 4.6 MMOL/L (ref 3.5–5.2)
RBC # BLD AUTO: 3.79 10*6/MM3 (ref 4.14–5.8)
RH BLD: NEGATIVE
SODIUM SERPL-SCNC: 139 MMOL/L (ref 136–145)
T&S EXPIRATION DATE: NORMAL
WBC NRBC COR # BLD AUTO: 5.34 10*3/MM3 (ref 3.4–10.8)

## 2025-04-24 PROCEDURE — 25010000002 CEFAZOLIN PER 500 MG: Performed by: UROLOGY

## 2025-04-24 PROCEDURE — 25010000002 HYDROMORPHONE PER 4 MG: Performed by: UROLOGY

## 2025-04-24 PROCEDURE — 85027 COMPLETE CBC AUTOMATED: CPT | Performed by: UROLOGY

## 2025-04-24 PROCEDURE — 0TT04ZZ RESECTION OF RIGHT KIDNEY, PERCUTANEOUS ENDOSCOPIC APPROACH: ICD-10-PCS | Performed by: UROLOGY

## 2025-04-24 PROCEDURE — 36430 TRANSFUSION BLD/BLD COMPNT: CPT

## 2025-04-24 PROCEDURE — 25810000003 SODIUM CHLORIDE PER 500 ML: Performed by: UROLOGY

## 2025-04-24 PROCEDURE — 82947 ASSAY GLUCOSE BLOOD QUANT: CPT

## 2025-04-24 PROCEDURE — 25010000003 DEXTROSE 5 % SOLUTION 250 ML FLEX CONT: Performed by: NURSE ANESTHETIST, CERTIFIED REGISTERED

## 2025-04-24 PROCEDURE — 25010000002 ONDANSETRON PER 1 MG: Performed by: NURSE ANESTHETIST, CERTIFIED REGISTERED

## 2025-04-24 PROCEDURE — 86901 BLOOD TYPING SEROLOGIC RH(D): CPT | Performed by: UROLOGY

## 2025-04-24 PROCEDURE — 82803 BLOOD GASES ANY COMBINATION: CPT

## 2025-04-24 PROCEDURE — 80048 BASIC METABOLIC PNL TOTAL CA: CPT | Performed by: UROLOGY

## 2025-04-24 PROCEDURE — 25010000002 HYDROMORPHONE PER 4 MG: Performed by: NURSE ANESTHETIST, CERTIFIED REGISTERED

## 2025-04-24 PROCEDURE — 85014 HEMATOCRIT: CPT

## 2025-04-24 PROCEDURE — 25010000002 ALBUMIN HUMAN 5% PER 50 ML: Performed by: NURSE ANESTHETIST, CERTIFIED REGISTERED

## 2025-04-24 PROCEDURE — P9016 RBC LEUKOCYTES REDUCED: HCPCS

## 2025-04-24 PROCEDURE — 25810000003 SODIUM CHLORIDE 0.9 % SOLUTION: Performed by: UROLOGY

## 2025-04-24 PROCEDURE — 25010000002 CEFAZOLIN PER 500 MG: Performed by: NURSE ANESTHETIST, CERTIFIED REGISTERED

## 2025-04-24 PROCEDURE — 25010000002 FAMOTIDINE 10 MG/ML SOLUTION: Performed by: ANESTHESIOLOGY

## 2025-04-24 PROCEDURE — 85018 HEMOGLOBIN: CPT

## 2025-04-24 PROCEDURE — 86850 RBC ANTIBODY SCREEN: CPT | Performed by: UROLOGY

## 2025-04-24 PROCEDURE — 86900 BLOOD TYPING SEROLOGIC ABO: CPT

## 2025-04-24 PROCEDURE — 86900 BLOOD TYPING SEROLOGIC ABO: CPT | Performed by: UROLOGY

## 2025-04-24 PROCEDURE — 25010000002 LIDOCAINE 2% SOLUTION: Performed by: NURSE ANESTHETIST, CERTIFIED REGISTERED

## 2025-04-24 PROCEDURE — 8E0W4CZ ROBOTIC ASSISTED PROCEDURE OF TRUNK REGION, PERCUTANEOUS ENDOSCOPIC APPROACH: ICD-10-PCS | Performed by: UROLOGY

## 2025-04-24 PROCEDURE — 88307 TISSUE EXAM BY PATHOLOGIST: CPT | Performed by: UROLOGY

## 2025-04-24 PROCEDURE — 86923 COMPATIBILITY TEST ELECTRIC: CPT

## 2025-04-24 PROCEDURE — P9041 ALBUMIN (HUMAN),5%, 50ML: HCPCS | Performed by: NURSE ANESTHETIST, CERTIFIED REGISTERED

## 2025-04-24 PROCEDURE — 88342 IMHCHEM/IMCYTCHM 1ST ANTB: CPT | Performed by: UROLOGY

## 2025-04-24 PROCEDURE — 25010000002 DEXAMETHASONE SODIUM PHOSPHATE 20 MG/5ML SOLUTION: Performed by: NURSE ANESTHETIST, CERTIFIED REGISTERED

## 2025-04-24 PROCEDURE — 25010000002 FENTANYL CITRATE (PF) 50 MCG/ML SOLUTION: Performed by: ANESTHESIOLOGY

## 2025-04-24 PROCEDURE — 0TB64ZZ EXCISION OF RIGHT URETER, PERCUTANEOUS ENDOSCOPIC APPROACH: ICD-10-PCS | Performed by: UROLOGY

## 2025-04-24 PROCEDURE — 25810000003 LACTATED RINGERS PER 1000 ML: Performed by: ANESTHESIOLOGY

## 2025-04-24 PROCEDURE — 25010000002 LIDOCAINE 1 % SOLUTION: Performed by: UROLOGY

## 2025-04-24 PROCEDURE — 25010000002 FENTANYL CITRATE (PF) 50 MCG/ML SOLUTION: Performed by: NURSE ANESTHETIST, CERTIFIED REGISTERED

## 2025-04-24 PROCEDURE — 88341 IMHCHEM/IMCYTCHM EA ADD ANTB: CPT | Performed by: UROLOGY

## 2025-04-24 PROCEDURE — 25010000002 SUGAMMADEX 200 MG/2ML SOLUTION: Performed by: NURSE ANESTHETIST, CERTIFIED REGISTERED

## 2025-04-24 PROCEDURE — 25010000002 PROPOFOL 10 MG/ML EMULSION: Performed by: NURSE ANESTHETIST, CERTIFIED REGISTERED

## 2025-04-24 DEVICE — ABSORBABLE HEMOSTAT (OXIDIZED REGENERATED CELLULOSE)
Type: IMPLANTABLE DEVICE | Site: ABDOMEN | Status: FUNCTIONAL
Brand: SURGICEL NU-KNIT

## 2025-04-24 DEVICE — KNOTLESS TISSUE CONTROL DEVICE, UNDYED UNIDIRECTIONAL (ANTIBACTERIAL) SYNTHETIC ABSORBABLE DEVICE
Type: IMPLANTABLE DEVICE | Site: ABDOMEN | Status: FUNCTIONAL
Brand: STRATAFIX

## 2025-04-24 DEVICE — HORIZON TI ML 6 CLIPS/CART
Type: IMPLANTABLE DEVICE | Site: ABDOMEN | Status: FUNCTIONAL
Brand: WECK

## 2025-04-24 DEVICE — HEMOLOK L 6 CLIPS/CART
Type: IMPLANTABLE DEVICE | Site: ABDOMEN | Status: FUNCTIONAL
Brand: WECK

## 2025-04-24 DEVICE — THE ECHELON, ECHELON ENDOPATH™ AND ECHELON FLEX™ FAMILIES OF ENDOSCOPIC LINEAR CUTTERS AND RELOADS ARE STERILE, SINGLE PATIENT USE INSTRUMENTS THAT SIMULTANEOUSLY CUT AND STAPLE TISSUE. THERE ARE SIX STAGGERED ROWS OF STAPLES, THREE ON EITHER SIDE OF THE CUT LINE. THE 45 MM INSTRUMENTS HAVE A STAPLE LINE THATIS APPROXIMATELY 45 MM LONG AND A CUT LINE THAT IS APPROXIMATELY 42 MM LONG. THE SHAFT CAN ROTATE FREELY IN BOTH DIRECTIONS AND AN ARTICULATION MECHANISM ON ARTICULATING INSTRUMENTS ENABLES BENDING THE DISTAL PORTIONOF THE SHAFT TO FACILITATE LATERAL ACCESS OF THE OPERATIVE SITE.THE INSTRUMENTS ARE SHIPPED WITHOUT A RELOAD AND MUST BE LOADED PRIOR TO USE. A STAPLE RETAINING CAP ON THE RELOAD PROTECTS THE STAPLE LEG POINTS DURING SHIPPING AND TRANSPORTATION. THE INSTRUMENTS’ LOCK-OUT FEATURE IS DESIGNED TO PREVENT A USED RELOAD FROM BEING REFIRED.
Type: IMPLANTABLE DEVICE | Site: ABDOMEN | Status: FUNCTIONAL
Brand: ECHELON ENDOPATH

## 2025-04-24 RX ORDER — ONDANSETRON 2 MG/ML
INJECTION INTRAMUSCULAR; INTRAVENOUS AS NEEDED
Status: DISCONTINUED | OUTPATIENT
Start: 2025-04-24 | End: 2025-04-24 | Stop reason: SURG

## 2025-04-24 RX ORDER — LEVOTHYROXINE SODIUM 125 UG/1
125 TABLET ORAL
Status: DISCONTINUED | OUTPATIENT
Start: 2025-04-25 | End: 2025-04-27 | Stop reason: HOSPADM

## 2025-04-24 RX ORDER — FAMOTIDINE 10 MG/ML
20 INJECTION, SOLUTION INTRAVENOUS ONCE
Status: COMPLETED | OUTPATIENT
Start: 2025-04-24 | End: 2025-04-24

## 2025-04-24 RX ORDER — CALCIUM CHLORIDE 100 MG/ML
INJECTION INTRAVENOUS; INTRAVENTRICULAR AS NEEDED
Status: DISCONTINUED | OUTPATIENT
Start: 2025-04-24 | End: 2025-04-24 | Stop reason: SURG

## 2025-04-24 RX ORDER — SODIUM CHLORIDE 9 MG/ML
75 INJECTION, SOLUTION INTRAVENOUS CONTINUOUS
Status: DISCONTINUED | OUTPATIENT
Start: 2025-04-24 | End: 2025-04-26

## 2025-04-24 RX ORDER — FENTANYL CITRATE 50 UG/ML
INJECTION, SOLUTION INTRAMUSCULAR; INTRAVENOUS
Status: COMPLETED | OUTPATIENT
Start: 2025-04-24 | End: 2025-04-24

## 2025-04-24 RX ORDER — ONDANSETRON 4 MG/1
4 TABLET, ORALLY DISINTEGRATING ORAL EVERY 6 HOURS PRN
Status: DISCONTINUED | OUTPATIENT
Start: 2025-04-24 | End: 2025-04-27 | Stop reason: HOSPADM

## 2025-04-24 RX ORDER — FENTANYL CITRATE 50 UG/ML
25 INJECTION, SOLUTION INTRAMUSCULAR; INTRAVENOUS
Status: DISCONTINUED | OUTPATIENT
Start: 2025-04-24 | End: 2025-04-24 | Stop reason: HOSPADM

## 2025-04-24 RX ORDER — HYDROMORPHONE HYDROCHLORIDE 1 MG/ML
0.25 INJECTION, SOLUTION INTRAMUSCULAR; INTRAVENOUS; SUBCUTANEOUS
Status: DISCONTINUED | OUTPATIENT
Start: 2025-04-24 | End: 2025-04-24 | Stop reason: HOSPADM

## 2025-04-24 RX ORDER — FUROSEMIDE 40 MG/1
40 TABLET ORAL DAILY
Status: DISCONTINUED | OUTPATIENT
Start: 2025-04-24 | End: 2025-04-27 | Stop reason: HOSPADM

## 2025-04-24 RX ORDER — KETAMINE HCL IN NACL, ISO-OSM 100MG/10ML
SYRINGE (ML) INJECTION AS NEEDED
Status: DISCONTINUED | OUTPATIENT
Start: 2025-04-24 | End: 2025-04-24 | Stop reason: SURG

## 2025-04-24 RX ORDER — BISACODYL 10 MG
10 SUPPOSITORY, RECTAL RECTAL DAILY
Status: DISCONTINUED | OUTPATIENT
Start: 2025-04-24 | End: 2025-04-27 | Stop reason: HOSPADM

## 2025-04-24 RX ORDER — AMOXICILLIN 250 MG
2 CAPSULE ORAL 2 TIMES DAILY
Status: DISCONTINUED | OUTPATIENT
Start: 2025-04-24 | End: 2025-04-27 | Stop reason: HOSPADM

## 2025-04-24 RX ORDER — METOPROLOL TARTRATE 25 MG/1
25 TABLET, FILM COATED ORAL 2 TIMES DAILY
Status: DISCONTINUED | OUTPATIENT
Start: 2025-04-24 | End: 2025-04-27 | Stop reason: HOSPADM

## 2025-04-24 RX ORDER — BUDESONIDE 1 MG/2ML
1 INHALANT ORAL
Status: DISCONTINUED | OUTPATIENT
Start: 2025-04-24 | End: 2025-04-27 | Stop reason: HOSPADM

## 2025-04-24 RX ORDER — HYDRALAZINE HYDROCHLORIDE 20 MG/ML
5 INJECTION INTRAMUSCULAR; INTRAVENOUS
Status: DISCONTINUED | OUTPATIENT
Start: 2025-04-24 | End: 2025-04-24 | Stop reason: HOSPADM

## 2025-04-24 RX ORDER — LIDOCAINE HYDROCHLORIDE 10 MG/ML
0.5 INJECTION, SOLUTION INFILTRATION; PERINEURAL ONCE AS NEEDED
Status: DISCONTINUED | OUTPATIENT
Start: 2025-04-24 | End: 2025-04-24 | Stop reason: SDUPTHER

## 2025-04-24 RX ORDER — SODIUM CHLORIDE, SODIUM LACTATE, POTASSIUM CHLORIDE, CALCIUM CHLORIDE 600; 310; 30; 20 MG/100ML; MG/100ML; MG/100ML; MG/100ML
9 INJECTION, SOLUTION INTRAVENOUS CONTINUOUS
Status: DISCONTINUED | OUTPATIENT
Start: 2025-04-24 | End: 2025-04-24

## 2025-04-24 RX ORDER — MIDAZOLAM HYDROCHLORIDE 1 MG/ML
0.5 INJECTION, SOLUTION INTRAMUSCULAR; INTRAVENOUS
Status: DISCONTINUED | OUTPATIENT
Start: 2025-04-24 | End: 2025-04-24 | Stop reason: SDUPTHER

## 2025-04-24 RX ORDER — ONDANSETRON 2 MG/ML
4 INJECTION INTRAMUSCULAR; INTRAVENOUS ONCE AS NEEDED
Status: DISCONTINUED | OUTPATIENT
Start: 2025-04-24 | End: 2025-04-24 | Stop reason: HOSPADM

## 2025-04-24 RX ORDER — DROPERIDOL 2.5 MG/ML
0.62 INJECTION, SOLUTION INTRAMUSCULAR; INTRAVENOUS
Status: DISCONTINUED | OUTPATIENT
Start: 2025-04-24 | End: 2025-04-24 | Stop reason: HOSPADM

## 2025-04-24 RX ORDER — DIPHENHYDRAMINE HYDROCHLORIDE 50 MG/ML
12.5 INJECTION, SOLUTION INTRAMUSCULAR; INTRAVENOUS
Status: DISCONTINUED | OUTPATIENT
Start: 2025-04-24 | End: 2025-04-24 | Stop reason: HOSPADM

## 2025-04-24 RX ORDER — BUPIVACAINE HCL/0.9 % NACL/PF 0.125 %
PLASTIC BAG, INJECTION (ML) EPIDURAL AS NEEDED
Status: DISCONTINUED | OUTPATIENT
Start: 2025-04-24 | End: 2025-04-24 | Stop reason: SURG

## 2025-04-24 RX ORDER — FLUMAZENIL 0.1 MG/ML
0.2 INJECTION INTRAVENOUS AS NEEDED
Status: DISCONTINUED | OUTPATIENT
Start: 2025-04-24 | End: 2025-04-24 | Stop reason: HOSPADM

## 2025-04-24 RX ORDER — EPHEDRINE SULFATE 50 MG/ML
INJECTION INTRAVENOUS AS NEEDED
Status: DISCONTINUED | OUTPATIENT
Start: 2025-04-24 | End: 2025-04-24 | Stop reason: SURG

## 2025-04-24 RX ORDER — FENTANYL CITRATE 50 UG/ML
50 INJECTION, SOLUTION INTRAMUSCULAR; INTRAVENOUS ONCE AS NEEDED
Status: DISCONTINUED | OUTPATIENT
Start: 2025-04-24 | End: 2025-04-24 | Stop reason: SDUPTHER

## 2025-04-24 RX ORDER — SODIUM CHLORIDE 0.9 % (FLUSH) 0.9 %
3-10 SYRINGE (ML) INJECTION AS NEEDED
Status: DISCONTINUED | OUTPATIENT
Start: 2025-04-24 | End: 2025-04-24 | Stop reason: HOSPADM

## 2025-04-24 RX ORDER — PROMETHAZINE HYDROCHLORIDE 25 MG/1
25 SUPPOSITORY RECTAL ONCE AS NEEDED
Status: DISCONTINUED | OUTPATIENT
Start: 2025-04-24 | End: 2025-04-24 | Stop reason: HOSPADM

## 2025-04-24 RX ORDER — PROPOFOL 10 MG/ML
VIAL (ML) INTRAVENOUS AS NEEDED
Status: DISCONTINUED | OUTPATIENT
Start: 2025-04-24 | End: 2025-04-24 | Stop reason: SURG

## 2025-04-24 RX ORDER — HYDROCODONE BITARTRATE AND ACETAMINOPHEN 7.5; 325 MG/1; MG/1
1 TABLET ORAL EVERY 4 HOURS PRN
Status: DISCONTINUED | OUTPATIENT
Start: 2025-04-24 | End: 2025-04-24 | Stop reason: HOSPADM

## 2025-04-24 RX ORDER — FAMOTIDINE 20 MG/1
20 TABLET, FILM COATED ORAL
Status: DISCONTINUED | OUTPATIENT
Start: 2025-04-24 | End: 2025-04-27

## 2025-04-24 RX ORDER — SODIUM CHLORIDE 0.9 % (FLUSH) 0.9 %
3 SYRINGE (ML) INJECTION EVERY 12 HOURS SCHEDULED
Status: DISCONTINUED | OUTPATIENT
Start: 2025-04-24 | End: 2025-04-24 | Stop reason: HOSPADM

## 2025-04-24 RX ORDER — EPHEDRINE SULFATE 50 MG/ML
5 INJECTION, SOLUTION INTRAVENOUS ONCE AS NEEDED
Status: DISCONTINUED | OUTPATIENT
Start: 2025-04-24 | End: 2025-04-24 | Stop reason: HOSPADM

## 2025-04-24 RX ORDER — ALBUMIN HUMAN 50 G/1000ML
SOLUTION INTRAVENOUS CONTINUOUS PRN
Status: DISCONTINUED | OUTPATIENT
Start: 2025-04-24 | End: 2025-04-24 | Stop reason: SURG

## 2025-04-24 RX ORDER — FAMOTIDINE 10 MG/ML
20 INJECTION, SOLUTION INTRAVENOUS ONCE
Status: DISCONTINUED | OUTPATIENT
Start: 2025-04-24 | End: 2025-04-24 | Stop reason: SDUPTHER

## 2025-04-24 RX ORDER — OXYCODONE AND ACETAMINOPHEN 5; 325 MG/1; MG/1
1 TABLET ORAL EVERY 4 HOURS PRN
Status: DISCONTINUED | OUTPATIENT
Start: 2025-04-24 | End: 2025-04-27 | Stop reason: HOSPADM

## 2025-04-24 RX ORDER — ATROPINE SULFATE 0.4 MG/ML
0.4 INJECTION, SOLUTION INTRAMUSCULAR; INTRAVENOUS; SUBCUTANEOUS ONCE AS NEEDED
Status: DISCONTINUED | OUTPATIENT
Start: 2025-04-24 | End: 2025-04-24 | Stop reason: HOSPADM

## 2025-04-24 RX ORDER — LIDOCAINE HYDROCHLORIDE 20 MG/ML
INJECTION, SOLUTION INFILTRATION; PERINEURAL AS NEEDED
Status: DISCONTINUED | OUTPATIENT
Start: 2025-04-24 | End: 2025-04-24 | Stop reason: SURG

## 2025-04-24 RX ORDER — LIDOCAINE HYDROCHLORIDE 10 MG/ML
0.5 INJECTION, SOLUTION INFILTRATION; PERINEURAL ONCE AS NEEDED
Status: DISCONTINUED | OUTPATIENT
Start: 2025-04-24 | End: 2025-04-24 | Stop reason: HOSPADM

## 2025-04-24 RX ORDER — ALBUTEROL SULFATE 90 UG/1
2 INHALANT RESPIRATORY (INHALATION) EVERY 6 HOURS PRN
Status: DISCONTINUED | OUTPATIENT
Start: 2025-04-24 | End: 2025-04-27 | Stop reason: HOSPADM

## 2025-04-24 RX ORDER — HYDROMORPHONE HYDROCHLORIDE 1 MG/ML
0.5 INJECTION, SOLUTION INTRAMUSCULAR; INTRAVENOUS; SUBCUTANEOUS
Status: DISCONTINUED | OUTPATIENT
Start: 2025-04-24 | End: 2025-04-27 | Stop reason: HOSPADM

## 2025-04-24 RX ORDER — ROCURONIUM BROMIDE 10 MG/ML
INJECTION, SOLUTION INTRAVENOUS AS NEEDED
Status: DISCONTINUED | OUTPATIENT
Start: 2025-04-24 | End: 2025-04-24 | Stop reason: SURG

## 2025-04-24 RX ORDER — HYDROCODONE BITARTRATE AND ACETAMINOPHEN 5; 325 MG/1; MG/1
1 TABLET ORAL EVERY 6 HOURS PRN
Status: DISCONTINUED | OUTPATIENT
Start: 2025-04-24 | End: 2025-04-27 | Stop reason: SDUPTHER

## 2025-04-24 RX ORDER — LIDOCAINE HYDROCHLORIDE 10 MG/ML
INJECTION, SOLUTION INFILTRATION; PERINEURAL AS NEEDED
Status: DISCONTINUED | OUTPATIENT
Start: 2025-04-24 | End: 2025-04-24 | Stop reason: HOSPADM

## 2025-04-24 RX ORDER — ROSUVASTATIN CALCIUM 40 MG/1
40 TABLET, COATED ORAL NIGHTLY
Status: DISCONTINUED | OUTPATIENT
Start: 2025-04-24 | End: 2025-04-27 | Stop reason: HOSPADM

## 2025-04-24 RX ORDER — PROMETHAZINE HYDROCHLORIDE 25 MG/1
25 TABLET ORAL ONCE AS NEEDED
Status: DISCONTINUED | OUTPATIENT
Start: 2025-04-24 | End: 2025-04-24 | Stop reason: HOSPADM

## 2025-04-24 RX ORDER — FENTANYL CITRATE 50 UG/ML
50 INJECTION, SOLUTION INTRAMUSCULAR; INTRAVENOUS ONCE AS NEEDED
Status: DISCONTINUED | OUTPATIENT
Start: 2025-04-24 | End: 2025-04-24 | Stop reason: HOSPADM

## 2025-04-24 RX ORDER — MAGNESIUM HYDROXIDE 1200 MG/15ML
LIQUID ORAL AS NEEDED
Status: DISCONTINUED | OUTPATIENT
Start: 2025-04-24 | End: 2025-04-24 | Stop reason: HOSPADM

## 2025-04-24 RX ORDER — ARFORMOTEROL TARTRATE 15 UG/2ML
15 SOLUTION RESPIRATORY (INHALATION)
Status: DISCONTINUED | OUTPATIENT
Start: 2025-04-24 | End: 2025-04-27 | Stop reason: HOSPADM

## 2025-04-24 RX ORDER — NALOXONE HCL 0.4 MG/ML
0.1 VIAL (ML) INJECTION
Status: DISCONTINUED | OUTPATIENT
Start: 2025-04-24 | End: 2025-04-27 | Stop reason: HOSPADM

## 2025-04-24 RX ORDER — NALOXONE HCL 0.4 MG/ML
0.2 VIAL (ML) INJECTION AS NEEDED
Status: DISCONTINUED | OUTPATIENT
Start: 2025-04-24 | End: 2025-04-24 | Stop reason: HOSPADM

## 2025-04-24 RX ORDER — IPRATROPIUM BROMIDE AND ALBUTEROL SULFATE 2.5; .5 MG/3ML; MG/3ML
3 SOLUTION RESPIRATORY (INHALATION) ONCE AS NEEDED
Status: DISCONTINUED | OUTPATIENT
Start: 2025-04-24 | End: 2025-04-24 | Stop reason: HOSPADM

## 2025-04-24 RX ORDER — FENTANYL CITRATE 50 UG/ML
INJECTION, SOLUTION INTRAMUSCULAR; INTRAVENOUS AS NEEDED
Status: DISCONTINUED | OUTPATIENT
Start: 2025-04-24 | End: 2025-04-24 | Stop reason: SURG

## 2025-04-24 RX ORDER — HYDROCODONE BITARTRATE AND ACETAMINOPHEN 5; 325 MG/1; MG/1
1 TABLET ORAL ONCE AS NEEDED
Status: DISCONTINUED | OUTPATIENT
Start: 2025-04-24 | End: 2025-04-24 | Stop reason: HOSPADM

## 2025-04-24 RX ORDER — LABETALOL HYDROCHLORIDE 5 MG/ML
5 INJECTION, SOLUTION INTRAVENOUS
Status: DISCONTINUED | OUTPATIENT
Start: 2025-04-24 | End: 2025-04-24 | Stop reason: HOSPADM

## 2025-04-24 RX ORDER — DEXAMETHASONE SODIUM PHOSPHATE 4 MG/ML
INJECTION, SOLUTION INTRA-ARTICULAR; INTRALESIONAL; INTRAMUSCULAR; INTRAVENOUS; SOFT TISSUE AS NEEDED
Status: DISCONTINUED | OUTPATIENT
Start: 2025-04-24 | End: 2025-04-24 | Stop reason: SURG

## 2025-04-24 RX ORDER — MIDAZOLAM HYDROCHLORIDE 1 MG/ML
0.5 INJECTION, SOLUTION INTRAMUSCULAR; INTRAVENOUS
Status: DISCONTINUED | OUTPATIENT
Start: 2025-04-24 | End: 2025-04-24 | Stop reason: HOSPADM

## 2025-04-24 RX ORDER — SODIUM CHLORIDE 9 MG/ML
INJECTION, SOLUTION INTRAVENOUS AS NEEDED
Status: DISCONTINUED | OUTPATIENT
Start: 2025-04-24 | End: 2025-04-24 | Stop reason: HOSPADM

## 2025-04-24 RX ORDER — ONDANSETRON 2 MG/ML
4 INJECTION INTRAMUSCULAR; INTRAVENOUS EVERY 6 HOURS PRN
Status: DISCONTINUED | OUTPATIENT
Start: 2025-04-24 | End: 2025-04-27 | Stop reason: HOSPADM

## 2025-04-24 RX ADMIN — ALBUMIN (HUMAN): 12.5 INJECTION, SOLUTION INTRAVENOUS at 09:35

## 2025-04-24 RX ADMIN — CALCIUM CHLORIDE 200 MG: 100 INJECTION INTRAVENOUS; INTRAVENTRICULAR at 10:14

## 2025-04-24 RX ADMIN — CEFAZOLIN 2 G: 2 INJECTION, POWDER, LYOPHILIZED, FOR SOLUTION INTRAVENOUS at 11:01

## 2025-04-24 RX ADMIN — ROCURONIUM BROMIDE 20 MG: 10 INJECTION, SOLUTION INTRAVENOUS at 08:17

## 2025-04-24 RX ADMIN — HYDROMORPHONE HYDROCHLORIDE 0.5 MG: 1 INJECTION, SOLUTION INTRAMUSCULAR; INTRAVENOUS; SUBCUTANEOUS at 19:34

## 2025-04-24 RX ADMIN — FUROSEMIDE 40 MG: 40 TABLET ORAL at 17:18

## 2025-04-24 RX ADMIN — FAMOTIDINE 20 MG: 10 INJECTION, SOLUTION INTRAVENOUS at 06:55

## 2025-04-24 RX ADMIN — SODIUM CHLORIDE 75 ML/HR: 9 INJECTION, SOLUTION INTRAVENOUS at 17:18

## 2025-04-24 RX ADMIN — ALBUMIN (HUMAN): 12.5 INJECTION, SOLUTION INTRAVENOUS at 07:24

## 2025-04-24 RX ADMIN — Medication 100 MCG: at 10:25

## 2025-04-24 RX ADMIN — METOPROLOL TARTRATE 25 MG: 25 TABLET, FILM COATED ORAL at 20:41

## 2025-04-24 RX ADMIN — FAMOTIDINE 20 MG: 20 TABLET, FILM COATED ORAL at 17:18

## 2025-04-24 RX ADMIN — EPHEDRINE SULFATE 10 MG: 50 INJECTION INTRAVENOUS at 07:20

## 2025-04-24 RX ADMIN — OXYCODONE AND ACETAMINOPHEN 1 TABLET: 5; 325 TABLET ORAL at 17:18

## 2025-04-24 RX ADMIN — FENTANYL CITRATE 50 MCG: 50 INJECTION, SOLUTION INTRAMUSCULAR; INTRAVENOUS at 07:16

## 2025-04-24 RX ADMIN — SODIUM CHLORIDE, POTASSIUM CHLORIDE, SODIUM LACTATE AND CALCIUM CHLORIDE 9 ML/HR: 600; 310; 30; 20 INJECTION, SOLUTION INTRAVENOUS at 06:10

## 2025-04-24 RX ADMIN — LIDOCAINE HYDROCHLORIDE 60 MG: 20 INJECTION, SOLUTION INFILTRATION; PERINEURAL at 07:16

## 2025-04-24 RX ADMIN — Medication 20 MG: at 08:16

## 2025-04-24 RX ADMIN — SUGAMMADEX 200 MG: 100 INJECTION, SOLUTION INTRAVENOUS at 11:28

## 2025-04-24 RX ADMIN — FENTANYL CITRATE 50 MCG: 50 INJECTION, SOLUTION INTRAMUSCULAR; INTRAVENOUS at 07:43

## 2025-04-24 RX ADMIN — ONDANSETRON 4 MG: 2 INJECTION, SOLUTION INTRAMUSCULAR; INTRAVENOUS at 10:08

## 2025-04-24 RX ADMIN — Medication 100 MCG: at 07:17

## 2025-04-24 RX ADMIN — FENTANYL CITRATE 25 MCG: 50 INJECTION, SOLUTION INTRAMUSCULAR; INTRAVENOUS at 14:27

## 2025-04-24 RX ADMIN — FENTANYL CITRATE 25 MCG: 50 INJECTION, SOLUTION INTRAMUSCULAR; INTRAVENOUS at 06:40

## 2025-04-24 RX ADMIN — CALCIUM CHLORIDE 200 MG: 100 INJECTION INTRAVENOUS; INTRAVENTRICULAR at 07:23

## 2025-04-24 RX ADMIN — HYDROMORPHONE HYDROCHLORIDE 0.25 MG: 1 INJECTION, SOLUTION INTRAMUSCULAR; INTRAVENOUS; SUBCUTANEOUS at 13:51

## 2025-04-24 RX ADMIN — CALCIUM CHLORIDE 200 MG: 100 INJECTION INTRAVENOUS; INTRAVENTRICULAR at 07:21

## 2025-04-24 RX ADMIN — FENTANYL CITRATE 50 MCG: 50 INJECTION, SOLUTION INTRAMUSCULAR; INTRAVENOUS at 11:34

## 2025-04-24 RX ADMIN — FENTANYL CITRATE 25 MCG: 50 INJECTION, SOLUTION INTRAMUSCULAR; INTRAVENOUS at 14:07

## 2025-04-24 RX ADMIN — NOREPINEPHRINE BITARTRATE 0.02 MCG/KG/MIN: 1 SOLUTION INTRAVENOUS at 07:25

## 2025-04-24 RX ADMIN — ROCURONIUM BROMIDE 60 MG: 10 INJECTION, SOLUTION INTRAVENOUS at 07:16

## 2025-04-24 RX ADMIN — PROPOFOL 120 MG: 10 INJECTION, EMULSION INTRAVENOUS at 07:16

## 2025-04-24 RX ADMIN — DEXAMETHASONE SODIUM PHOSPHATE 8 MG: 4 INJECTION, SOLUTION INTRAMUSCULAR; INTRAVENOUS at 07:16

## 2025-04-24 RX ADMIN — FENTANYL CITRATE 50 MCG: 50 INJECTION, SOLUTION INTRAMUSCULAR; INTRAVENOUS at 10:08

## 2025-04-24 RX ADMIN — EPHEDRINE SULFATE 5 MG: 50 INJECTION INTRAVENOUS at 10:13

## 2025-04-24 RX ADMIN — ROSUVASTATIN 40 MG: 40 TABLET, FILM COATED ORAL at 20:41

## 2025-04-24 RX ADMIN — SODIUM CHLORIDE, POTASSIUM CHLORIDE, SODIUM LACTATE AND CALCIUM CHLORIDE 9 ML/HR: 600; 310; 30; 20 INJECTION, SOLUTION INTRAVENOUS at 06:05

## 2025-04-24 RX ADMIN — CEFAZOLIN 2000 MG: 2 INJECTION, POWDER, FOR SOLUTION INTRAMUSCULAR; INTRAVENOUS at 07:01

## 2025-04-24 RX ADMIN — ROCURONIUM BROMIDE 20 MG: 10 INJECTION, SOLUTION INTRAVENOUS at 09:00

## 2025-04-24 RX ADMIN — ROCURONIUM BROMIDE 10 MG: 10 INJECTION, SOLUTION INTRAVENOUS at 07:47

## 2025-04-24 NOTE — PLAN OF CARE
Goal Outcome Evaluation:      Patient an admit to the floor from surgery. A/Ox4, wife at the bedside. KANA drain, rocha catheter in place. Abdominal dressings intact, wearing abdominal binder. Tolerating clear liquids. SCDs on. Continuous IV fluids. 3L oxygen nasal canula.

## 2025-04-24 NOTE — H&P
FIRST UROLOGY CONSULT      Patient Identification:  NAME:  Vijay Foote  Age:  84 y.o.   Sex:  male   :  1940   MRN:  [id number]     Chief complaint: Right renal mass    History of present illness:      This is a 84 year old man with right upper tract urothelial carcinoma who presents for robotic assisted right nephroureterectomy. We discussed the risks of the procedure including bleeding, infection, damage to surrounding structures, pain, need to perform an open procedure, recurrence of disease, possible benign pathology, loss of kidney function/need for dialysis, thromboembolism, MI, stroke, coma, death. The patient understands these risks and would like to proceed.    Past medical history:  Past Medical History:   Diagnosis Date    Arthritis     OSTEOARTHRITIS    Asthma     Atrial fibrillation     Cancer     UROTHELIAL CANCER    Cardiomyopathy     Chronic heart failure     COPD (chronic obstructive pulmonary disease)     Coronary artery disease     Disease of thyroid gland     Emphysema/COPD     GERD (gastroesophageal reflux disease)     H/O heart artery stent     2 STENTS    Hard to intubate     ON CHART HEADER, PT WAS  UNAWARE    Hematuria     History of transfusion     Hyperlipidemia     Hypertension     ICD (implantable cardioverter-defibrillator) in place     Kidney stone     LBBB (left bundle branch block)     On home O2     3L    Presence of Watchman left atrial appendage closure device     Weight loss     40 POUNDS IN LAST YEAR       Past surgical history:  Past Surgical History:   Procedure Laterality Date    CARDIAC CATHETERIZATION      CARDIAC DEFIBRILLATOR PLACEMENT      CORONARY STENT PLACEMENT      CYSTOSCOPY N/A 2025    Procedure: CYSTOSCOPY, clot evacuation, catheter insertion;  Surgeon: Chance Reyes MD;  Location: [facility];  Service: Urology;  Laterality: N/A;    CYSTOSCOPY BLADDER BIOPSY Left 04/10/2025    Procedure: CYSTOSCOPY WITH BLADDER BIOPSY;  Surgeon:  Shayne Clemente Jr., MD;  Location: [facility] MAIN OR;  Service: Urology;  Laterality: Left;    MASTOIDECTOMY Right     ORIF ANKLE FRACTURE Right     ROTATOR CUFF REPAIR Left     TONSILLECTOMY      TOTAL SHOULDER ARTHROPLASTY W/ DISTAL CLAVICLE EXCISION Right 08/11/2022    Procedure: RIGHT REVERSE TOTAL SHOULDER ARTHROPLASTY;  Surgeon: Wali Orellana MD;  Location: [facility] OR [facility];  Service: Orthopedics;  Laterality: Right;    TRIGGER FINGER RELEASE Left        Allergies:  Patient has no known allergies.    Home medications:  Medications Prior to Admission   Medication Sig Dispense Refill Last Dose/Taking    albuterol sulfate HFA (Ventolin HFA) 108 (90 Base) MCG/ACT inhaler Inhale 2 puffs Every 6 (Six) Hours As Needed.       arformoterol (BROVANA) 15 MCG/2ML nebulizer solution Take 2 mL by nebulization 2 (Two) Times a Day. 120 mL 0     Benralizumab (Fasenra Pen) 30 MG/ML solution auto-injector Inject  under the skin into the appropriate area as directed. INJECTION EVERY 8 WEEKS       budesonide (PULMICORT) 1 MG/2ML nebulizer solution Take 2 mL by nebulization 2 (Two) Times a Day.       clopidogrel (PLAVIX) 75 MG tablet Take 1 tablet by mouth Daily. (Patient taking differently: Take 1 tablet by mouth Daily. PT STATES HOLDING FOR 7 DAYS PRIOR TO SURGERY)       famotidine (PEPCID) 20 MG tablet Take 1 tablet by mouth 2 (Two) Times a Day Before Meals. 60 tablet 0     furosemide (LASIX) 40 MG tablet Take 1 tablet by mouth Daily.       Glucosamine-Chondroit-Vit C-Mn (Glucosamine Chondroitin Complx) tablet Take 1 tablet by mouth Daily.       HYDROcodone-acetaminophen (NORCO) 5-325 MG per tablet Take 1 tablet by mouth Every 6 (Six) Hours As Needed for Moderate Pain (Pain). 5 tablet 0     ipratropium (ATROVENT) 0.02 % nebulizer solution Take 2.5 mL by nebulization 4 (Four) Times a Day.       levothyroxine (SYNTHROID, LEVOTHROID) 125 MCG tablet Take 1 tablet by mouth Daily.       metoprolol tartrate (LOPRESSOR) 25 MG tablet  Take 1 tablet by mouth 2 (Two) Times a Day.       metroNIDAZOLE (FLAGYL) 500 MG tablet Take 0.5 tablets by mouth 2 (Two) Times a Day As Needed.       O2 (OXYGEN) Inhale 3 L/min Daily.       Omega-3 Fatty Acids (fish oil) 1000 MG capsule capsule Take 1 capsule by mouth Daily With Breakfast. HOLDING FOR DOS       rosuvastatin (CRESTOR) 40 MG tablet Take 1 tablet by mouth Every Night.       Vitamin D, Cholecalciferol, (CHOLECALCIFEROL) 10 MCG (400 UNIT) tablet Take 1 tablet by mouth Daily. HOLDING FOR DOS           Hospital medications:  ceFAZolin, 2,000 mg, Intravenous, Once  famotidine, 20 mg, Intravenous, Once  sodium chloride, 3 mL, Intravenous, Q12H  sodium chloride, 3 mL, Intravenous, Q12H      lactated ringers, 9 mL/hr  lactated ringers, 9 mL/hr        fentanyl    lidocaine    midazolam    sodium chloride    sodium chloride    Family history:  Family History   Problem Relation Age of Onset    Malig Hyperthermia Neg Hx        Social history:  Social History     Tobacco Use    Smoking status: Former     Current packs/day: 0.00     Types: Cigarettes     Start date:      Quit date:      Years since quitting: 15.3   Vaping Use    Vaping status: Never Used   Substance Use Topics    Alcohol use: Yes     Comment: SOCIAL    Drug use: Not Currently     Objective:  TMax 24 hours:   Temp (24hrs), Av.1 °F (36.7 °C), Min:98.1 °F (36.7 °C), Max:98.1 °F (36.7 °C)      Vitals Ranges:   Temp:  [98.1 °F (36.7 °C)] 98.1 °F (36.7 °C)  Heart Rate:  [79-97] 79  Resp:  [20] 20  BP: (114)/(70) 114/70  Arterial Line BP: ()/(1-48) 103/48    Intake/Output Last 3 shifts:  No intake/output data recorded.     Physical Exam:    General Appearance:    Alert, cooperative, NAD                   Neuro/Psych:   Orientation intact, mood/affect pleasant       Results review:   I reviewed the patient's new clinical results.    Data review:  Lab Results (last 24 hours)       ** No results found for the last 24 hours. **              Imaging:  Imaging Results (Last 24 Hours)       ** No results found for the last 24 hours. **               Assessment:     Right renal mass    Plan:     Robotic assisted right nephroureterectomy    Shayne Clemente Jr., MD  04/24/25  06:53 EDT

## 2025-04-24 NOTE — ANESTHESIA PROCEDURE NOTES
Arterial Line      Patient reassessed immediately prior to procedure    Patient location during procedure: holding area  Start time: 4/24/2025 6:39 AM  Stop Time:4/24/2025 6:45 AM       Line placed for ABGs/Labs/ISTAT and hemodynamic monitoring.  Performed By   Anesthesiologist: Roberto Palomo MD   Preanesthetic Checklist  Completed: patient identified, IV checked, site marked, risks and benefits discussed, surgical consent, monitors and equipment checked, pre-op evaluation and timeout performed  Arterial Line Prep    Sterile Tech: cap, gloves and mask  Prep: ChloraPrep  Patient monitoring: blood pressure monitoring, continuous pulse oximetry and EKG  Arterial Line Procedure   Laterality:left  Location:  radial artery  Catheter size: 20 G   Guidance: ultrasound guided  PROCEDURE NOTE/ULTRASOUND INTERPRETATION.  Using ultrasound guidance the potential vascular sites for insertion of the catheter were visualized to determine the patency of the vessel to be used for vascular access.  After selecting the appropriate site for insertion, the needle was visualized under ultrasound being inserted into the radial artery, followed by ultrasound confirmation of wire and catheter placement. There were no abnormalities seen on ultrasound; an image was taken; and the patient tolerated the procedure with no complications.   Number of attempts: 1  Successful placement: yes Images: still images obtained, printed/placed on the chart  Post Assessment   Dressing Type: wrist guard applied and occlusive dressing applied.   Complications no   Patient Tolerance: patient tolerated the procedure well with no apparent complications  Additional Notes  Ultrasound guidance for vascular access requiring ultrasound evaluation of potential access sites,  documentation of patency, concurrent visualization of needle entry with permanent recording and reporting.

## 2025-04-24 NOTE — ANESTHESIA PREPROCEDURE EVALUATION
Anesthesia Evaluation     history of anesthetic complications:  difficult airway prolonged sedation  NPO Solid Status: > 8 hours  NPO Liquid Status: > 2 hours           Airway   Mallampati: II  TM distance: >3 FB  Neck ROM: limited  Possible difficult intubation and Small opening  Comment: Hx difficult intubation req CMAC  Dental - normal exam     Pulmonary - normal exam   (+) a smoker Former, COPD, asthma,home oxygen (3L/min at all times)  (-) sleep apnea    PE comment: nonlabored  Cardiovascular - normal exam  Exercise tolerance: poor (<4 METS)    ECG reviewed  Rhythm: regular  Rate: normal    (+) pacemaker ICD, hypertension, CAD, cardiac stents (10-12yrs ago per pt's wife) , CHF , hyperlipidemia  (-) valvular problems/murmurs, past MI, dysrhythmias, angina    ROS comment: Presence of Watchman left atrial appendage closure device  Cardiomyopathy--LV EF 30-35%        EKG:  - ABNORMAL ECG -  Atrial fibrillation  ventricular paced beats  Nonspecific  intraventricular conduction delay  ST elevation secondary to IVCD  Prolonged QT interval  When compared with ECG of 11-Aug-2022 22:21:15,  Significant rate increase  Significant repolarization change      Neuro/Psych- negative ROS  (-) seizures, TIA, CVA  GI/Hepatic/Renal/Endo    (+) renal disease (Renal Mass)-, thyroid problem   (-) GERD, liver disease, diabetes    Musculoskeletal (-) negative ROS    Abdominal    Substance History      OB/GYN          Other                      Anesthesia Plan    ASA 4     general and [place]     (CMAC used with last noted intubation)  intravenous induction     Anesthetic plan, risks, benefits, and alternatives have been provided, discussed and informed consent has been obtained with: patient.    Use of blood products discussed with patient  Consented to blood products.      CODE STATUS:

## 2025-04-24 NOTE — OP NOTE
Operative Report    [facility] MA[facility]    Patient: Vijay Foote  Age:      84 y.o.  :     1940  Sex:      male    Medical Record:  [id number]    Date of Operation/Procedure:  2025    Pre-operative Diagnosis:  Right renal masses    Post-operative Diagnosis:  Same    Surgeon:  Bryn    Name of Operation/Procedure:  Procedure(s) and Anesthesia Type:     * NEPHROURETERECTOMY LAPAROSCOPIC WITH DAVINCI ROBOT - General    Findings/Complications:  No complications.    Large, prominent liver.    Rind around the right renal vein.     Upon extraction of the kidney through the upper midline incision, bleeding was seen from the hilum, and a branch of the renal vein was seen to be patent. This was oversewn with 3-O Prolene. There were no problems with hemostasis afterwards.    Description of procedure: After informed consent was obtained, the patient was taken to the operating room and general anesthesia was induced. He was placed in supine position, prepped and draped in standard fashion. A timeout was performed and all team members were in agreement. A Veress needle was inserted through the RUQ, and the peritoneum was insufflated. Next, a 8mm Visiport was used to enter the abdomen. The abdomen was then inspected laparoscopically, and no adhesions were observed. The other ports were then inserted under direct vision. The patient was placed in Trendelenburg position, and the robot was docked.     The retroperitoneum was then entered, and the ureter was identified at the bifurcation of the iliac vessels. The ureter was freed from surrounding structures from the pelvic vessels to the ureterovesical junction. The bladder was entered just anterior to the UVJ, and the ureteral stent was delivered into the field. The ureteral orifice was then circumscribed. The ureter was then freed. The bladder was repaired with 2-0 Stratafix suture. The bladder was then filled with normal saline with no extravasation observed. The ureter  was dissected to a level approximately 3 cm above the point it crossed the iliac vessels.    At this point, the patient was taken out of Trendelenburg position and rotated into lateral position using the Tromp table. the colon was mobilized medially until the gonadal vein and ureter came into view. Then the plane along the psoas fascia was entered and dissected free.     We followed the ureter toward the renal hilum. However, there appeared to be a thick rind around the right renal vein, which was never completely visible. A space was made above the renal hilum bluntly. The EndoGIA stapler with a vascular load was then used to divide the renal hilum en bloc. A second artery was seen more posteriorly and controlled with a CA stapler. The superior attachments and lateral attachments were taken down with cautery. The gonadal vein was clipped and divided. The kidney was then free. It was too large for an endocatch bag. The laparoscopic ports were removed. We made an approximately 12 cm incision over the upper midline. The kidney was delivered and sent for pathologic analysis. Inspection of the peritoneum demonstrated bleeding from the area of the hilum. We held pressure with a lap, then assembled a Bookwalter retractor. Inspection demonstrated the bleeding to be coming from a branch of the renal vein. This was controlled with a long Aliss clamp. The branch could not be dissected free from surrounding structures due to the rind around the vein. Therefore, we closed the vein with running 3-O Prolene suture. We also ligated the lower pole segmental vessel with 2-O silk suture. A Nu-knit was placed over the psoas and adrenal bed. Excellent hemostasis was seen.        The fascia was closed with interrupted 0-Vicryl suture. The skin was closed with 4-0 Vicryl and dressed with Dermabond.    The patient was then awakened from anesthesia in the OR and taken to the recovery room in stable condition.    Estimated Blood Loss: 500  mL    Specimens:   Order Name Source Comment Collection Info Order Time   TYPE AND SCREEN Arm, Right  Collected By: Andreea Hernandez, RN 4/24/2025  5:49 AM     Release to patient   Routine Release        PREPARE RBC Other   4/24/2025 10:18 AM     When to Transfuse?   Hold          Indication for Transfusion   Surgery          Surgery Date   4/24/2025          Release to patient   Routine Release        TISSUE PATHOLOGY EXAM Kidney, Right  Collected By: Shayne Clemente Jr., MD 4/24/2025 11:10 AM     Release to patient   Routine Release            Fluids/Drains: peritoneal KANA    Shayne Clemente Jr., MD  4/24/2025  11:55 EDT

## 2025-04-24 NOTE — ANESTHESIA PROCEDURE NOTES
Airway  Reason: elective    Date/Time: 4/24/2025 7:18 AM  Difficult airway (history of - used cmac and still Gr2b view requiring cricoid.  CMAC suggested for future)    General Information and Staff    Patient location during procedure: OR  CRNA/CAA: Maximilian Miranda CRNA    Indications and Patient Condition  Indications for airway management: airway protection    Preoxygenated: yes    Mask difficulty assessment: 1 - vent by mask    Final Airway Details    Final airway type: endotracheal airway      Successful airway: ETT  Cuffed: yes   Successful intubation technique: video laryngoscopy  Adjuncts used in placement: intubating stylet  Endotracheal tube insertion site: oral  Blade: CMAC  Blade size: D  ETT size (mm): 7.0  Cormack-Lehane Classification: grade IIb - view of arytenoids or posterior of glottis only  Placement verified by: chest auscultation and capnometry   Measured from: teeth  ETT/EBT  to teeth (cm): 21  Number of attempts at approach: 1  Assessment: lips, teeth, and gum same as pre-op and atraumatic intubation

## 2025-04-24 NOTE — ANESTHESIA POSTPROCEDURE EVALUATION
"Patient: Vijay Foote    Procedure Summary       Date: 04/24/25 Room / Location: [facility] OR [address] [place] [address] MAIN OR    Anesthesia Start: 0706 Anesthesia Stop: 1153    Procedure: NEPHROURETERECTOMY LAPAROSCOPIC WITH DAVINCI ROBOT (Right: Abdomen) Diagnosis:     Surgeons: Shayne Clemente Jr., MD Provider: Roberto Palomo MD    Anesthesia Type: general, Marilyn ASA Status: 4            Anesthesia Type: general, Marilyn    Vitals  Vitals Value Taken Time   /84 04/24/25 16:30   Temp 36.5 °C (97.7 °F) 04/24/25 11:49   Pulse 96 04/24/25 16:30   Resp 16 04/24/25 16:30   SpO2 99 % 04/24/25 16:30           Post Anesthesia Care and Evaluation    Patient location during evaluation: PACU  Patient participation: complete - patient participated  Level of consciousness: awake and alert  Pain management: adequate    Airway patency: patent  Anesthetic complications: No anesthetic complications    Cardiovascular status: acceptable  Respiratory status: acceptable  Hydration status: acceptable    Comments: /74   Pulse 96   Temp 36.6 °C (97.8 °F) (Oral)   Resp 16   Ht 165.1 cm (65\")   SpO2 100%   BMI 23.83 kg/m²       "

## 2025-04-25 ENCOUNTER — APPOINTMENT (OUTPATIENT)
Dept: GENERAL RADIOLOGY | Facility: HOSPITAL | Age: 85
DRG: 656 | End: 2025-04-25
Payer: MEDICARE

## 2025-04-25 LAB
ANION GAP SERPL CALCULATED.3IONS-SCNC: 6.7 MMOL/L (ref 5–15)
BASE EXCESS BLDA CALC-SCNC: 4 MMOL/L (ref -5–5)
BH BB BLOOD EXPIRATION DATE: NORMAL
BH BB BLOOD TYPE BARCODE: 600
BH BB DISPENSE STATUS: NORMAL
BH BB PRODUCT CODE: NORMAL
BH BB UNIT NUMBER: NORMAL
BUN SERPL-MCNC: 24 MG/DL (ref 8–23)
BUN/CREAT SERPL: 18.3 (ref 7–25)
CALCIUM SPEC-SCNC: 9.3 MG/DL (ref 8.6–10.5)
CHLORIDE SERPL-SCNC: 105 MMOL/L (ref 98–107)
CO2 BLDA-SCNC: 31 MMOL/L (ref 24–29)
CO2 SERPL-SCNC: 30.3 MMOL/L (ref 22–29)
CREAT SERPL-MCNC: 1.31 MG/DL (ref 0.76–1.27)
CROSSMATCH INTERPRETATION: NORMAL
DEPRECATED RDW RBC AUTO: 42.9 FL (ref 37–54)
DEPRECATED RDW RBC AUTO: 47.2 FL (ref 37–54)
EGFRCR SERPLBLD CKD-EPI 2021: 54.3 ML/MIN/1.73
ERYTHROCYTE [DISTWIDTH] IN BLOOD BY AUTOMATED COUNT: 14.2 % (ref 12.3–15.4)
ERYTHROCYTE [DISTWIDTH] IN BLOOD BY AUTOMATED COUNT: 14.6 % (ref 12.3–15.4)
GLUCOSE BLDC GLUCOMTR-MCNC: 214 MG/DL (ref 70–130)
GLUCOSE SERPL-MCNC: 138 MG/DL (ref 65–99)
HCO3 BLDA-SCNC: 29.3 MMOL/L (ref 22–26)
HCT VFR BLD AUTO: 29.7 % (ref 37.5–51)
HCT VFR BLD AUTO: 30.6 % (ref 37.5–51)
HCT VFR BLD AUTO: 32 % (ref 37.5–51)
HCT VFR BLDA CALC: 26 % (ref 38–51)
HGB BLD-MCNC: 9.3 G/DL (ref 13–17.7)
HGB BLD-MCNC: 9.6 G/DL (ref 13–17.7)
HGB BLD-MCNC: 9.6 G/DL (ref 13–17.7)
HGB BLDA-MCNC: 8.8 G/DL (ref 12–17)
MCH RBC QN AUTO: 26.3 PG (ref 26.6–33)
MCH RBC QN AUTO: 26.7 PG (ref 26.6–33)
MCHC RBC AUTO-ENTMCNC: 30 G/DL (ref 31.5–35.7)
MCHC RBC AUTO-ENTMCNC: 31.4 G/DL (ref 31.5–35.7)
MCV RBC AUTO: 83.8 FL (ref 79–97)
MCV RBC AUTO: 88.9 FL (ref 79–97)
PCO2 BLDA: 52.9 MM HG (ref 35–45)
PH BLDA: 7.36 PH UNITS (ref 7.35–7.6)
PLATELET # BLD AUTO: 207 10*3/MM3 (ref 140–450)
PLATELET # BLD AUTO: 214 10*3/MM3 (ref 140–450)
PMV BLD AUTO: 8.7 FL (ref 6–12)
PMV BLD AUTO: 9.4 FL (ref 6–12)
PO2 BLDA: 263 MMHG (ref 80–105)
POTASSIUM BLDA-SCNC: 4.6 MMOL/L (ref 3.5–4.9)
POTASSIUM SERPL-SCNC: 4.6 MMOL/L (ref 3.5–5.2)
QT INTERVAL: 454 MS
QTC INTERVAL: 571 MS
RBC # BLD AUTO: 3.6 10*6/MM3 (ref 4.14–5.8)
RBC # BLD AUTO: 3.65 10*6/MM3 (ref 4.14–5.8)
SAO2 % BLDA: 100 % (ref 95–98)
SODIUM SERPL-SCNC: 142 MMOL/L (ref 136–145)
UNIT  ABO: NORMAL
UNIT  RH: NORMAL
WBC NRBC COR # BLD AUTO: 6.3 10*3/MM3 (ref 3.4–10.8)
WBC NRBC COR # BLD AUTO: 7.95 10*3/MM3 (ref 3.4–10.8)

## 2025-04-25 PROCEDURE — 94799 UNLISTED PULMONARY SVC/PX: CPT

## 2025-04-25 PROCEDURE — 93005 ELECTROCARDIOGRAM TRACING: CPT | Performed by: HOSPITALIST

## 2025-04-25 PROCEDURE — 25010000002 ONDANSETRON PER 1 MG: Performed by: UROLOGY

## 2025-04-25 PROCEDURE — 85027 COMPLETE CBC AUTOMATED: CPT | Performed by: UROLOGY

## 2025-04-25 PROCEDURE — 80048 BASIC METABOLIC PNL TOTAL CA: CPT | Performed by: UROLOGY

## 2025-04-25 PROCEDURE — 85027 COMPLETE CBC AUTOMATED: CPT | Performed by: HOSPITALIST

## 2025-04-25 PROCEDURE — 71045 X-RAY EXAM CHEST 1 VIEW: CPT

## 2025-04-25 PROCEDURE — 25810000003 SODIUM CHLORIDE 0.9 % SOLUTION: Performed by: UROLOGY

## 2025-04-25 PROCEDURE — 93010 ELECTROCARDIOGRAM REPORT: CPT | Performed by: STUDENT IN AN ORGANIZED HEALTH CARE EDUCATION/TRAINING PROGRAM

## 2025-04-25 PROCEDURE — 94640 AIRWAY INHALATION TREATMENT: CPT

## 2025-04-25 PROCEDURE — 94664 DEMO&/EVAL PT USE INHALER: CPT

## 2025-04-25 PROCEDURE — 85018 HEMOGLOBIN: CPT | Performed by: HOSPITALIST

## 2025-04-25 PROCEDURE — 85014 HEMATOCRIT: CPT | Performed by: HOSPITALIST

## 2025-04-25 RX ADMIN — METOPROLOL TARTRATE 25 MG: 25 TABLET, FILM COATED ORAL at 20:02

## 2025-04-25 RX ADMIN — OXYCODONE AND ACETAMINOPHEN 1 TABLET: 5; 325 TABLET ORAL at 11:26

## 2025-04-25 RX ADMIN — ONDANSETRON 4 MG: 2 INJECTION, SOLUTION INTRAMUSCULAR; INTRAVENOUS at 13:31

## 2025-04-25 RX ADMIN — BUDESONIDE 1 MG: 1 INHALANT ORAL at 22:09

## 2025-04-25 RX ADMIN — METOPROLOL TARTRATE 25 MG: 25 TABLET, FILM COATED ORAL at 09:47

## 2025-04-25 RX ADMIN — ONDANSETRON 4 MG: 2 INJECTION, SOLUTION INTRAMUSCULAR; INTRAVENOUS at 20:02

## 2025-04-25 RX ADMIN — ROSUVASTATIN 40 MG: 40 TABLET, FILM COATED ORAL at 20:02

## 2025-04-25 RX ADMIN — IPRATROPIUM BROMIDE 0.5 MG: 0.5 SOLUTION RESPIRATORY (INHALATION) at 07:18

## 2025-04-25 RX ADMIN — OXYCODONE AND ACETAMINOPHEN 1 TABLET: 5; 325 TABLET ORAL at 04:27

## 2025-04-25 RX ADMIN — FAMOTIDINE 20 MG: 20 TABLET, FILM COATED ORAL at 16:46

## 2025-04-25 RX ADMIN — SODIUM CHLORIDE 75 ML/HR: 9 INJECTION, SOLUTION INTRAVENOUS at 13:31

## 2025-04-25 RX ADMIN — IPRATROPIUM BROMIDE 0.5 MG: 0.5 SOLUTION RESPIRATORY (INHALATION) at 22:03

## 2025-04-25 RX ADMIN — SODIUM CHLORIDE 75 ML/HR: 9 INJECTION, SOLUTION INTRAVENOUS at 06:29

## 2025-04-25 RX ADMIN — BUDESONIDE 1 MG: 1 INHALANT ORAL at 07:20

## 2025-04-25 RX ADMIN — ARFORMOTEROL TARTRATE 15 MCG: 15 SOLUTION RESPIRATORY (INHALATION) at 22:08

## 2025-04-25 RX ADMIN — SENNOSIDES AND DOCUSATE SODIUM 2 TABLET: 50; 8.6 TABLET ORAL at 20:02

## 2025-04-25 RX ADMIN — LEVOTHYROXINE SODIUM 125 MCG: 125 TABLET ORAL at 04:28

## 2025-04-25 RX ADMIN — OXYCODONE AND ACETAMINOPHEN 1 TABLET: 5; 325 TABLET ORAL at 17:59

## 2025-04-25 RX ADMIN — IPRATROPIUM BROMIDE 0.5 MG: 0.5 SOLUTION RESPIRATORY (INHALATION) at 15:29

## 2025-04-25 RX ADMIN — ARFORMOTEROL TARTRATE 15 MCG: 15 SOLUTION RESPIRATORY (INHALATION) at 07:19

## 2025-04-25 RX ADMIN — IPRATROPIUM BROMIDE 0.5 MG: 0.5 SOLUTION RESPIRATORY (INHALATION) at 11:34

## 2025-04-25 RX ADMIN — FAMOTIDINE 20 MG: 20 TABLET, FILM COATED ORAL at 08:10

## 2025-04-25 NOTE — PLAN OF CARE
Goal Outcome Evaluation:            Pt A/Ox4, 3L oxygen via nasal cannula. Tachycardic,  and Bryn aware. CXR ordered. Encouraged pt to get up in chair and ambulate. KANA leaking around site, new dressing applied. Abd binder utilized. Waiting for CXR, up with chair with assist x1. Plan of care ongoing, VSS.

## 2025-04-25 NOTE — PLAN OF CARE
Goal Outcome Evaluation:  Plan of Care Reviewed With: patient, spouse        Progress: no change  Outcome Evaluation: Abd. dressings, KANA drain, abd. binder in place. Pain med and pillow to splint abd for abd. pain. O2 3L n/c baseline, used at home. IVF's continue. F/C intact. Dr Clemente saw pt this morning. 500cc NS fluid bolus ordered and given. Wife at bedside.

## 2025-04-25 NOTE — CASE MANAGEMENT/SOCIAL WORK
Discharge Planning Assessment  [facility]     Patient Name: Vijay Foote  MRN: [id number]  Today's Date: 4/25/2025    Admit Date: 4/24/2025    Plan: Home with [facility]. Spouse to transport.   Discharge Needs Assessment       Row Name 04/25/25 1211       Living Environment    People in Home spouse    Current Living Arrangements home    Primary Care Provided by self    Provides Primary Care For no one    Family Caregiver if Needed spouse       Resource/Environmental Concerns    Transportation Concerns none       Transition Planning    Patient/Family Anticipates Transition to home with family    Patient/Family Anticipated Services at Transition none    Transportation Anticipated family or friend will provide       Discharge Needs Assessment    Readmission Within the Last 30 Days no previous admission in last 30 days    Equipment Currently Used at Home walker, rolling;oxygen;shower chair    Concerns to be Addressed no discharge needs identified;denies needs/concerns at this time    Anticipated Changes Related to Illness none    Equipment Needed After Discharge none                   Discharge Plan       Row Name 04/25/25 1214       Plan    Plan Home with [facility]. Spouse to transport.    Patient/Family in Agreement with Plan yes    Plan Comments CCP met with pt and pt's wife/Murtaza at bedside. Introduced self and role. Pt gave CCP permission to speak in front of Murtaza. Facesheet information and pharmacy verified. Pt uses [facility]. Pt lives in a 2STE 1.5 story house with spouse. Pt drives, is IADLs and has a walker, SC and o2 3 L continuous through [employer]. Pt is current with A . Pt has no history of using SNF in the past. Pt does have a living will. Pt is enrolled in [facility]. Pt denies trouble affording or managing medications. Referrals sent to [facility] and [facility]. LVM for Norbert[facility]. LVM for Viv[facility]. Plan will be to return home with [facility]. Spouse to transport. CCP will continue to follow for any DC needs. RLutes  RN/CCP                  Continued Care and Services - Admitted Since 4/24/2025       Durable Medical Equipment       Service Provider Request Status Services Address Phone Fax Patient Preferred    [facility] Pending - Request Sent -- [address] [facility] [address] [phone] --              Home Medical Care       Service Provider Request Status Services Address Phone Fax Patient Preferred    [facility] [address] [facility] [address] [address] [phone] --                  Selected Continued Care - Prior Encounters Includes continued care and service providers with selected services from prior encounters from 1/24/2025 to 4/25/2025      Discharged on 3/19/2025 Admission date: 3/10/2025 - Discharge disposition: Home-Health Care Svc      Home Medical Care       Service Provider Services Address Phone Fax Patient Preferred    [facility] [address] [facility] [address] [address] [phone] --                             Demographic Summary       Row Name 04/25/25 1211       General Information    Admission Type inpatient    Arrived From home    Required Notices Provided Important Message from Medicare    Referral Source case finding;admission list    Preferred Language English       Contact Information    Permission Granted to Share Info With ;family/designee                   Functional Status       Row Name 04/25/25 1211       Functional Status    Usual Activity Tolerance good    Current Activity Tolerance moderate       Physical Activity    On average, how many days per week do you engage in moderate to strenuous exercise (like a brisk walk)? Pt Declined    On average, how many minutes do you engage in exercise at this level? Pt Declined       Assessment of Health Literacy    How often do you have someone help you  read hospital materials? Never    How often do you have problems learning about your medical condition because of difficulty understanding written information? Never    How often do you have a problem understanding what is told to you about your medical condition? Never    How confident are you filling out medical forms by yourself? Extremely    Health Literacy Excellent                   Psychosocial    No documentation.                  Abuse/Neglect    No documentation.                  Legal    No documentation.                  Substance Abuse    No documentation.                  Patient Forms    No documentation.                     Kristen Truong RN

## 2025-04-25 NOTE — CONSULTS
Inpatient Hospitalist Consult  Consult performed by: Boris Espinoza MD  Consult ordered by: Shayne Clemente Jr., MD  Reason for consult: Dyspnea, shortness of breath      Date of Admit: 4/24/2025  Date of Consult: 04/25/25    Subjective   84 y.o. male with a history of urothelial carcinoma had right nephro ureterectomy laparoscopic with daVinci robot on 4/24/2025. [facility] consulted for dyspnea.    Patient has a history of right renal mass he was here last month for hematuria and blood loss anemia and hemorrhagic shock required transfusions and pressors and clot evacuation and irrigation at that time. He also has a history of atrial fibrillation status post watchman and ICD. He was previously on anticoagulation but switched to DAPT and currently on Plavix which was held for above procedure. He also has history of COPD, AISSATOU, and chronic hypoxic respiratory failure. He is on 3 L/min at baseline.    He states his dyspnea is about the same as baseline. He is currently on Flow (L/min) (Oxygen Therapy):  [3] 3. He is sitting up on the side of the bed. Wife is at bedside.    Past Medical History:   Diagnosis Date    Arthritis     OSTEOARTHRITIS    Asthma     Atrial fibrillation     Cancer     UROTHELIAL CANCER    Cardiomyopathy     Chronic heart failure     COPD (chronic obstructive pulmonary disease)     Coronary artery disease     Disease of thyroid gland     Emphysema/COPD     GERD (gastroesophageal reflux disease)     H/O heart artery stent     2 STENTS    Hard to intubate     ON CHART HEADER, PT WAS  UNAWARE    Hematuria     History of transfusion     Hyperlipidemia     Hypertension     ICD (implantable cardioverter-defibrillator) in place     Kidney stone     LBBB (left bundle branch block)     On home O2     3L    Presence of Watchman left atrial appendage closure device     Weight loss     40 POUNDS IN LAST YEAR     Past Surgical History:   Procedure Laterality Date    CARDIAC CATHETERIZATION      CARDIAC  DEFIBRILLATOR PLACEMENT      CORONARY STENT PLACEMENT      CYSTOSCOPY N/A 03/12/2025    Procedure: CYSTOSCOPY, clot evacuation, catheter insertion;  Surgeon: Chance Reyes MD;  Location: [facility] OR;  Service: Urology;  Laterality: N/A;    CYSTOSCOPY BLADDER BIOPSY Left 04/10/2025    Procedure: CYSTOSCOPY WITH BLADDER BIOPSY;  Surgeon: Shayne Clemente Jr., MD;  Location: [facility] MAIN OR;  Service: Urology;  Laterality: Left;    MASTOIDECTOMY Right     NEPHROURETERECTOMY Right 4/24/2025    Procedure: NEPHROURETERECTOMY LAPAROSCOPIC WITH DAVINCI ROBOT;  Surgeon: Shayne Clemente Jr., MD;  Location: [facility] OR;  Service: Robotics - DaVinci;  Laterality: Right;    ORIF ANKLE FRACTURE Right     ROTATOR CUFF REPAIR Left     TONSILLECTOMY      TOTAL SHOULDER ARTHROPLASTY W/ DISTAL CLAVICLE EXCISION Right 08/11/2022    Procedure: RIGHT REVERSE TOTAL SHOULDER ARTHROPLASTY;  Surgeon: Wali Orellana MD;  Location: [facility];  Service: Orthopedics;  Laterality: Right;    TRIGGER FINGER RELEASE Left        Family History   Problem Relation Age of Onset    Malig Hyperthermia Neg Hx      Social History     Tobacco Use    Smoking status: Former     Current packs/day: 0.00     Types: Cigarettes     Start date: 1960     Quit date: 2010     Years since quitting: 15.3   Vaping Use    Vaping status: Never Used   Substance Use Topics    Alcohol use: Yes     Comment: SOCIAL    Drug use: Not Currently     Objective      Vital Signs  Temp:  [97.5 °F (36.4 °C)-97.9 °F (36.6 °C)] 97.5 °F (36.4 °C)  Heart Rate:  [] 94  Resp:  [16-20] 20  BP: ()/(46-84) 103/65    Physical Exam  Constitutional:       General: He is not in acute distress.     Appearance: He is ill-appearing. He is not toxic-appearing.   HENT:      Head: Normocephalic and atraumatic.   Cardiovascular:      Rate and Rhythm: Normal rate and regular rhythm.   Pulmonary:      Effort: Pulmonary effort is normal. No respiratory distress.       Breath sounds: Decreased breath sounds present. No wheezing or rhonchi.   Abdominal:      Comments: Postop changes   Musculoskeletal:         General: No swelling.      Right lower leg: No edema.      Left lower leg: No edema.   Skin:     General: Skin is warm and dry.   Neurological:      General: No focal deficit present.      Mental Status: He is alert and oriented to person, place, and time.   Psychiatric:         Mood and Affect: Mood normal.         Behavior: Behavior normal.      Scheduled Meds  arformoterol, 15 mcg, Nebulization, BID - RT  bisacodyl, 10 mg, Rectal, Daily  budesonide, 1 mg, Nebulization, BID - RT  famotidine, 20 mg, Oral, BID AC  furosemide, 40 mg, Oral, Daily  ipratropium, 500 mcg, Nebulization, 4x Daily - RT  levothyroxine, 125 mcg, Oral, Q AM  metoprolol tartrate, 25 mg, Oral, BID  rosuvastatin, 40 mg, Oral, Nightly  senna-docusate sodium, 2 tablet, Oral, BID  sodium chloride, 500 mL, Intravenous, Once    Continuous Infusions  sodium chloride, 75 mL/hr, Last Rate: 75 mL/hr (04/25/25 1331)    PRN Meds    albuterol sulfate HFA    HYDROcodone-acetaminophen    HYDROmorphone **AND** naloxone    ondansetron ODT **OR** ondansetron    oxyCODONE-acetaminophen    Diet: Liquid; Full Liquid; Fluid Consistency: Thin (IDDSI 0)     Assessment & Plan   Active Hospital Problems    Diagnosis  POA    **Right renal mass [N28.89]  Yes    Presence of Watchman left atrial appendage closure device [Z95.818]  Yes    Hypertension [I10]  Yes    Coronary artery disease [I25.10]  Yes    COPD (chronic obstructive pulmonary disease) [J44.9]  Yes    Hyperlipidemia [E78.5]  Yes      Resolved Hospital Problems   No resolved problems to display.     Patient is a 84 y.o. male    Urothelial carcinoma  Status post right nephroureterectomy 4/24/2025  Elevated creatinine expected continue to monitor  Low normal BPs IVF per urology  Some mild blood loss anemia could be contributing- monitor in case needs  transfusion    COPD  Chronic hypoxic respiratory failure  AISSATOU  He is currently on Flow (L/min) (Oxygen Therapy):  [3] 3 which is his baseline oxygen requirement. He states he has chronic dyspnea which is about the same  Check chest x-ray make sure not overloaded (on IVF monitoring closely)    Atrial fibrillation status post watchman  ICD  Chronic systolic CHF  Echo from July 2024 EF 31% (Alonzo)  Plavix on hold until okay with urology    Hypertension  Hyperlipidemia statin  Hypothyroidism levothyroxine    Discussed with patient, family, and nursing staff.    Boris Espinoza MD  [facility]  04/25/25 15:56 EDT

## 2025-04-25 NOTE — PROGRESS NOTES
POD1 s/p R robotic nephroureterectomy    Mild abd soreness. No nausea.    NAD, A&Ox3  Good UO  Abd soft, nondistended  Inc c/d/I  Ayala intact, urine yellow and clear.  KANA serosanguinous    Labs appropriate    Plan:  - ambulate  - advance diet  - Dulcolax suppository  - continue indwelling catheter  - fluid bolus

## 2025-04-25 NOTE — DISCHARGE PLACEMENT REQUEST
"Vijay Reynolds R \"MARY ELLEN\" (84 y.o. Male)       Date of Birth   1940    Social Security Number       Address   [facility] [place] [address]    Home Phone   [phone]    MRN   [id number]       Congregation   Judaism    Marital Status                               Admission Date   4/24/2025    Admission Type   Elective    Admitting Provider   Shayne Clemente Jr., MD    Attending Provider   Shayne Clemente Jr., MD    Department, Room/Bed   [facility] [address] [place], P399/1       Discharge Date       Discharge Disposition       Discharge Destination                                 Attending Provider: Shayne Clemente Jr., MD    Allergies: No Known Allergies    Isolation: None   Infection: None   Code Status: CPR    Ht: 165.1 cm (65\")   Wt: 65 kg (143 lb 3.2 oz)    Admission Cmt: None   Principal Problem: Right renal mass [N28.89]                   Active Insurance as of 4/24/2025       Primary Coverage       Payor Plan Insurance Group Employer/Plan Group    MEDICARE MEDICARE A & B        Payor Plan Address Payor Plan Phone Number Payor Plan Fax Number Effective Dates    [address] [phone]  7/1/2007 - None Entered    [facility] [id number]         Subscriber Name Subscriber Birth Date Member ID       VIJAY REYNOLDS 1940 [id number]               Secondary Coverage       Payor Plan Insurance Group Employer/Plan Group    AARP MC SUP AARP HEALTH CARE OPTIONS        Payor Plan Address Payor Plan Phone Number Payor Plan Fax Number Effective Dates    [facility] [phone]  1/1/2019 - None Entered    [address]       [facility] [address]         Subscriber Name Subscriber Birth Date Member ID       VIJAY REYNOLDS 1940 [id number]                     Emergency Contacts        (Rel.) Home Phone Work Phone Mobile Phone    RODOLFOJANETH (Spouse) [phone] -- [phone]                "

## 2025-04-26 PROBLEM — R73.9 HYPERGLYCEMIA: Status: ACTIVE | Noted: 2025-04-26

## 2025-04-26 LAB
ANION GAP SERPL CALCULATED.3IONS-SCNC: 6.4 MMOL/L (ref 5–15)
BUN SERPL-MCNC: 21 MG/DL (ref 8–23)
BUN/CREAT SERPL: 17.2 (ref 7–25)
CALCIUM SPEC-SCNC: 9.4 MG/DL (ref 8.6–10.5)
CHLORIDE SERPL-SCNC: 104 MMOL/L (ref 98–107)
CO2 SERPL-SCNC: 30.6 MMOL/L (ref 22–29)
CREAT SERPL-MCNC: 1.22 MG/DL (ref 0.76–1.27)
DEPRECATED RDW RBC AUTO: 44.4 FL (ref 37–54)
EGFRCR SERPLBLD CKD-EPI 2021: 59.2 ML/MIN/1.73
ERYTHROCYTE [DISTWIDTH] IN BLOOD BY AUTOMATED COUNT: 14.5 % (ref 12.3–15.4)
GLUCOSE SERPL-MCNC: 147 MG/DL (ref 65–99)
HBA1C MFR BLD: 5.6 % (ref 4.8–5.6)
HCT VFR BLD AUTO: 28.8 % (ref 37.5–51)
HCT VFR BLD AUTO: 29.3 % (ref 37.5–51)
HGB BLD-MCNC: 9.2 G/DL (ref 13–17.7)
HGB BLD-MCNC: 9.2 G/DL (ref 13–17.7)
MCH RBC QN AUTO: 27 PG (ref 26.6–33)
MCHC RBC AUTO-ENTMCNC: 31.9 G/DL (ref 31.5–35.7)
MCV RBC AUTO: 84.5 FL (ref 79–97)
PLATELET # BLD AUTO: 175 10*3/MM3 (ref 140–450)
PMV BLD AUTO: 9 FL (ref 6–12)
POTASSIUM SERPL-SCNC: 4.1 MMOL/L (ref 3.5–5.2)
RBC # BLD AUTO: 3.41 10*6/MM3 (ref 4.14–5.8)
SODIUM SERPL-SCNC: 141 MMOL/L (ref 136–145)
WBC NRBC COR # BLD AUTO: 6.4 10*3/MM3 (ref 3.4–10.8)

## 2025-04-26 PROCEDURE — 97116 GAIT TRAINING THERAPY: CPT

## 2025-04-26 PROCEDURE — 80048 BASIC METABOLIC PNL TOTAL CA: CPT | Performed by: UROLOGY

## 2025-04-26 PROCEDURE — 85018 HEMOGLOBIN: CPT | Performed by: HOSPITALIST

## 2025-04-26 PROCEDURE — 83036 HEMOGLOBIN GLYCOSYLATED A1C: CPT | Performed by: HOSPITALIST

## 2025-04-26 PROCEDURE — 94761 N-INVAS EAR/PLS OXIMETRY MLT: CPT

## 2025-04-26 PROCEDURE — 85027 COMPLETE CBC AUTOMATED: CPT | Performed by: UROLOGY

## 2025-04-26 PROCEDURE — 94664 DEMO&/EVAL PT USE INHALER: CPT

## 2025-04-26 PROCEDURE — 25810000003 SODIUM CHLORIDE 0.9 % SOLUTION: Performed by: UROLOGY

## 2025-04-26 PROCEDURE — 85014 HEMATOCRIT: CPT | Performed by: HOSPITALIST

## 2025-04-26 PROCEDURE — 94799 UNLISTED PULMONARY SVC/PX: CPT

## 2025-04-26 PROCEDURE — 97161 PT EVAL LOW COMPLEX 20 MIN: CPT

## 2025-04-26 RX ORDER — BISACODYL 10 MG
10 SUPPOSITORY, RECTAL RECTAL ONCE
Status: COMPLETED | OUTPATIENT
Start: 2025-04-26 | End: 2025-04-26

## 2025-04-26 RX ORDER — GINSENG 100 MG
1 CAPSULE ORAL EVERY 8 HOURS SCHEDULED
Status: DISCONTINUED | OUTPATIENT
Start: 2025-04-26 | End: 2025-04-27 | Stop reason: HOSPADM

## 2025-04-26 RX ORDER — GINSENG 100 MG
1 CAPSULE ORAL EVERY 8 HOURS SCHEDULED
Status: DISCONTINUED | OUTPATIENT
Start: 2025-04-26 | End: 2025-04-26

## 2025-04-26 RX ADMIN — HYDROCODONE BITARTRATE AND ACETAMINOPHEN 1 TABLET: 5; 325 TABLET ORAL at 08:43

## 2025-04-26 RX ADMIN — LEVOTHYROXINE SODIUM 125 MCG: 125 TABLET ORAL at 06:23

## 2025-04-26 RX ADMIN — FUROSEMIDE 40 MG: 40 TABLET ORAL at 08:38

## 2025-04-26 RX ADMIN — FAMOTIDINE 20 MG: 20 TABLET, FILM COATED ORAL at 06:24

## 2025-04-26 RX ADMIN — METOPROLOL TARTRATE 25 MG: 25 TABLET, FILM COATED ORAL at 08:38

## 2025-04-26 RX ADMIN — ROSUVASTATIN 40 MG: 40 TABLET, FILM COATED ORAL at 20:09

## 2025-04-26 RX ADMIN — IPRATROPIUM BROMIDE 0.5 MG: 0.5 SOLUTION RESPIRATORY (INHALATION) at 14:47

## 2025-04-26 RX ADMIN — SODIUM CHLORIDE 75 ML/HR: 9 INJECTION, SOLUTION INTRAVENOUS at 02:12

## 2025-04-26 RX ADMIN — BACITRACIN 0.9 G: 500 OINTMENT TOPICAL at 17:36

## 2025-04-26 RX ADMIN — ARFORMOTEROL TARTRATE 15 MCG: 15 SOLUTION RESPIRATORY (INHALATION) at 06:45

## 2025-04-26 RX ADMIN — SENNOSIDES AND DOCUSATE SODIUM 2 TABLET: 50; 8.6 TABLET ORAL at 20:09

## 2025-04-26 RX ADMIN — FAMOTIDINE 20 MG: 20 TABLET, FILM COATED ORAL at 17:36

## 2025-04-26 RX ADMIN — IPRATROPIUM BROMIDE 0.5 MG: 0.5 SOLUTION RESPIRATORY (INHALATION) at 06:44

## 2025-04-26 RX ADMIN — SENNOSIDES AND DOCUSATE SODIUM 2 TABLET: 50; 8.6 TABLET ORAL at 08:38

## 2025-04-26 RX ADMIN — BUDESONIDE 1 MG: 1 INHALANT ORAL at 06:46

## 2025-04-26 RX ADMIN — BISACODYL 10 MG: 10 SUPPOSITORY RECTAL at 14:57

## 2025-04-26 RX ADMIN — METOPROLOL TARTRATE 25 MG: 25 TABLET, FILM COATED ORAL at 20:09

## 2025-04-26 RX ADMIN — IPRATROPIUM BROMIDE 0.5 MG: 0.5 SOLUTION RESPIRATORY (INHALATION) at 10:50

## 2025-04-26 NOTE — PROGRESS NOTES
Name: Vijay Foote ADMIT: 2025   : 1940  PCP: German Mccray MD    MRN: [id number] LOS: 2 days   AGE/SEX: 84 y.o. male  ROOM: [facility]     Subjective   Subjective   Seems a little confused this morning. Oriented to self and year but did not know location or situation. States his abdomen was sore.     Objective   Objective   Vital Signs  Temp:  [97.5 °F (36.4 °C)-98.1 °F (36.7 °C)] 97.5 °F (36.4 °C)  Heart Rate:  [] 90  Resp:  [18-20] 18  BP: (103-116)/(58-70) 103/67  SpO2:  [90 %-99 %] 99 %  on  Flow (L/min) (Oxygen Therapy):  [3] 3;   Device (Oxygen Therapy): nasal cannula  Body mass index is 24.29 kg/m².    Physical Exam  Constitutional:       General: He is not in acute distress.     Appearance: He is ill-appearing (chronic). He is not toxic-appearing.   HENT:      Head: Normocephalic and atraumatic.   Cardiovascular:      Rate and Rhythm: Normal rate and regular rhythm.   Pulmonary:      Effort: Pulmonary effort is normal. No respiratory distress.      Breath sounds: Normal breath sounds. No wheezing or rhonchi.   Abdominal:      Comments: Abdominal dressing   Musculoskeletal:         General: No swelling.   Skin:     General: Skin is warm and dry.   Neurological:      General: No focal deficit present.      Mental Status: He is alert.      Comments: Oriented to self and year but not situation or location. Appropriate, moving all extremities.   Psychiatric:         Mood and Affect: Mood normal.         Behavior: Behavior normal.     Results Review  I reviewed the patient's new clinical results.  Results from last 7 days   Lab Units 25  0704 25  0023 25  1607 25  1517 25  0737 25  1210   WBC 10*3/mm3 6.40  --   --  7.95 6.30 5.34   HEMOGLOBIN g/dL 9.2* 9.2* 9.3* 9.6* 9.6* 10.3*   PLATELETS 10*3/mm3 175  --   --  207 214 203     Results from last 7 days   Lab Units 25  0704 25  0737 25  1210   SODIUM mmol/L 141 142 139   POTASSIUM  mmol/L 4.1 4.6 4.6   CHLORIDE mmol/L 104 105 105   CO2 mmol/L 30.6* 30.3* 27.0   BUN mg/dL 21 24* 24*   CREATININE mg/dL 1.22 1.31* 1.08   GLUCOSE mg/dL 147* 138* 202*     Lab Results   Component Value Date    ANIONGAP 6.4 04/26/2025     Estimated Creatinine Clearance: 43.7 mL/min (by C-G formula based on SCr of 1.22 mg/dL).   Lab Results   Component Value Date    EGFR 59.2 (L) 04/26/2025         Results from last 7 days   Lab Units 04/26/25  0704 04/25/25  0737 04/24/25  1210   CALCIUM mg/dL 9.4 9.3 9.9       Glucose   Date/Time Value Ref Range Status   04/24/2025 0959 214 (H) 70 - 130 mg/dL Final       XR Chest 1 View  Result Date: 4/25/2025  1. Free intraperitoneal gas beneath hemidiaphragms attributed to recent surgery. 2. Small pleural effusions with basal atelectasis. 3. Cardiomegaly. 4. Subcutaneous emphysema along the right lateral upper abdominal wall.   This report was finalized on 4/25/2025 8:08 PM by Rishi Conway M.D on Workstation: [id number]      XR Surgical Count Protocol  Result Date: 4/24/2025   As described.    This report was finalized on 4/24/2025 11:38 AM by Dr. Hayden Cooney M.D on Workstation: [id number]        Scheduled Meds  arformoterol, 15 mcg, Nebulization, BID - RT  bisacodyl, 10 mg, Rectal, Daily  budesonide, 1 mg, Nebulization, BID - RT  famotidine, 20 mg, Oral, BID AC  furosemide, 40 mg, Oral, Daily  ipratropium, 500 mcg, Nebulization, 4x Daily - RT  levothyroxine, 125 mcg, Oral, Q AM  metoprolol tartrate, 25 mg, Oral, BID  rosuvastatin, 40 mg, Oral, Nightly  senna-docusate sodium, 2 tablet, Oral, BID  sodium chloride, 500 mL, Intravenous, Once    Continuous Infusions  sodium chloride, 75 mL/hr, Last Rate: 75 mL/hr (04/26/25 0212)    PRN Meds    albuterol sulfate HFA    HYDROcodone-acetaminophen    HYDROmorphone **AND** naloxone    ondansetron ODT **OR** ondansetron    oxyCODONE-acetaminophen    sodium chloride, 75 mL/hr, Last Rate: 75 mL/hr (04/26/25 0212)    Diet  Diet:  Liquid; Full Liquid; Fluid Consistency: Thin (IDDSI 0)       Assessment/Plan     Active Hospital Problems    Diagnosis  POA    **Right renal mass [N28.89]  Yes    Hyperglycemia [R73.9]  Unknown    Presence of Watchman left atrial appendage closure device [Z95.818]  Yes    Hypertension [I10]  Yes    Coronary artery disease [I25.10]  Yes    COPD (chronic obstructive pulmonary disease) [J44.9]  Yes    Hyperlipidemia [E78.5]  Yes      Resolved Hospital Problems   No resolved problems to display.     Patient is a 84 y.o. male     Urothelial carcinoma  Status post right nephroureterectomy 4/24/2025  Elevated creatinine expected continue to monitor  Low normal BPs IVF per urology prob could dc if eating (low EF)  Some mild blood loss anemia could be contributing- monitoring in case needs transfusion-has been stable    Toxic encephalopathy  Nursing staff reported that wife states he will get confused with pain medication  Delirium precautions     COPD  Chronic hypoxic respiratory failure  AISSATOU  He is currently on Flow (L/min) (Oxygen Therapy):  [3] 3 which is his baseline oxygen requirement. He has chronic dyspnea which is the same  Chest x-ray small pleural effusions and atelectasis and postop changes     Atrial fibrillation status post watchman  ICD  Chronic systolic CHF  Echo from July 2024 EF 31% (Alonzo)  Plavix on hold until okay with urology     Hypertension Lasix and metoprolol with holding parameters  Hyperlipidemia statin  Hypothyroidism levothyroxine  Hypoglycemia last A1c in September 6.2% will check and add on    DVT prophylaxis  SCDs    Discussed with patient and nursing staff    Boris Espinoza MD  [facility]  04/26/25 09:47 EDT

## 2025-04-26 NOTE — PLAN OF CARE
Goal Outcome Evaluation:              Outcome Evaluation: Pt was nauseated last night, zofran given. He was slightly confused at times, his wife stated it always happen after he takes a pain medication. I didnt give him any pain meds overnight, he looks uncomfortable, but didnt want pain med. Slept most of the night.

## 2025-04-26 NOTE — PLAN OF CARE
Goal Outcome Evaluation:  Plan of Care Reviewed With: patient           Outcome Evaluation: Pt s/p nephroureterectomy and reporting pain rated 2/10. Pt lives with wife with 2 ROWENA and no HR. Previously I with ADLs, however HH assist with bathing. Pt able to perform bed mobility with Radha, limited by pain. STS and gait performed with CGA and no AD. Pt would benefit from skilled PT services and plans to D/C home with HHPT.    Anticipated Discharge Disposition (PT): home with assist, home with home health

## 2025-04-26 NOTE — THERAPY EVALUATION
Patient Name: Vijay Foote  : 1940    MRN: [id number]                              Today's Date: 2025       Admit Date: 2025    Visit Dx:     ICD-10-CM ICD-9-CM   1. 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     Patient Active Problem List   Diagnosis    S/P reverse total shoulder arthroplasty, right    Postoperative hypotension    Hyperlipidemia    COPD (chronic obstructive pulmonary disease)    Hematuria    Hypertension    Coronary artery disease    CHF (congestive heart failure)    Renal mass    Presence of Watchman left atrial appendage closure device    Hemorrhagic shock    Right renal mass    Hyperglycemia     Past Medical History:   Diagnosis Date    Arthritis     OSTEOARTHRITIS    Asthma     Atrial fibrillation     Cancer     UROTHELIAL CANCER    Cardiomyopathy     Chronic heart failure     COPD (chronic obstructive pulmonary disease)     Coronary artery disease     Disease of thyroid gland     Emphysema/COPD     GERD (gastroesophageal reflux disease)     H/O heart artery stent     2 STENTS    Hard to intubate     ON CHART HEADER, PT WAS  UNAWARE    Hematuria     History of transfusion     Hyperlipidemia     Hypertension     ICD (implantable cardioverter-defibrillator) in place     Kidney stone     LBBB (left bundle branch block)     On home O2     3L    Presence of Watchman left atrial appendage closure device     Weight loss     40 POUNDS IN LAST YEAR     Past Surgical History:   Procedure Laterality Date    CARDIAC CATHETERIZATION      CARDIAC DEFIBRILLATOR PLACEMENT      CORONARY STENT PLACEMENT      CYSTOSCOPY N/A 2025    Procedure: CYSTOSCOPY, clot evacuation, catheter insertion;  Surgeon: Chance Reyes MD;  Location: [facility];  Service: Urology;  Laterality: N/A;    CYSTOSCOPY BLADDER BIOPSY Left 04/10/2025    Procedure: CYSTOSCOPY WITH BLADDER BIOPSY;  Surgeon: Shayne Clemente Jr., MD;  Location: [facility];  Service: Urology;  Laterality: Left;     MASTOIDECTOMY Right     NEPHROURETERECTOMY Right 4/24/2025    Procedure: NEPHROURETERECTOMY LAPAROSCOPIC WITH OptiNoseINCI ROBOT;  Surgeon: Shayne Clemente Jr., MD;  Location: [facility];  Service: Robotics - DaVinci;  Laterality: Right;    ORIF ANKLE FRACTURE Right     ROTATOR CUFF REPAIR Left     TONSILLECTOMY      TOTAL SHOULDER ARTHROPLASTY W/ DISTAL CLAVICLE EXCISION Right 08/11/2022    Procedure: RIGHT REVERSE TOTAL SHOULDER ARTHROPLASTY;  Surgeon: Wali Orellana MD;  Location: [facility];  Service: Orthopedics;  Laterality: Right;    TRIGGER FINGER RELEASE Left       General Information       Row Name 04/26/25 1326          Physical Therapy Time and Intention    Document Type evaluation  -CN     Mode of Treatment individual therapy;physical therapy  -CN       Row Name 04/26/25 1326          General Information    Patient Profile Reviewed yes  -CN     Prior Level of Function independent:;all household mobility;community mobility;ADL's  HH assist with shower  -CN     Existing Precautions/Restrictions fall;oxygen therapy device and L/min  -CN       Row Name 04/26/25 1326          Living Environment    Current Living Arrangements home  -CN     People in Home spouse  -CN       Row Name 04/26/25 1326          Home Main Entrance    Number of Stairs, Main Entrance two  -CN     Stair Railings, Main Entrance none  -CN       Row Name 04/26/25 1326          Stairs Within Home, Primary    Stairs, Within Home, Primary basement to laundry which wife is able to do  -CN       Row Name 04/26/25 1326          Cognition    Orientation Status (Cognition) oriented x 4  -CN       Row Name 04/26/25 1326          Safety Issues/Impairments Affecting Functional Mobility    Impairments Affecting Function (Mobility) balance;pain;endurance/activity tolerance;strength  -CN               User Key  (r) = Recorded By, (t) = Taken By, (c) = Cosigned By      Initials Name Provider Type    CN Elisabet Tan, PT Physical  Therapist                   Mobility       Row Name 04/26/25 1328          Bed Mobility    Bed Mobility sit-supine  -CN     Sit-Supine Freedom (Bed Mobility) minimum assist (75% patient effort)  -CN     Assistive Device (Bed Mobility) head of bed elevated  -CN       Row Name 04/26/25 1328          Sit-Stand Transfer    Sit-Stand Freedom (Transfers) contact guard  -CN     Assistive Device (Sit-Stand Transfers) --  no AD  -CN       Row Name 04/26/25 1328          Gait/Stairs (Locomotion)    Freedom Level (Gait) contact guard  -CN     Assistive Device (Gait) --  no AD  -CN     Deviations/Abnormal Patterns (Gait) sherice decreased;stride length decreased  -CN     Bilateral Gait Deviations forward flexed posture;heel strike decreased  -CN               User Key  (r) = Recorded By, (t) = Taken By, (c) = Cosigned By      Initials Name Provider Type    Elisabet Bravo, PT Physical Therapist                   Obj/Interventions       Row Name 04/26/25 1329          Range of Motion Comprehensive    General Range of Motion no range of motion deficits identified  -CN       Row Name 04/26/25 1329          Strength Comprehensive (MMT)    General Manual Muscle Testing (MMT) Assessment lower extremity strength deficits identified  -CN     Comment, General Manual Muscle Testing (MMT) Assessment Gross LE strength rated 4/5  -CN               User Key  (r) = Recorded By, (t) = Taken By, (c) = Cosigned By      Initials Name Provider Type    Elisabet Bravo, PT Physical Therapist                   Goals/Plan       Row Name 04/26/25 1333          Bed Mobility Goal 1 (PT)    Activity/Assistive Device (Bed Mobility Goal 1, PT) bed mobility activities, all  -CN     Freedom Level/Cues Needed (Bed Mobility Goal 1, PT) modified independence  -CN     Time Frame (Bed Mobility Goal 1, PT) 10 days  -CN       Row Name 04/26/25 1333          Transfer Goal 1 (PT)    Activity/Assistive Device (Transfer Goal 1,  PT) transfers, all  -CN     [place] Level/Cues Needed (Transfer Goal 1, PT) modified independence  -CN     Time Frame (Transfer Goal 1, PT) 10 days  -CN       Row Name 04/26/25 8373          Gait Training Goal 1 (PT)    Activity/Assistive Device (Gait Training Goal 1, PT) gait (walking locomotion);assistive device use  -CN     [place] Level (Gait Training Goal 1, PT) modified independence  -CN     Distance (Gait Training Goal 1, PT) 250  -CN     Time Frame (Gait Training Goal 1, PT) 10 days  -CN       Row Name 04/26/25 1333          Stairs Goal 1 (PT)    Activity/Assistive Device (Stairs Goal 1, PT) ascending stairs;descending stairs  -CN     [place] Level/Cues Needed (Stairs Goal 1, PT) modified independence  -CN     Number of Stairs (Stairs Goal 1, PT) 2  -CN     Time Frame (Stairs Goal 1, PT) 10 days  -CN       Row Name 04/26/25 8912          Therapy Assessment/Plan (PT)    Planned Therapy Interventions (PT) balance training;bed mobility training;gait training;home exercise program;neuromuscular re-education;patient/family education;stair training;strengthening;transfer training  -CN               User Key  (r) = Recorded By, (t) = Taken By, (c) = Cosigned By      Initials Name Provider Type    CN Elisabet Tan, PT Physical Therapist                   Clinical Impression       Row Name 04/26/25 3332          Pain    Pretreatment Pain Rating 2/10  -CN     Posttreatment Pain Rating 2/10  -CN     Pain Location other (see comments)  Pt and wife report most pain in scrotum with scooting in bed  -CN     Pain Management Interventions exercise or physical activity utilized;positioning techniques utilized  -CN       Row Name 04/26/25 8338          Plan of Care Review    Plan of Care Reviewed With patient  -CN     Outcome Evaluation Pt s/p nephroureterectomy and reporting pain rated 2/10. Pt lives with wife with 2 ROWENA and no HR. Previously I with ADLs, however HH assist with bathing. Pt able to  perform bed mobility with Radha, limited by pain. STS and gait performed with CGA and no AD. Pt would benefit from skilled PT services and plans to D/C home with HHPT.  -CN       Row Name 04/26/25 1329          Therapy Assessment/Plan (PT)    Rehab Potential (PT) good  -CN     Criteria for Skilled Interventions Met (PT) yes;meets criteria;skilled treatment is necessary  -CN     Therapy Frequency (PT) 5 times/wk  -CN       Row Name 04/26/25 1329          Vital Signs    O2 Delivery Pre Treatment supplemental O2  -CN     O2 Delivery Intra Treatment supplemental O2  -CN     O2 Delivery Post Treatment supplemental O2  -CN       Row Name 04/26/25 1329          Positioning and Restraints    Pre-Treatment Position standing in room  with spouse  -CN     Post Treatment Position bed  -CN     In Bed fowlers;call light within reach;encouraged to call for assist;exit alarm on;with family/caregiver  -CN               User Key  (r) = Recorded By, (t) = Taken By, (c) = Cosigned By      Initials Name Provider Type    Elisabet Bravo, PT Physical Therapist                   Outcome Measures       Row Name 04/26/25 1334 04/26/25 0843       How much help from another person do you currently need...    Turning from your back to your side while in flat bed without using bedrails? 3  -CN 3  -QW    Moving from lying on back to sitting on the side of a flat bed without bedrails? 3  -CN 3  -QW    Moving to and from a bed to a chair (including a wheelchair)? 3  -CN 3  -QW    Standing up from a chair using your arms (e.g., wheelchair, bedside chair)? 3  -CN 3  -QW    Climbing 3-5 steps with a railing? 3  -CN 3  -QW    To walk in hospital room? 3  -CN 3  -QW    AM-PAC 6 Clicks Score (PT) 18  -CN 18  -QW              User Key  (r) = Recorded By, (t) = Taken By, (c) = Cosigned By      Initials Name Provider Type    Elisabet Bravo, PT Physical Therapist    Gerard Welsh, RN Registered Nurse                                  Physical Therapy Education       Title: PT OT SLP Therapies (Done)       Topic: Physical Therapy (Done)       Point: Mobility training (Done)       Learning Progress Summary            Patient Acceptance, E, VU,NR by CN at 4/26/2025 1334    Acceptance, E, Bed IU by QW at 4/26/2025 1002    Acceptance, E, Bed IU by QW at 4/25/2025 0849   Significant Other Acceptance, E, Bed IU by QW at 4/26/2025 1002    Acceptance, E, Bed IU by QW at 4/25/2025 0849   Caregiver Acceptance, E, VU,NR by CN at 4/26/2025 1334                      Point: Home exercise program (Done)       Learning Progress Summary            Patient Acceptance, E, VU,NR by CN at 4/26/2025 1334    Acceptance, E, Bed IU by QW at 4/26/2025 1002    Acceptance, E, Bed IU by QW at 4/25/2025 0849   Significant Other Acceptance, E, Bed IU by QW at 4/26/2025 1002    Acceptance, E, Bed IU by QW at 4/25/2025 0849   Caregiver Acceptance, E, VU,NR by CN at 4/26/2025 1334                      Point: Body mechanics (Done)       Learning Progress Summary            Patient Acceptance, E, VU,NR by CN at 4/26/2025 1334    Acceptance, E, Bed IU by QW at 4/26/2025 1002    Acceptance, E, Bed IU by QW at 4/25/2025 0849   Significant Other Acceptance, E, Bed IU by QW at 4/26/2025 1002    Acceptance, E, Bed IU by QW at 4/25/2025 0849   Caregiver Acceptance, E, VU,NR by CN at 4/26/2025 1334                      Point: Precautions (Done)       Learning Progress Summary            Patient Acceptance, E, VU,NR by CN at 4/26/2025 1334    Acceptance, E, Bed IU by QW at 4/26/2025 1002    Acceptance, E, Bed IU by QW at 4/25/2025 0849   Significant Other Acceptance, E, Bed IU by QW at 4/26/2025 1002    Acceptance, E, Bed IU by QW at 4/25/2025 0849   Caregiver Acceptance, E, VU,NR by CN at 4/26/2025 1334                                      User Key       Initials Effective Dates Name Provider Type Discipline    CN 06/16/21 -  Elisabet Tan, PT Physical Therapist PT    QW  01/23/24 -  Gerard Mohr, RN Registered Nurse Nurse                  PT Recommendation and Plan  Planned Therapy Interventions (PT): balance training, bed mobility training, gait training, home exercise program, neuromuscular re-education, patient/family education, stair training, strengthening, transfer training  Outcome Evaluation: Pt s/p nephroureterectomy and reporting pain rated 2/10. Pt lives with wife with 2 ROWENA and no HR. Previously I with ADLs, however HH assist with bathing. Pt able to perform bed mobility with Radha, limited by pain. STS and gait performed with CGA and no AD. Pt would benefit from skilled PT services and plans to D/C home with HHPT.     Time Calculation:         PT Charges       Row Name 04/26/25 1335             Time Calculation    Start Time 0959  -CN      Stop Time 1014  -CN      Time Calculation (min) 15 min  -CN      PT Received On 04/26/25  -CN      PT - Next Appointment 04/28/25  -CN         Timed Charges    02544 - Gait Training Minutes  8  -CN         Total Minutes    Timed Charges Total Minutes 8  -CN       Total Minutes 8  -CN                User Key  (r) = Recorded By, (t) = Taken By, (c) = Cosigned By      Initials Name Provider Type    CN Elisabet Tan, PT Physical Therapist                  Therapy Charges for Today       Code Description Service Date Service Provider Modifiers Qty    87140562817 HC GAIT TRAINING EA 15 MIN 4/26/2025 Elisabet Tan, PT GP 1    [id number]  PT EVAL LOW COMPLEXITY 1 4/26/2025 Elisabet Tan, PT GP 1            PT G-Codes  AM-PAC 6 Clicks Score (PT): 18  PT Discharge Summary  Anticipated Discharge Disposition (PT): home with assist, home with home health    Elisabet Tan PT  4/26/2025

## 2025-04-26 NOTE — PROGRESS NOTES
"  [facility] Progress Note    Chief Complaint:  Pt with hx RIGHT upper tract urothelial carcinoma s/p R robot assisted right nephroureterectomy POD 2.         Vital Signs  /67 (BP Location: Left arm, Patient Position: Lying)   Pulse 90   Temp 97.5 °F (36.4 °C) (Oral)   Resp 18   Ht 165.1 cm (65\")   Wt 66.2 kg (145 lb 15.1 oz)   SpO2 99%   BMI 24.29 kg/m²     Physical Exam:     General Appearance:    Alert, cooperative, NAD, pain tolerable with meds   HEENT:    No trauma, pupils reactive, hearing intact   Back:     No CVA tenderness   Lungs:     Respirations unlabored, no wheezing    Heart:    RRR, intact peripheral pulses   Abdomen:     Soft, NDNT, no masses, no guarding   :    Absent bowel sounds no flatus, clear urine, surgical sites well approximated, no s/s of bleeding  or infection   Extremities:   No edema, no deformity   Lymphatic:   No neck or groin LAD   Skin:   No bleeding, bruising or rashes   Neuro/Psych:   Orientation intact, mood/affect pleasant, no focal findings        Results Review:     I reviewed the patient's new clinical results.  Lab Results (last 24 hours)       Procedure Component Value Units Date/Time    Hemoglobin A1c [[id number]]  (Normal) Collected: 04/26/25 0704    Specimen: Blood Updated: 04/26/25 1010     Hemoglobin A1C 5.60 %     Narrative:      Hemoglobin A1C Ranges:    Increased Risk for Diabetes  5.7% to 6.4%  Diabetes                     >= 6.5%  Diabetic Goal                < 7.0%    Basic Metabolic Panel [[id number]]  (Abnormal) Collected: 04/26/25 0704    Specimen: Blood Updated: 04/26/25 0747     Glucose 147 mg/dL      BUN 21 mg/dL      Creatinine 1.22 mg/dL      Sodium 141 mmol/L      Potassium 4.1 mmol/L      Chloride 104 mmol/L      CO2 30.6 mmol/L      Calcium 9.4 mg/dL      BUN/Creatinine Ratio 17.2     Anion Gap 6.4 mmol/L      eGFR 59.2 mL/min/1.73     Narrative:      GFR Categories in Chronic Kidney Disease (CKD)      GFR Category          GFR " (mL/min/1.73)    Interpretation  G1                     90 or greater         Normal or high (1)  G2                      60-89                Mild decrease (1)  G3a                   45-59                Mild to moderate decrease  G3b                   30-44                Moderate to severe decrease  G4                    15-29                Severe decrease  G5                    14 or less           Kidney failure          (1)In the absence of evidence of kidney disease, neither GFR category G1 or G2 fulfill the criteria for CKD.    eGFR calculation 2021 CKD-EPI creatinine equation, which does not include race as a factor    CBC (No Diff) [[id number]]  (Abnormal) Collected: 04/26/25 0704    Specimen: Blood Updated: 04/26/25 0738     WBC 6.40 10*3/mm3      RBC 3.41 10*6/mm3      Hemoglobin 9.2 g/dL      Hematocrit 28.8 %      MCV 84.5 fL      MCH 27.0 pg      MCHC 31.9 g/dL      RDW 14.5 %      RDW-SD 44.4 fl      MPV 9.0 fL      Platelets 175 10*3/mm3     Hemoglobin & Hematocrit, Blood [[id number]]  (Abnormal) Collected: 04/26/25 0023    Specimen: Blood Updated: 04/26/25 0114     Hemoglobin 9.2 g/dL      Hematocrit 29.3 %     Hemoglobin & Hematocrit, Blood [[id number]]  (Abnormal) Collected: 04/25/25 1607    Specimen: Blood Updated: 04/25/25 1723     Hemoglobin 9.3 g/dL      Hematocrit 29.7 %     CBC (No Diff) [[id number]]  (Abnormal) Collected: 04/25/25 1517    Specimen: Blood Updated: 04/25/25 1601     WBC 7.95 10*3/mm3      RBC 3.60 10*6/mm3      Hemoglobin 9.6 g/dL      Hematocrit 32.0 %      MCV 88.9 fL      MCH 26.7 pg      MCHC 30.0 g/dL      RDW 14.6 %      RDW-SD 47.2 fl      MPV 9.4 fL      Platelets 207 10*3/mm3           Imaging Results (Last 24 Hours)       Procedure Component Value Units Date/Time    XR Chest 1 View [[id number]] Collected: 04/25/25 1953     Updated: 04/25/25 2011    Narrative:         XR CHEST 1 VW-     HISTORY: Chronic respiratory failure     COMPARISON: Portable upright chest  03/14/2025     FINDINGS: There is free intraperitoneal gas beneath both hemidiaphragms.  Yesterday, patient underwent laparoscopic nephroureterectomy. There is  basal atelectasis with suspected small pleural effusions. Heart size is  enlarged. Left subclavian cardiac pacer is present. Calcified nodules  right lung apex. There are also calcified nodules within the right  midlung. There is no evidence for perihilar edema or pneumothorax.  Reverse right shoulder arthroplasty is evident. Midline surgical skin  staples overlie the abdomen centrally. Subcutaneous emphysema along the  right lateral upper abdominal wall.       Impression:      1. Free intraperitoneal gas beneath hemidiaphragms attributed to recent  surgery.  2. Small pleural effusions with basal atelectasis.  3. Cardiomegaly.  4. Subcutaneous emphysema along the right lateral upper abdominal wall.        This report was finalized on 4/25/2025 8:08 PM by Rishi Conway M.D  on Workstation: [id number]               Medication Review:   I have personally reviewed    Current Facility-Administered Medications:     albuterol sulfate HFA (PROVENTIL HFA;VENTOLIN HFA;PROAIR HFA) inhaler 2 puff, 2 puff, Inhalation, Q6H PRN, Shayne Clemente Jr., MD    arformoterol (BROVANA) nebulizer solution 15 mcg, 15 mcg, Nebulization, BID - RT, Shayne Clemente Jr., MD, 15 mcg at 04/26/25 0645    bisacodyl (DULCOLAX) suppository 10 mg, 10 mg, Rectal, Daily, Shayne Clemente Jr., MD    budesonide (PULMICORT) nebulizer solution 1 mg, 1 mg, Nebulization, BID - RT, Shayne Clemente Jr., MD, 1 mg at 04/26/25 0646    famotidine (PEPCID) tablet 20 mg, 20 mg, Oral, BID AC, Shayne Clemente Jr., MD, 20 mg at 04/26/25 0624    furosemide (LASIX) tablet 40 mg, 40 mg, Oral, Daily, Shayne Clemente Jr., MD, 40 mg at 04/26/25 0838    HYDROcodone-acetaminophen (NORCO) 5-325 MG per tablet 1 tablet, 1 tablet, Oral, Q6H PRN, Shayne Clemente Jr., MD, 1 tablet at  04/26/25 0843    HYDROmorphone (DILAUDID) injection 0.5 mg, 0.5 mg, Intravenous, Q2H PRN, 0.5 mg at 04/24/25 1934 **AND** naloxone (NARCAN) injection 0.1 mg, 0.1 mg, Intravenous, Q5 Min PRN, Shayne Clemente Jr., MD    ipratropium (ATROVENT) nebulizer solution 0.5 mg, 500 mcg, Nebulization, 4x Daily - RT, Shayne Clemente Jr., MD, 0.5 mg at 04/26/25 0644    levothyroxine (SYNTHROID, LEVOTHROID) tablet 125 mcg, 125 mcg, Oral, Q AM, Shayne Clemente Jr., MD, 125 mcg at 04/26/25 0623    metoprolol tartrate (LOPRESSOR) tablet 25 mg, 25 mg, Oral, BID, Shayne Clemente Jr., MD, 25 mg at 04/26/25 0838    ondansetron ODT (ZOFRAN-ODT) disintegrating tablet 4 mg, 4 mg, Oral, Q6H PRN **OR** ondansetron (ZOFRAN) injection 4 mg, 4 mg, Intravenous, Q6H PRN, Shayne Clemente Jr., MD, 4 mg at 04/25/25 2002    oxyCODONE-acetaminophen (PERCOCET) 5-325 MG per tablet 1 tablet, 1 tablet, Oral, Q4H PRN, Shayne Clemente Jr., MD, 1 tablet at 04/25/25 1759    rosuvastatin (CRESTOR) tablet 40 mg, 40 mg, Oral, Nightly, Shayne Clemente Jr., MD, 40 mg at 04/25/25 2002    sennosides-docusate (PERICOLACE) 8.6-50 MG per tablet 2 tablet, 2 tablet, Oral, BID, Shayne Clemente Jr., MD, 2 tablet at 04/26/25 0838    sodium chloride 0.9 % bolus 500 mL, 500 mL, Intravenous, Once, Shayne Clemente Jr., MD, Stopped at 04/25/25 0745    Allergies:    Patient has no known allergies.    Assessment:    Active Problems:    RIGHT upper tract urothelial carcinoma   - stable post R robotic nephroureterectomy   - Cr 1.32 -> 1.22  - WBC 7.95 -> 6.4  - Tmax 98.1 F   - some confusion with percocet    Plan:  Advance diet as tolerated  OOB PRN  Cont norco PRN pain      Abhishek Camacho, APRN    4/26/2025  10:26 EDT

## 2025-04-26 NOTE — PLAN OF CARE
Goal Outcome Evaluation:               Pt A/Ox4, confusion resolved when using Norco rather than percocet. 2.5- 3L oxygen via nasal cannula. Up with assist x1. Heel padding applied to right heel due to redness, ambulation and incentive spirometer use encouraged. Bacitracin ointment initiated for scrotal redness, Dulcolax suppository given per orders. Tolerated full liq diet. IVF discontinued. Diet advanced to regular. CXR complete, see results. Plan of care ongoing, VSS.

## 2025-04-27 ENCOUNTER — READMISSION MANAGEMENT (OUTPATIENT)
Dept: CALL CENTER | Facility: HOSPITAL | Age: 85
End: 2025-04-27
Payer: MEDICARE

## 2025-04-27 VITALS
RESPIRATION RATE: 18 BRPM | SYSTOLIC BLOOD PRESSURE: 99 MMHG | HEART RATE: 77 BPM | HEIGHT: 65 IN | WEIGHT: 145.94 LBS | TEMPERATURE: 98.1 F | BODY MASS INDEX: 24.32 KG/M2 | OXYGEN SATURATION: 98 % | DIASTOLIC BLOOD PRESSURE: 67 MMHG

## 2025-04-27 LAB
ANION GAP SERPL CALCULATED.3IONS-SCNC: 5 MMOL/L (ref 5–15)
BUN SERPL-MCNC: 18 MG/DL (ref 8–23)
BUN/CREAT SERPL: 15.3 (ref 7–25)
CALCIUM SPEC-SCNC: 9.4 MG/DL (ref 8.6–10.5)
CHLORIDE SERPL-SCNC: 102 MMOL/L (ref 98–107)
CO2 SERPL-SCNC: 32 MMOL/L (ref 22–29)
CREAT SERPL-MCNC: 1.18 MG/DL (ref 0.76–1.27)
DEPRECATED RDW RBC AUTO: 45.4 FL (ref 37–54)
EGFRCR SERPLBLD CKD-EPI 2021: 61.6 ML/MIN/1.73
ERYTHROCYTE [DISTWIDTH] IN BLOOD BY AUTOMATED COUNT: 14.6 % (ref 12.3–15.4)
GLUCOSE SERPL-MCNC: 142 MG/DL (ref 65–99)
HCT VFR BLD AUTO: 29.1 % (ref 37.5–51)
HGB BLD-MCNC: 9 G/DL (ref 13–17.7)
MCH RBC QN AUTO: 26.6 PG (ref 26.6–33)
MCHC RBC AUTO-ENTMCNC: 30.9 G/DL (ref 31.5–35.7)
MCV RBC AUTO: 86.1 FL (ref 79–97)
PLATELET # BLD AUTO: 181 10*3/MM3 (ref 140–450)
PMV BLD AUTO: 9.2 FL (ref 6–12)
POTASSIUM SERPL-SCNC: 3.9 MMOL/L (ref 3.5–5.2)
RBC # BLD AUTO: 3.38 10*6/MM3 (ref 4.14–5.8)
SODIUM SERPL-SCNC: 139 MMOL/L (ref 136–145)
WBC NRBC COR # BLD AUTO: 5.82 10*3/MM3 (ref 3.4–10.8)

## 2025-04-27 PROCEDURE — 94799 UNLISTED PULMONARY SVC/PX: CPT

## 2025-04-27 PROCEDURE — 94761 N-INVAS EAR/PLS OXIMETRY MLT: CPT

## 2025-04-27 PROCEDURE — 94760 N-INVAS EAR/PLS OXIMETRY 1: CPT

## 2025-04-27 PROCEDURE — 85027 COMPLETE CBC AUTOMATED: CPT | Performed by: UROLOGY

## 2025-04-27 PROCEDURE — 94664 DEMO&/EVAL PT USE INHALER: CPT

## 2025-04-27 PROCEDURE — 80048 BASIC METABOLIC PNL TOTAL CA: CPT | Performed by: UROLOGY

## 2025-04-27 RX ORDER — FAMOTIDINE 20 MG/1
20 TABLET, FILM COATED ORAL DAILY
Status: DISCONTINUED | OUTPATIENT
Start: 2025-04-28 | End: 2025-04-27 | Stop reason: HOSPADM

## 2025-04-27 RX ORDER — HYDROCODONE BITARTRATE AND ACETAMINOPHEN 5; 325 MG/1; MG/1
1 TABLET ORAL EVERY 6 HOURS PRN
Refills: 0 | Status: DISCONTINUED | OUTPATIENT
Start: 2025-04-27 | End: 2025-04-27 | Stop reason: HOSPADM

## 2025-04-27 RX ORDER — HYDROCODONE BITARTRATE AND ACETAMINOPHEN 5; 325 MG/1; MG/1
1 TABLET ORAL EVERY 6 HOURS PRN
Qty: 12 TABLET | Refills: 0 | Status: SHIPPED | OUTPATIENT
Start: 2025-04-27

## 2025-04-27 RX ORDER — METOPROLOL TARTRATE 25 MG/1
12.5 TABLET, FILM COATED ORAL EVERY 12 HOURS SCHEDULED
Status: DISCONTINUED | OUTPATIENT
Start: 2025-04-27 | End: 2025-04-27

## 2025-04-27 RX ORDER — METOPROLOL TARTRATE 25 MG/1
12.5 TABLET, FILM COATED ORAL ONCE
Status: COMPLETED | OUTPATIENT
Start: 2025-04-27 | End: 2025-04-27

## 2025-04-27 RX ADMIN — METOPROLOL TARTRATE 12.5 MG: 25 TABLET, FILM COATED ORAL at 12:03

## 2025-04-27 RX ADMIN — HYDROCODONE BITARTRATE AND ACETAMINOPHEN 1 TABLET: 5; 325 TABLET ORAL at 11:56

## 2025-04-27 RX ADMIN — BACITRACIN 0.9 G: 500 OINTMENT TOPICAL at 06:53

## 2025-04-27 RX ADMIN — SENNOSIDES AND DOCUSATE SODIUM 2 TABLET: 50; 8.6 TABLET ORAL at 09:46

## 2025-04-27 RX ADMIN — LEVOTHYROXINE SODIUM 125 MCG: 125 TABLET ORAL at 06:53

## 2025-04-27 RX ADMIN — BUDESONIDE 1 MG: 1 INHALANT ORAL at 07:00

## 2025-04-27 RX ADMIN — IPRATROPIUM BROMIDE 0.5 MG: 0.5 SOLUTION RESPIRATORY (INHALATION) at 06:55

## 2025-04-27 RX ADMIN — BACITRACIN 0.9 G: 500 OINTMENT TOPICAL at 13:58

## 2025-04-27 RX ADMIN — ARFORMOTEROL TARTRATE 15 MCG: 15 SOLUTION RESPIRATORY (INHALATION) at 06:59

## 2025-04-27 RX ADMIN — IPRATROPIUM BROMIDE 0.5 MG: 0.5 SOLUTION RESPIRATORY (INHALATION) at 11:22

## 2025-04-27 RX ADMIN — FAMOTIDINE 20 MG: 20 TABLET, FILM COATED ORAL at 06:53

## 2025-04-27 RX ADMIN — METOPROLOL TARTRATE 12.5 MG: 25 TABLET, FILM COATED ORAL at 09:46

## 2025-04-27 NOTE — OUTREACH NOTE
Prep Survey      Flowsheet Row Responses   [facility] patient discharged from? [facility]   Is LACE score < 7 ? No   Eligibility [facility]   Date of Admission 04/24/25   Date of Discharge 04/27/25   Discharge Disposition Home-Health Care Sv   Discharge diagnosis Right renal mass   Does the patient have one of the following disease processes/diagnoses(primary or secondary)? Other   Does the patient have Home health ordered? Yes   What is the Home health agency?  [facility]   Is there a DME ordered? No   Prep survey completed? Yes            Yana PADGETT - Registered Nurse

## 2025-04-27 NOTE — DISCHARGE SUMMARY
"  First Urology Discharge Summary.    Hospital Co[facility]  Pt presented to [facility] [place] 4/14/24. 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     ROS:   Gen: Afeb, VSS, NAD  HEENT: NCAT, normal conjunctivae  Psych: normal mood, normal affect  Resp: nonlabored breathing, normal to percussion  CV: RRR, no c/c/e  Abd: soft/nt/nd  Ext: moves all extremities well, no calf tenderness  Skin: no rashes or lesions on exposed skin.   Neuro: normal sensation to light touch, normal speech.   : clear urine, no abd or pelvic pain    Vital Signs  BP 98/58   Pulse 96   Temp 97.5 °F (36.4 °C)   Resp 16   Ht 165.1 cm (65\")   Wt 66.2 kg (145 lb 15.1 oz)   SpO2 98%   BMI 24.29 kg/m²     Physical Exam:     General Appearance:    Alert, cooperative, NAD, improved mentation today   HEENT:    No trauma, pupils reactive, hearing intact   Back:     No CVA tenderness   Lungs:     Respirations unlabored, no wheezing    Heart:    RRR, intact peripheral pulses   Abdomen:     Soft, NDNT, no masses, no guarding   :    BM, active bowel sounds, tolerating diet, surgical wounds well approximated, no s/s of infection, rocha patent, clear urine   Extremities:   No edema, no deformity   Lymphatic:   No neck or groin LAD   Skin:   No bleeding, bruising or rashes   Neuro/Psych:   Orientation intact, mood/affect pleasant, no focal findings        Results Review:     I reviewed the patient's new clinical results.  Lab Results (last 24 hours)       Procedure Component Value Units Date/Time    Basic Metabolic Panel [[id number]]  (Abnormal) Collected: 04/27/25 0532    Specimen: Blood Updated: 04/27/25 0644     Glucose 142 mg/dL      BUN 18 mg/dL      Creatinine 1.18 mg/dL      Sodium 139 mmol/L      Potassium 3.9 mmol/L      Chloride 102 mmol/L      CO2 32.0 mmol/L      Calcium 9.4 mg/dL      BUN/Creatinine Ratio 15.3     Anion Gap 5.0 mmol/L      eGFR 61.6 mL/min/1.73     Narrative:      GFR Categories in " Chronic Kidney Disease (CKD)      GFR Category          GFR (mL/min/1.73)    Interpretation  G1                     90 or greater         Normal or high (1)  G2                      60-89                Mild decrease (1)  G3a                   45-59                Mild to moderate decrease  G3b                   30-44                Moderate to severe decrease  G4                    15-29                Severe decrease  G5                    14 or less           Kidney failure          (1)In the absence of evidence of kidney disease, neither GFR category G1 or G2 fulfill the criteria for CKD.    eGFR calculation 2021 CKD-EPI creatinine equation, which does not include race as a factor    CBC (No Diff) [[id number]]  (Abnormal) Collected: 04/27/25 0532    Specimen: Blood Updated: 04/27/25 0624     WBC 5.82 10*3/mm3      RBC 3.38 10*6/mm3      Hemoglobin 9.0 g/dL      Hematocrit 29.1 %      MCV 86.1 fL      MCH 26.6 pg      MCHC 30.9 g/dL      RDW 14.6 %      RDW-SD 45.4 fl      MPV 9.2 fL      Platelets 181 10*3/mm3           Imaging Results (Last 24 Hours)       ** No results found for the last 24 hours. **            Medication Review:   I have personally reviewed    Current Facility-Administered Medications:     albuterol sulfate HFA (PROVENTIL HFA;VENTOLIN HFA;PROAIR HFA) inhaler 2 puff, 2 puff, Inhalation, Q6H PRN, Shayne Clemente Jr., MD    arformoterol (BROVANA) nebulizer solution 15 mcg, 15 mcg, Nebulization, BID - RT, Shayne Clemente Jr., MD, 15 mcg at 04/27/25 0659    bacitracin 500 UNIT/GM ointment 0.9 g, 1 Application, Topical, Q8H, Shayne Clemente Jr., MD, 0.9 g at 04/27/25 0653    bisacodyl (DULCOLAX) suppository 10 mg, 10 mg, Rectal, Daily, Shayne Clemente Jr., MD    budesonide (PULMICORT) nebulizer solution 1 mg, 1 mg, Nebulization, BID - RT, Shayne Clemente Jr., MD, 1 mg at 04/27/25 0700    famotidine (PEPCID) tablet 20 mg, 20 mg, Oral, BID AC, Shayne Clemente Jr., MD, 20  mg at 04/27/25 0653    furosemide (LASIX) tablet 40 mg, 40 mg, Oral, Daily, Shayne Clemente Jr., MD, 40 mg at 04/26/25 0838    HYDROcodone-acetaminophen (NORCO) 5-325 MG per tablet 1 tablet, 1 tablet, Oral, Q6H PRN, Shayne Clemente Jr., MD, 1 tablet at 04/26/25 0843    HYDROmorphone (DILAUDID) injection 0.5 mg, 0.5 mg, Intravenous, Q2H PRN, 0.5 mg at 04/24/25 1934 **AND** naloxone (NARCAN) injection 0.1 mg, 0.1 mg, Intravenous, Q5 Min PRN, Shayne Clemente Jr., MD    ipratropium (ATROVENT) nebulizer solution 0.5 mg, 500 mcg, Nebulization, 4x Daily - RT, Shayne Clemente Jr., MD, 0.5 mg at 04/27/25 0655    levothyroxine (SYNTHROID, LEVOTHROID) tablet 125 mcg, 125 mcg, Oral, Q AM, Shayne Clemente Jr., MD, 125 mcg at 04/27/25 0653    metoprolol tartrate (LOPRESSOR) tablet 12.5 mg, 12.5 mg, Oral, Q12H, Boris Espinoza MD, 12.5 mg at 04/27/25 0946    [Held by provider] metoprolol tartrate (LOPRESSOR) tablet 25 mg, 25 mg, Oral, BID, Shayne Clemente Jr., MD, 25 mg at 04/26/25 2009    ondansetron ODT (ZOFRAN-ODT) disintegrating tablet 4 mg, 4 mg, Oral, Q6H PRN **OR** ondansetron (ZOFRAN) injection 4 mg, 4 mg, Intravenous, Q6H PRN, Shayne Clemente Jr., MD, 4 mg at 04/25/25 2002    oxyCODONE-acetaminophen (PERCOCET) 5-325 MG per tablet 1 tablet, 1 tablet, Oral, Q4H PRN, Shayne Clemente Jr., MD, 1 tablet at 04/25/25 1759    rosuvastatin (CRESTOR) tablet 40 mg, 40 mg, Oral, Nightly, Shayne Clemente Jr., MD, 40 mg at 04/26/25 2009    sennosides-docusate (PERICOLACE) 8.6-50 MG per tablet 2 tablet, 2 tablet, Oral, BID, Shayne Clemente Jr., MD, 2 tablet at 04/27/25 0946    sodium chloride 0.9 % bolus 500 mL, 500 mL, Intravenous, Once, Shayne Clemente Jr., MD, Stopped at 04/25/25 0745    Allergies:    Patient has no known allergies.    Assessment:    Active Problems:    RIGHT upper tract urothelial carcinoma   - stable post R robotic nephroureterectomy   - Cr 1.22 -> 1.18   4/27/25  - WBC 6.4 -> 5.82  - Tmax 98.1 F   - KANA < 25 cc , serosanguinous    Plan:  D/c KANA rocha  Discharge to home        Abhishek Camacho, CECI    4/27/2025  11:02 EDT

## 2025-04-27 NOTE — PROGRESS NOTES
Name: Vijay Foote ADMIT: 2025   : 1940  PCP: German Mccray MD    MRN: [id number] LOS: 3 days   AGE/SEX: 84 y.o. male  ROOM: [facility]     Subjective   Subjective   Confusion improved today. Wife states he has some confusion with medication. She also reports has low normal BPs and needs to take Lopressor even with low normal BPs to prevent rapid rate A-fib.     Objective   Objective   Vital Signs  Temp:  [97.5 °F (36.4 °C)-97.9 °F (36.6 °C)] 97.5 °F (36.4 °C)  Heart Rate:  [] 84  Resp:  [16-18] 18  BP: ()/(51-78) 98/58  SpO2:  [98 %-100 %] 98 %  on  Flow (L/min) (Oxygen Therapy):  [3] 3;   Device (Oxygen Therapy): nasal cannula  Body mass index is 24.29 kg/m².    Physical Exam  Constitutional:       General: He is not in acute distress.     Appearance: He is ill-appearing (chronic). He is not toxic-appearing.   HENT:      Head: Normocephalic and atraumatic.   Cardiovascular:      Rate and Rhythm: Normal rate and regular rhythm.   Pulmonary:      Effort: Pulmonary effort is normal. No respiratory distress.      Breath sounds: Normal breath sounds. No wheezing or rhonchi.   Genitourinary:     Comments: Clear yellow urine in bag  Musculoskeletal:         General: No swelling.   Skin:     General: Skin is warm and dry.   Neurological:      General: No focal deficit present.      Mental Status: He is alert and oriented to person, place, and time.   Psychiatric:         Mood and Affect: Mood normal.         Behavior: Behavior normal.     Results Review  I reviewed the patient's new clinical results.  Results from last 7 days   Lab Units 25  0532 25  0704 25  0023 25  1607 25  1517 25  0737   WBC 10*3/mm3 5.82 6.40  --   --  7.95 6.30   HEMOGLOBIN g/dL 9.0* 9.2* 9.2* 9.3* 9.6* 9.6*   PLATELETS 10*3/mm3 181 175  --   --  207 214     Results from last 7 days   Lab Units 25  0532 25  0704 25  0737 25  1210   SODIUM mmol/L 139 141 142  139   POTASSIUM mmol/L 3.9 4.1 4.6 4.6   CHLORIDE mmol/L 102 104 105 105   CO2 mmol/L 32.0* 30.6* 30.3* 27.0   BUN mg/dL 18 21 24* 24*   CREATININE mg/dL 1.18 1.22 1.31* 1.08   GLUCOSE mg/dL 142* 147* 138* 202*     Lab Results   Component Value Date    ANIONGAP 5.0 04/27/2025     Estimated Creatinine Clearance: 45.2 mL/min (by C-G formula based on SCr of 1.18 mg/dL).   Lab Results   Component Value Date    EGFR 61.6 04/27/2025         Results from last 7 days   Lab Units 04/27/25  0532 04/26/25  0704 04/25/25  0737 04/24/25  1210   CALCIUM mg/dL 9.4 9.4 9.3 9.9       Hemoglobin A1C   Date/Time Value Ref Range Status   04/26/2025 0704 5.60 4.80 - 5.60 % Final       XR Chest 1 View  Result Date: 4/25/2025  1. Free intraperitoneal gas beneath hemidiaphragms attributed to recent surgery. 2. Small pleural effusions with basal atelectasis. 3. Cardiomegaly. 4. Subcutaneous emphysema along the right lateral upper abdominal wall.   This report was finalized on 4/25/2025 8:08 PM by Rishi Conway M.D on Workstation: [id number]        Scheduled Meds  arformoterol, 15 mcg, Nebulization, BID - RT  bacitracin, 1 Application, Topical, Q8H  bisacodyl, 10 mg, Rectal, Daily  budesonide, 1 mg, Nebulization, BID - RT  famotidine, 20 mg, Oral, BID AC  furosemide, 40 mg, Oral, Daily  ipratropium, 500 mcg, Nebulization, 4x Daily - RT  levothyroxine, 125 mcg, Oral, Q AM  metoprolol tartrate, 12.5 mg, Oral, Q12H  [Held by provider] metoprolol tartrate, 25 mg, Oral, BID  rosuvastatin, 40 mg, Oral, Nightly  senna-docusate sodium, 2 tablet, Oral, BID  sodium chloride, 500 mL, Intravenous, Once    Continuous Infusions     PRN Meds    albuterol sulfate HFA    HYDROcodone-acetaminophen    HYDROmorphone **AND** naloxone    ondansetron ODT **OR** ondansetron    oxyCODONE-acetaminophen       Diet  Diet: Regular/House; Fluid Consistency: Thin (IDDSI 0)       Assessment/Plan     Active Hospital Problems    Diagnosis  POA    **Right renal mass  [N28.89]  Yes    Hyperglycemia [R73.9]  Unknown    Presence of Watchman left atrial appendage closure device [Z95.818]  Yes    Hypertension [I10]  Yes    Coronary artery disease [I25.10]  Yes    COPD (chronic obstructive pulmonary disease) [J44.9]  Yes    Hyperlipidemia [E78.5]  Yes      Resolved Hospital Problems   No resolved problems to display.     Patient is a 84 y.o. male     Urothelial carcinoma  Status post right nephroureterectomy 4/24/2025  Elevated creatinine improved  Low normal BPs per wife is normal  Some mild blood loss anemia could be contributing to low normal BP but stable    Toxic encephalopathy  Resolved. Wife reports he has some confusion with pain medication  Delirium precautions     COPD  Chronic hypoxic respiratory failure  AISSATOU  He is currently on Flow (L/min) (Oxygen Therapy):  [3] 3 which is his baseline oxygen requirement. He has chronic dyspnea which is the same  Chest x-ray small pleural effusions and atelectasis and postop changes     Atrial fibrillation status post watchman  ICD  Chronic systolic CHF  Echo from July 2024 EF 31% (Alonzo)  Plavix on hold until okay with urology (wife states restarting it about a week and has     Hypertension Lasix and metoprolol with holding parameters  Hyperlipidemia statin  Hypothyroidism levothyroxine  Hypoglycemia last A1c in September 6.2%, 5.60% this admission    DVT prophylaxis  SCDs    Discussed with patient, family, and nursing staff    Boris Espinoza MD  [facility]  04/27/25 11:50 EDT

## 2025-04-27 NOTE — DISCHARGE INSTRUCTIONS
Nephroureterectomy Postoperative Instructions  Diet  After anesthesia, begin with clear liquids. You may take what you like to eat or drink. Depending on how you feel the following day, you may resume your normal diet. The appetite may be diminished the first several days at home. Drink plenty of water and eat plenty of vegetables and fruit to promote healing and avoid constipation. Avoid heavy meals.    Activity  Be sure to walk at least six times per day. This helps prevents blood clots in the legs, which can travel to the lung and become life-threatening. Start walking as soon as possible after surgery, either the afternoon of surgery or the morning after surgery. You may take walks outside. You may go up and down stairs.    Do not lift more than 10 pounds for 6 weeks. This stopped to prevent hernia or bulge at the incision site.  Avoid strenuous activity for 6 weeks. This includes activities such as heavy chores, strenuous exercise or lifting weights.  You may tire easily with minimal activity. This is normal after surgery. Your energy level will gradually return in 6 to 8 weeks.  Rocha catheter  You will have a rocha catheter, a plastic tube that drains the bladder, in place after surgery. You may have this for 7 to 10 days after surgery. The nurses in the hospital will show you and your family how to care for the catheter. You will be taught how to use a smaller bag during the day which can be worn under clothes. You will be taught how to switch to a larger bag for night time. The catheter should always be secured to the leg so there is no tension or pulling on the catheter. Take care the catheter does not get dislodged when turning in bed, getting into or out of a chair or car.  You may see blood or small debris in the catheter tubing and bag. This is normal. Call the office or come to the emergency room if the catheter does not drain for 4 hours or there are large (dime sized) clots.        Bathing  You may  take a shower as you normally do starting the day after surgery. You may allow the water to run over the incision then pat dry. Avoid submerging in water for three days. You may have staples in the incision. These will be removed postoperatively. You do not need to place anything over the incision but may place a gauze if there is oozing or spotting.    Medication  Resume your regular medications or follow instructions given at the hospital.  A pain medication to be taken by mouth may be prescribed for you. Narcotic pain medications are addictive and constipating and therefore should be discontinued as soon as possible.  NSAIDs (Alleve, ibuprofen, Motrin) decrease pain and inflammation. These may be prescribed or can be purchased at a drug store without a prescription. Ask your provider if you have history of low kidney function.  Acetaminophen (Tylenol) helps decrease discomfort after surgery. This is available at any drug store without a prescription. Some narcotic pain medicines also contain acetaminophen. Do not take more than 3000 mg acetaminophen total per day. Ask your provider if you have history of liver problems.    Avoid constipation  Surgery, general anesthesia and prescription pain medicine can make you constipated. Straining to have a bowel movement can put stress on the surgical site and should be avoided. Take plenty of water, vegetables and fruit to avoid constipation. It may take a few days to have your first bowel movement after surgery.  A stool softener should be taken by mouth twice a day to avoid constipation. Stool softener or laxative may be prescribed. These are also available at any drug store without a prescription. These include docusate (Colace), Senna (Senokot or SennaGen), bisacodyl (Dulcolax) and magnesium hydroxide (Milk of Magnesia). Decrease or stop the stool softener for loose stools or diarrhea. You may use suppositories or enemas.    Post operative appointment  Patients often  have a nurse visit 1 week after surgery to remove skin staples. Strong Band-Aids called Steri-Strips are often placed at that time. Steri-Strips can be trimmed as needed. These should be removed once there been in place for 2 weeks.  Patients typically have a 2 week appointment with the surgeon to review pathology. Call the office to make an appointments if you do not already have them.    Pathology report  Pathology report is typically available on the patient portal as soon as it is read by the pathologist. The surgeon we'll review the pathology with you and discuss next steps.    When to call the office or come the emergency room  Fever over 101 degrees Fahrenheit (38.3°C), severe abdominal pain, unable to urinate, difficulty breathing, chest pain, palpitations, nausea or vomiting, leg swelling or pain.    HOW TO REACH YOUR DOCTOR    · Monday - Friday from 8:00 - 4:30, call the Urology Office, [phone].    · After hours, weekend and holidays call the [phone] or go to Emergency Room      Follow up care:    · The Urology clinic will call to schedule your post-op appointment, to occur 1-2 weeks after your operation.    · You will discuss the final pathology at your office visit.    · If you do not receive a call within 1 week for scheduling, please call [phone] to make an appointment with your Urologist.

## 2025-04-27 NOTE — PLAN OF CARE
Goal Outcome Evaluation:              Outcome Evaluation: Pt had ok night, he did have small liquid BM, didnt want any pain medication, although looked uncomfortable. Confused at times, quiet.

## 2025-04-28 NOTE — CASE MANAGEMENT/SOCIAL WORK
Case Management Discharge Note      Final Note: Home with [facility].         Selected Continued Care - Discharged on 4/27/2025 Admission date: 4/24/2025 - Discharge disposition: Home or Self Care      Destination    No services have been selected for the patient.                Durable Medical Equipment    No services have been selected for the patient.                Dialysis/Infusion    No services have been selected for the patient.                Home Medical Care    No services have been selected for the patient.                Therapy    No services have been selected for the patient.                Community Resources    No services have been selected for the patient.                Community & DME    No services have been selected for the patient.                    Selected Continued Care - Prior Encounters Includes continued care and service providers with selected services from prior encounters from 1/24/2025 to 4/27/2025      Discharged on 3/19/2025 Admission date: 3/10/2025 - Discharge disposition: Home-Health Care c      Home Medical Care       Service Provider Services Address Phone Fax Patient Preferred    [facility], Home Nursing [address] [place] [address], [place] [address] [id number] [phone] --                          Transportation Services  Private: Car    Final Discharge Disposition Code: 06 - home with home health care

## 2025-05-01 ENCOUNTER — READMISSION MANAGEMENT (OUTPATIENT)
Dept: CALL CENTER | Facility: HOSPITAL | Age: 85
End: 2025-05-01
Payer: MEDICARE

## 2025-05-01 NOTE — OUTREACH NOTE
Medical Week 1 Survey      Flowsheet Row Responses   [facility] patient discharged from? [place]   Does the patient have one of the following disease processes/diagnoses(primary or secondary)? Other   Week 1 attempt successful? Yes   Call start time 0855   Call end time 0858   Discharge diagnosis Right renal mass   Is patient permission given to speak with other caregiver? Yes   List who call center can speak with Murtaza spouse   Person spoke with today (if not patient) and relationship Murtaza spouse   Meds reviewed with patient/caregiver? Yes   Is the patient having any side effects they believe may be caused by any medication additions or changes? No   Does the patient have all medications ordered at discharge? N/A   Is the patient taking all medications as directed (includes completed medication regime)? Yes   Does the patient have a primary care provider?  Yes   Has the patient kept scheduled appointments due by today? N/A   What is the Home health agency?  [facility]    Home health comments  scheduled to visit 5/1/25   Did the patient receive a copy of their discharge instructions? Yes   Nursing interventions Reviewed instructions with patient   What is the patient's perception of their health status since discharge? Improving   Is the patient/caregiver able to teach back signs and symptoms related to disease process for when to call PCP? Yes   Is the patient/caregiver able to teach back signs and symptoms related to disease process for when to call 911? Yes   Is the patient/caregiver able to teach back the hierarchy of who to call/visit for symptoms/problems? PCP, Specialist, Home health nurse, Urgent Care, ED, 911 Yes   If the patient is a current smoker, are they able to teach back resources for cessation? Not a smoker   Week 1 call completed? Yes   Graduated Yes   Did the patient feel the follow up calls were helpful during their recovery period? Yes   Graduated/Revoked comments Improving per spouse    Call end time 0858            Maggie H - Registered Nurse

## 2025-05-02 LAB
CYTO UR: NORMAL
LAB AP CASE REPORT: NORMAL
LAB AP DIAGNOSIS COMMENT: NORMAL
LAB AP SPECIAL STAINS: NORMAL
LAB AP SYNOPTIC CHECKLIST: NORMAL
PATH REPORT.FINAL DX SPEC: NORMAL
PATH REPORT.GROSS SPEC: NORMAL

## 2025-07-09 ENCOUNTER — OFFICE (OUTPATIENT)
Dept: URBAN - METROPOLITAN AREA CLINIC 76 | Facility: CLINIC | Age: 85
End: 2025-07-09
Payer: MEDICARE

## 2025-07-09 VITALS
WEIGHT: 150 LBS | DIASTOLIC BLOOD PRESSURE: 64 MMHG | HEART RATE: 103 BPM | SYSTOLIC BLOOD PRESSURE: 122 MMHG | HEIGHT: 67 IN | OXYGEN SATURATION: 85 %

## 2025-07-09 DIAGNOSIS — K57.30 DIVERTICULOSIS OF LARGE INTESTINE WITHOUT PERFORATION OR ABS: ICD-10-CM

## 2025-07-09 DIAGNOSIS — Z86.0101 PERSONAL HISTORY OF ADENOMATOUS AND SERRATED COLON POLYPS: ICD-10-CM

## 2025-07-09 DIAGNOSIS — R10.32 LEFT LOWER QUADRANT PAIN: ICD-10-CM

## 2025-07-09 DIAGNOSIS — J44.9 CHRONIC OBSTRUCTIVE PULMONARY DISEASE, UNSPECIFIED: ICD-10-CM

## 2025-07-09 DIAGNOSIS — K59.00 CONSTIPATION, UNSPECIFIED: ICD-10-CM

## 2025-07-09 PROCEDURE — 99214 OFFICE O/P EST MOD 30 MIN: CPT | Performed by: INTERNAL MEDICINE

## 2025-07-09 NOTE — SERVICEHPINOTES
Mister Campbell is a former patient of Dr. Anna.  I am seeing him for the first time.  He does have a history of constipation.  He has a daily bowel movement with Colace and senna but says he feels like it's "rough".  He says his stool feels "abrasive".  There is no bleeding.  He is up-to-date in terms of colonoscopy.  If still indicated he'll need a colonoscopy in December of next year.  He does occasionally take pain medicine.  He takes pain medicine he'll not have a bowel movement the next day which aggravates him.
br
br He does report loss of appetite but he has a history of renal cell cancer with bone metastases status post radiation.  His weight dropped to 1. from 180-135.  His wife has been encouraged him to eat and use Ensure or boost and his weight is up to 145.  His hemoglobin is 10.4 which is stable.  He is iron deficient.  This is managed by hematology.  He looks chronically ill but is in no acute distress.

## (undated) DEVICE — GLV SURG BIOGEL LTX PF 7 1/2

## (undated) DEVICE — KT SHLDR EXACTECHGPS DISP

## (undated) DEVICE — SOL NACL 0.9PCT 1000ML

## (undated) DEVICE — EXOFIN PRECISION PEN HIGH VISCOSITY TOPICAL SKIN ADHESIVE: Brand: EXOFIN PRECISION PEN, 1G

## (undated) DEVICE — DUAL CUT SAGITTAL BLADE

## (undated) DEVICE — BLANKT WARM LOWR/BDY 100X120CM

## (undated) DEVICE — SPONGE,LAP,18"X18",DLX,XR,ST,5/PK,40/PK: Brand: MEDLINE

## (undated) DEVICE — KT PIN HEX SHLDR CORACOID EXACTECHGPS DISP

## (undated) DEVICE — ANTIBACTERIAL UNDYED BRAIDED (POLYGLACTIN 910), SYNTHETIC ABSORBABLE SUTURE: Brand: COATED VICRYL

## (undated) DEVICE — PISTON SYRINGE, IRRIGATION WITH PROTECTIVE CAP, 60 CC, INDIVIDUALLY WRAPPED: Brand: DOVER

## (undated) DEVICE — TUR/ENDOSCOPIC CABLE, 10' (3.05 M): Brand: CONMED

## (undated) DEVICE — SYR LL TP 10ML STRL

## (undated) DEVICE — SUT VIC 1 CT1 36IN J947H

## (undated) DEVICE — TIDISHIELD UROLOGY DRAIN BAGS FROSTY VINYL STERILE FITS SIEMENS UROSKOP ACCESS 20 PER CASE: Brand: TIDISHIELD

## (undated) DEVICE — SUT ETHIB 2 CV V37 MS/4 30IN MX69G

## (undated) DEVICE — SYR LUERLOK 5CC

## (undated) DEVICE — LOU GENERAL ROBOT: Brand: MEDLINE INDUSTRIES, INC.

## (undated) DEVICE — THE STERILE LIGHT HANDLE COVER IS USED WITH STERIS SURGICAL LIGHTING AND VISUALIZATION SYSTEMS.

## (undated) DEVICE — COLUMN DRAPE

## (undated) DEVICE — LOU CYSTO: Brand: MEDLINE INDUSTRIES, INC.

## (undated) DEVICE — HANDPIECE SET WITH COAXIAL HIGH FLOW TIP AND SUCTION TUBE: Brand: INTERPULSE

## (undated) DEVICE — TIP COVER ACCESSORY

## (undated) DEVICE — 3M™ STERI-DRAPE™ INSTRUMENT POUCH 1018L: Brand: STERI-DRAPE™

## (undated) DEVICE — SKIN PREP TRAY W/CHG: Brand: MEDLINE INDUSTRIES, INC.

## (undated) DEVICE — SUT VIC 0 CT1 36IN J946H

## (undated) DEVICE — GLV SURG BIOGEL LTX PF 7

## (undated) DEVICE — DRN WND JP RND W TROC SIL 15F 3/16IN

## (undated) DEVICE — BLADELESS OBTURATOR: Brand: WECK VISTA

## (undated) DEVICE — CATH FOL SIMPLASTIC 3WY 24F 30CC

## (undated) DEVICE — TOWEL,OR,DSP,ST,BLUE,STD,4/PK,20PK/CS: Brand: MEDLINE

## (undated) DEVICE — ARM DRAPE

## (undated) DEVICE — GLV SURG BIOGEL LTX PF 8

## (undated) DEVICE — ECHELON FLEX45 ENDOPATH STAPLER, ARTICULATING ENDOSCOPIC LINEAR CUTTER (NO CARTRIDGE): Brand: ECHELON ENDOPATH

## (undated) DEVICE — GLV SURG BIOGEL LTX PF 6 1/2

## (undated) DEVICE — PATIENT RETURN ELECTRODE, SINGLE-USE, CONTACT QUALITY MONITORING, ADULT, WITH 9FT CORD, FOR PATIENTS WEIGING OVER 33LBS. (15KG): Brand: MEGADYNE

## (undated) DEVICE — DRSNG GZ PETROLTM XEROFORM CURAD 1X8IN STRL

## (undated) DEVICE — DRSNG WND GZ PAD BORDERED 4X8IN STRL

## (undated) DEVICE — ENDOPATH XCEL BLADELESS TROCARS WITH STABILITY SLEEVES: Brand: ENDOPATH XCEL

## (undated) DEVICE — ZIP 16 SURGICAL SKIN CLOSURE DEVICE, PSA: Brand: ZIP 16 SURGICAL SKIN CLOSURE DEVICE

## (undated) DEVICE — TRAP FLD MINIVAC MEGADYNE 100ML

## (undated) DEVICE — TROC BLADLES ANCHORPORT/OPTI LP 12X120MM 1P/U

## (undated) DEVICE — TBG PENCL TELESCP MEGADYNE SMOKE EVAC 10FT

## (undated) DEVICE — SUT VIC 2/0 X1 27IN J459H

## (undated) DEVICE — POOLE SUCTION HANDLE: Brand: CARDINAL HEALTH

## (undated) DEVICE — BIT DRL EQUINOXE 2 AND 3.2MM

## (undated) DEVICE — GLV SURG BIOGEL LTX PF 8 1/2

## (undated) DEVICE — PK SHLDR OPN 40

## (undated) DEVICE — SHEET, DRAPE, SPLIT, STERILE: Brand: MEDLINE

## (undated) DEVICE — SOL ANTISTICK CAUTRY ELECTROLUBE LF

## (undated) DEVICE — PENCL ES MEGADINE EZ/CLEAN BUTN W/HOLSTR 10FT

## (undated) DEVICE — TRY BAG DRN URINE METER IC 350ML LXF

## (undated) DEVICE — RESERVOIR,SUCTION,100CC,SILICONE: Brand: MEDLINE

## (undated) DEVICE — GLND KWIRE
Type: IMPLANTABLE DEVICE | Site: SHOULDER | Status: NON-FUNCTIONAL
Brand: EQUINOXE
Removed: 2022-08-11

## (undated) DEVICE — STPLR SKIN VISISTAT WD 35CT

## (undated) DEVICE — SYRINGE,TOOMEY,IRRIGATION,70CC,STERILE: Brand: MEDLINE

## (undated) DEVICE — DRSNG TELFA PAD NONADH STR 1S 3X4IN

## (undated) DEVICE — LAPAROSCOPIC SMOKE FILTRATION SYSTEM: Brand: PALL LAPAROSHIELD® PLUS LAPAROSCOPIC SMOKE FILTRATION SYSTEM

## (undated) DEVICE — ENDOPATH PNEUMONEEDLE INSUFFLATION NEEDLES WITH LUER LOCK CONNECTORS 120MM: Brand: ENDOPATH

## (undated) DEVICE — MAT FLR ABSORBENT LG 4FT 10 2.5FT

## (undated) DEVICE — PREP SOL POVIDONE/IODINE BT 4OZ

## (undated) DEVICE — NITINOL WIRE WITH HYDROPHILIC TIP: Brand: SENSOR

## (undated) DEVICE — SUT SILK 2/0 FS BLK 18IN 685G

## (undated) DEVICE — ST TBG AIRSEAL FLTR TRI LUM

## (undated) DEVICE — SEAL